# Patient Record
Sex: FEMALE | Race: WHITE | Employment: OTHER | URBAN - METROPOLITAN AREA
[De-identification: names, ages, dates, MRNs, and addresses within clinical notes are randomized per-mention and may not be internally consistent; named-entity substitution may affect disease eponyms.]

---

## 2017-02-14 ENCOUNTER — HOSPITAL ENCOUNTER (OUTPATIENT)
Age: 32
Discharge: HOME | End: 2017-02-14
Payer: COMMERCIAL

## 2017-02-14 ENCOUNTER — HOSPITAL ENCOUNTER (OUTPATIENT)
Dept: GENERAL RADIOLOGY | Age: 32
Discharge: HOME | End: 2017-02-14
Payer: COMMERCIAL

## 2017-02-14 ENCOUNTER — HOSPITAL ENCOUNTER (OUTPATIENT)
Age: 32
Setting detail: SPECIMEN
Discharge: HOME | End: 2017-02-14
Payer: COMMERCIAL

## 2017-02-14 DIAGNOSIS — M54.2 CERVICAL PAIN: ICD-10-CM

## 2017-02-14 DIAGNOSIS — M54.5 LOW BACK PAIN, UNSPECIFIED BACK PAIN LATERALITY, UNSPECIFIED CHRONICITY, WITH SCIATICA PRESENCE UNSPECIFIED: ICD-10-CM

## 2017-02-14 DIAGNOSIS — M54.6 THORACIC BACK PAIN, UNSPECIFIED BACK PAIN LATERALITY, UNSPECIFIED CHRONICITY: ICD-10-CM

## 2017-02-14 LAB
ALBUMIN SERPL-MCNC: 4 G/DL (ref 3.5–5.2)
ALBUMIN/GLOBULIN RATIO: 1.2 (ref 1–2.5)
ALP BLD-CCNC: 91 U/L (ref 35–104)
ALT SERPL-CCNC: 247 U/L (ref 5–33)
ANION GAP SERPL CALCULATED.3IONS-SCNC: 12 MMOL/L (ref 9–17)
AST SERPL-CCNC: 151 U/L
BILIRUB SERPL-MCNC: 0.62 MG/DL (ref 0.3–1.2)
BILIRUBIN DIRECT: 0.18 MG/DL
BILIRUBIN, INDIRECT: 0.44 MG/DL (ref 0–1)
BUN BLDV-MCNC: 15 MG/DL (ref 6–20)
CHLORIDE BLD-SCNC: 103 MMOL/L (ref 98–107)
CHOLESTEROL/HDL RATIO: 6.2
CHOLESTEROL: 247 MG/DL
CO2: 26 MMOL/L (ref 20–31)
CREAT SERPL-MCNC: 0.63 MG/DL (ref 0.5–0.9)
GFR AFRICAN AMERICAN: >60 ML/MIN
GFR NON-AFRICAN AMERICAN: >60 ML/MIN
GFR SERPL CREATININE-BSD FRML MDRD: NORMAL ML/MIN/{1.73_M2}
GFR SERPL CREATININE-BSD FRML MDRD: NORMAL ML/MIN/{1.73_M2}
GLOBULIN: ABNORMAL G/DL (ref 1.5–3.8)
GLUCOSE BLD-MCNC: 86 MG/DL (ref 70–99)
HCT VFR BLD CALC: 43 % (ref 36–46)
HDLC SERPL-MCNC: 40 MG/DL
HEMOGLOBIN: 14.5 G/DL (ref 12–16)
LDL CHOLESTEROL: 128 MG/DL (ref 0–130)
MCH RBC QN AUTO: 31.4 PG (ref 26–34)
MCHC RBC AUTO-ENTMCNC: 33.8 G/DL (ref 31–37)
MCV RBC AUTO: 92.8 FL (ref 80–100)
PDW BLD-RTO: 14.1 % (ref 12.5–15.4)
PLATELET # BLD: 237 K/UL (ref 140–450)
PMV BLD AUTO: 8.7 FL (ref 6–12)
POTASSIUM SERPL-SCNC: 4.5 MMOL/L (ref 3.7–5.3)
RBC # BLD: 4.63 M/UL (ref 4–5.2)
SEDIMENTATION RATE, ERYTHROCYTE: 18 MM (ref 0–20)
SODIUM BLD-SCNC: 141 MMOL/L (ref 135–144)
TOTAL PROTEIN: 7.4 G/DL (ref 6.4–8.3)
TRIGL SERPL-MCNC: 397 MG/DL
TSH SERPL DL<=0.05 MIU/L-ACNC: 0.93 MIU/L (ref 0.3–5)
VITAMIN B-12: 341 PG/ML (ref 211–946)
VITAMIN D 25-HYDROXY: 13.4 NG/ML (ref 30–100)
VLDLC SERPL CALC-MCNC: ABNORMAL MG/DL (ref 1–30)
WBC # BLD: 7.9 K/UL (ref 3.5–11)

## 2017-02-14 PROCEDURE — 85027 COMPLETE CBC AUTOMATED: CPT

## 2017-02-14 PROCEDURE — 82565 ASSAY OF CREATININE: CPT

## 2017-02-14 PROCEDURE — 80051 ELECTROLYTE PANEL: CPT

## 2017-02-14 PROCEDURE — 80076 HEPATIC FUNCTION PANEL: CPT

## 2017-02-14 PROCEDURE — 82947 ASSAY GLUCOSE BLOOD QUANT: CPT

## 2017-02-14 PROCEDURE — 85651 RBC SED RATE NONAUTOMATED: CPT

## 2017-02-14 PROCEDURE — 72100 X-RAY EXAM L-S SPINE 2/3 VWS: CPT

## 2017-02-14 PROCEDURE — 72072 X-RAY EXAM THORAC SPINE 3VWS: CPT

## 2017-02-14 PROCEDURE — 82607 VITAMIN B-12: CPT

## 2017-02-14 PROCEDURE — 84443 ASSAY THYROID STIM HORMONE: CPT

## 2017-02-14 PROCEDURE — 84520 ASSAY OF UREA NITROGEN: CPT

## 2017-02-14 PROCEDURE — 36415 COLL VENOUS BLD VENIPUNCTURE: CPT

## 2017-02-14 PROCEDURE — 80061 LIPID PANEL: CPT

## 2017-02-14 PROCEDURE — 72040 X-RAY EXAM NECK SPINE 2-3 VW: CPT

## 2017-02-14 PROCEDURE — 82306 VITAMIN D 25 HYDROXY: CPT

## 2017-07-11 ENCOUNTER — APPOINTMENT (OUTPATIENT)
Dept: ULTRASOUND IMAGING | Age: 32
End: 2017-07-11
Payer: COMMERCIAL

## 2017-07-11 ENCOUNTER — HOSPITAL ENCOUNTER (EMERGENCY)
Age: 32
Discharge: HOME | End: 2017-07-11
Attending: EMERGENCY MEDICINE
Payer: COMMERCIAL

## 2017-07-11 VITALS
OXYGEN SATURATION: 98 % | HEIGHT: 64 IN | SYSTOLIC BLOOD PRESSURE: 151 MMHG | BODY MASS INDEX: 35.85 KG/M2 | RESPIRATION RATE: 16 BRPM | DIASTOLIC BLOOD PRESSURE: 92 MMHG | WEIGHT: 210 LBS | TEMPERATURE: 97.9 F | HEART RATE: 81 BPM

## 2017-07-11 DIAGNOSIS — N30.00 ACUTE CYSTITIS WITHOUT HEMATURIA: ICD-10-CM

## 2017-07-11 DIAGNOSIS — M54.41 ACUTE RIGHT-SIDED LOW BACK PAIN WITH RIGHT-SIDED SCIATICA: Primary | ICD-10-CM

## 2017-07-11 LAB
-: ABNORMAL
ABSOLUTE EOS #: 0.2 K/UL (ref 0–0.4)
ABSOLUTE LYMPH #: 2.8 K/UL (ref 1–4.8)
ABSOLUTE MONO #: 0.5 K/UL (ref 0.1–1.2)
ALBUMIN SERPL-MCNC: 4.2 G/DL (ref 3.5–5.2)
ALBUMIN/GLOBULIN RATIO: 1.2 (ref 1–2.5)
ALP BLD-CCNC: 82 U/L (ref 35–104)
ALT SERPL-CCNC: 227 U/L (ref 5–33)
AMORPHOUS: ABNORMAL
ANION GAP SERPL CALCULATED.3IONS-SCNC: 15 MMOL/L (ref 9–17)
AST SERPL-CCNC: 156 U/L
BACTERIA: ABNORMAL
BASOPHILS # BLD: 1 %
BASOPHILS ABSOLUTE: 0.1 K/UL (ref 0–0.2)
BILIRUB SERPL-MCNC: 0.47 MG/DL (ref 0.3–1.2)
BILIRUBIN URINE: NEGATIVE
BUN BLDV-MCNC: 9 MG/DL (ref 6–20)
BUN/CREAT BLD: ABNORMAL (ref 9–20)
CALCIUM SERPL-MCNC: 9.4 MG/DL (ref 8.6–10.4)
CASTS UA: ABNORMAL /LPF (ref 0–2)
CHLORIDE BLD-SCNC: 103 MMOL/L (ref 98–107)
CO2: 22 MMOL/L (ref 20–31)
COLOR: YELLOW
CREAT SERPL-MCNC: 0.59 MG/DL (ref 0.5–0.9)
CRYSTALS, UA: ABNORMAL /HPF
DIFFERENTIAL TYPE: NORMAL
DIRECT EXAM: ABNORMAL
EOSINOPHILS RELATIVE PERCENT: 2 %
EPITHELIAL CELLS UA: ABNORMAL /HPF (ref 0–5)
GFR AFRICAN AMERICAN: >60 ML/MIN
GFR NON-AFRICAN AMERICAN: >60 ML/MIN
GFR SERPL CREATININE-BSD FRML MDRD: ABNORMAL ML/MIN/{1.73_M2}
GFR SERPL CREATININE-BSD FRML MDRD: ABNORMAL ML/MIN/{1.73_M2}
GLUCOSE BLD-MCNC: 83 MG/DL (ref 70–99)
GLUCOSE URINE: NEGATIVE
HCG QUALITATIVE: NEGATIVE
HCT VFR BLD CALC: 45.5 % (ref 36–46)
HEMOGLOBIN: 15.5 G/DL (ref 12–16)
KETONES, URINE: NEGATIVE
LEUKOCYTE ESTERASE, URINE: ABNORMAL
LIPASE: 31 U/L (ref 13–60)
LYMPHOCYTES # BLD: 32 %
Lab: ABNORMAL
MCH RBC QN AUTO: 31.9 PG (ref 26–34)
MCHC RBC AUTO-ENTMCNC: 34 G/DL (ref 31–37)
MCV RBC AUTO: 93.9 FL (ref 80–100)
MONOCYTES # BLD: 6 %
MUCUS: ABNORMAL
NITRITE, URINE: NEGATIVE
OTHER OBSERVATIONS UA: ABNORMAL
PDW BLD-RTO: 14.1 % (ref 12.5–15.4)
PH UA: 5.5 (ref 5–8)
PLATELET # BLD: 239 K/UL (ref 140–450)
PLATELET ESTIMATE: NORMAL
PMV BLD AUTO: 9.5 FL (ref 6–12)
POTASSIUM SERPL-SCNC: 4.1 MMOL/L (ref 3.7–5.3)
PROTEIN UA: NEGATIVE
RBC # BLD: 4.85 M/UL (ref 4–5.2)
RBC # BLD: NORMAL 10*6/UL
RBC UA: ABNORMAL /HPF (ref 0–2)
RENAL EPITHELIAL, UA: ABNORMAL /HPF
SEG NEUTROPHILS: 59 %
SEGMENTED NEUTROPHILS ABSOLUTE COUNT: 5.2 K/UL (ref 1.8–7.7)
SODIUM BLD-SCNC: 140 MMOL/L (ref 135–144)
SPECIFIC GRAVITY UA: 1.03 (ref 1–1.03)
SPECIMEN DESCRIPTION: ABNORMAL
STATUS: ABNORMAL
TOTAL PROTEIN: 7.7 G/DL (ref 6.4–8.3)
TRICHOMONAS: ABNORMAL
TURBIDITY: ABNORMAL
URINE HGB: NEGATIVE
UROBILINOGEN, URINE: NORMAL
WBC # BLD: 8.9 K/UL (ref 3.5–11)
WBC # BLD: NORMAL 10*3/UL
WBC UA: ABNORMAL /HPF (ref 0–5)
YEAST: ABNORMAL

## 2017-07-11 PROCEDURE — 83690 ASSAY OF LIPASE: CPT

## 2017-07-11 PROCEDURE — 85025 COMPLETE CBC W/AUTO DIFF WBC: CPT

## 2017-07-11 PROCEDURE — 99284 EMERGENCY DEPT VISIT MOD MDM: CPT

## 2017-07-11 PROCEDURE — 87480 CANDIDA DNA DIR PROBE: CPT

## 2017-07-11 PROCEDURE — 84703 CHORIONIC GONADOTROPIN ASSAY: CPT

## 2017-07-11 PROCEDURE — 2500000003 HC RX 250 WO HCPCS

## 2017-07-11 PROCEDURE — 87510 GARDNER VAG DNA DIR PROBE: CPT

## 2017-07-11 PROCEDURE — 96375 TX/PRO/DX INJ NEW DRUG ADDON: CPT

## 2017-07-11 PROCEDURE — 80053 COMPREHEN METABOLIC PANEL: CPT

## 2017-07-11 PROCEDURE — 87491 CHLMYD TRACH DNA AMP PROBE: CPT

## 2017-07-11 PROCEDURE — 76830 TRANSVAGINAL US NON-OB: CPT

## 2017-07-11 PROCEDURE — 96374 THER/PROPH/DIAG INJ IV PUSH: CPT

## 2017-07-11 PROCEDURE — 2580000003 HC RX 258: Performed by: EMERGENCY MEDICINE

## 2017-07-11 PROCEDURE — 6370000000 HC RX 637 (ALT 250 FOR IP): Performed by: EMERGENCY MEDICINE

## 2017-07-11 PROCEDURE — 76856 US EXAM PELVIC COMPLETE: CPT

## 2017-07-11 PROCEDURE — 6360000002 HC RX W HCPCS: Performed by: EMERGENCY MEDICINE

## 2017-07-11 PROCEDURE — 87660 TRICHOMONAS VAGIN DIR PROBE: CPT

## 2017-07-11 PROCEDURE — 93976 VASCULAR STUDY: CPT

## 2017-07-11 PROCEDURE — 96372 THER/PROPH/DIAG INJ SC/IM: CPT

## 2017-07-11 PROCEDURE — 87591 N.GONORRHOEAE DNA AMP PROB: CPT

## 2017-07-11 PROCEDURE — 81001 URINALYSIS AUTO W/SCOPE: CPT

## 2017-07-11 RX ORDER — METRONIDAZOLE 500 MG/1
500 TABLET ORAL 2 TIMES DAILY
Qty: 14 TABLET | Refills: 0 | Status: SHIPPED | OUTPATIENT
Start: 2017-07-11 | End: 2017-07-18

## 2017-07-11 RX ORDER — CEPHALEXIN 500 MG/1
500 CAPSULE ORAL ONCE
Status: COMPLETED | OUTPATIENT
Start: 2017-07-11 | End: 2017-07-11

## 2017-07-11 RX ORDER — CEPHALEXIN 500 MG/1
500 CAPSULE ORAL 4 TIMES DAILY
Qty: 28 CAPSULE | Refills: 0 | Status: SHIPPED | OUTPATIENT
Start: 2017-07-11 | End: 2017-07-18

## 2017-07-11 RX ORDER — DIAZEPAM 2 MG/1
2 TABLET ORAL EVERY 8 HOURS PRN
Qty: 9 TABLET | Refills: 0 | Status: SHIPPED | OUTPATIENT
Start: 2017-07-11 | End: 2017-07-21

## 2017-07-11 RX ORDER — CEPHALEXIN 500 MG/1
500 CAPSULE ORAL 4 TIMES DAILY
Qty: 28 CAPSULE | Refills: 0 | Status: SHIPPED | OUTPATIENT
Start: 2017-07-11 | End: 2017-07-11

## 2017-07-11 RX ORDER — ONDANSETRON 2 MG/ML
4 INJECTION INTRAMUSCULAR; INTRAVENOUS ONCE
Status: COMPLETED | OUTPATIENT
Start: 2017-07-11 | End: 2017-07-11

## 2017-07-11 RX ORDER — AZITHROMYCIN 250 MG/1
1000 TABLET, FILM COATED ORAL ONCE
Status: COMPLETED | OUTPATIENT
Start: 2017-07-11 | End: 2017-07-11

## 2017-07-11 RX ORDER — ONDANSETRON 4 MG/1
4 TABLET, ORALLY DISINTEGRATING ORAL EVERY 8 HOURS PRN
Qty: 8 TABLET | Refills: 0 | Status: SHIPPED | OUTPATIENT
Start: 2017-07-11 | End: 2017-07-11

## 2017-07-11 RX ORDER — ONDANSETRON 4 MG/1
4 TABLET, ORALLY DISINTEGRATING ORAL EVERY 8 HOURS PRN
Qty: 8 TABLET | Refills: 0 | Status: SHIPPED | OUTPATIENT
Start: 2017-07-11 | End: 2017-08-01

## 2017-07-11 RX ORDER — LIDOCAINE HYDROCHLORIDE 20 MG/ML
INJECTION, SOLUTION INFILTRATION; PERINEURAL
Status: COMPLETED
Start: 2017-07-11 | End: 2017-07-11

## 2017-07-11 RX ORDER — FENTANYL CITRATE 50 UG/ML
50 INJECTION, SOLUTION INTRAMUSCULAR; INTRAVENOUS ONCE
Status: COMPLETED | OUTPATIENT
Start: 2017-07-11 | End: 2017-07-11

## 2017-07-11 RX ORDER — DIAZEPAM 2 MG/1
2 TABLET ORAL EVERY 8 HOURS PRN
Qty: 9 TABLET | Refills: 0 | Status: SHIPPED | OUTPATIENT
Start: 2017-07-11 | End: 2017-07-11

## 2017-07-11 RX ORDER — CEFTRIAXONE SODIUM 250 MG/1
250 INJECTION, POWDER, FOR SOLUTION INTRAMUSCULAR; INTRAVENOUS ONCE
Status: COMPLETED | OUTPATIENT
Start: 2017-07-11 | End: 2017-07-11

## 2017-07-11 RX ORDER — 0.9 % SODIUM CHLORIDE 0.9 %
1000 INTRAVENOUS SOLUTION INTRAVENOUS ONCE
Status: COMPLETED | OUTPATIENT
Start: 2017-07-11 | End: 2017-07-11

## 2017-07-11 RX ADMIN — CEPHALEXIN 500 MG: 500 CAPSULE ORAL at 11:38

## 2017-07-11 RX ADMIN — LIDOCAINE HYDROCHLORIDE 400 MG: 20 INJECTION, SOLUTION INFILTRATION; PERINEURAL at 13:57

## 2017-07-11 RX ADMIN — FENTANYL CITRATE 50 MCG: 50 INJECTION, SOLUTION INTRAMUSCULAR; INTRAVENOUS at 10:51

## 2017-07-11 RX ADMIN — AZITHROMYCIN 1000 MG: 250 TABLET, FILM COATED ORAL at 13:57

## 2017-07-11 RX ADMIN — CEFTRIAXONE SODIUM 250 MG: 250 INJECTION, POWDER, FOR SOLUTION INTRAMUSCULAR; INTRAVENOUS at 13:57

## 2017-07-11 RX ADMIN — ONDANSETRON 4 MG: 2 INJECTION, SOLUTION INTRAMUSCULAR; INTRAVENOUS at 10:51

## 2017-07-11 RX ADMIN — SODIUM CHLORIDE 1000 ML: 9 INJECTION, SOLUTION INTRAVENOUS at 10:51

## 2017-07-11 ASSESSMENT — PAIN DESCRIPTION - PAIN TYPE: TYPE: ACUTE PAIN

## 2017-07-11 ASSESSMENT — PAIN SCALES - GENERAL
PAINLEVEL_OUTOF10: 8

## 2017-07-11 ASSESSMENT — ENCOUNTER SYMPTOMS
ABDOMINAL PAIN: 1
DIARRHEA: 0
NAUSEA: 1
SORE THROAT: 0
SHORTNESS OF BREATH: 0
VOMITING: 0
CONSTIPATION: 0
CHEST TIGHTNESS: 0
BACK PAIN: 1

## 2017-07-11 ASSESSMENT — PAIN DESCRIPTION - ORIENTATION: ORIENTATION: LOWER

## 2017-07-11 ASSESSMENT — PAIN DESCRIPTION - PROGRESSION: CLINICAL_PROGRESSION: NOT CHANGED

## 2017-07-11 ASSESSMENT — PAIN DESCRIPTION - LOCATION: LOCATION: ABDOMEN;BACK

## 2017-07-11 ASSESSMENT — PAIN DESCRIPTION - FREQUENCY: FREQUENCY: CONTINUOUS

## 2017-07-11 ASSESSMENT — PAIN DESCRIPTION - DESCRIPTORS: DESCRIPTORS: CONSTANT

## 2017-07-12 LAB
C TRACH DNA GENITAL QL NAA+PROBE: NEGATIVE
N. GONORRHOEAE DNA: NEGATIVE

## 2017-08-01 ENCOUNTER — HOSPITAL ENCOUNTER (EMERGENCY)
Age: 32
Discharge: HOME | End: 2017-08-01
Attending: EMERGENCY MEDICINE
Payer: COMMERCIAL

## 2017-08-01 VITALS
HEART RATE: 82 BPM | DIASTOLIC BLOOD PRESSURE: 96 MMHG | BODY MASS INDEX: 37.73 KG/M2 | WEIGHT: 221 LBS | SYSTOLIC BLOOD PRESSURE: 139 MMHG | RESPIRATION RATE: 16 BRPM | TEMPERATURE: 97 F | HEIGHT: 64 IN | OXYGEN SATURATION: 97 %

## 2017-08-01 DIAGNOSIS — K52.9 GASTROENTERITIS: Primary | ICD-10-CM

## 2017-08-01 DIAGNOSIS — R79.89 ELEVATED LFTS: ICD-10-CM

## 2017-08-01 LAB
ABSOLUTE EOS #: 0.1 K/UL (ref 0–0.4)
ABSOLUTE LYMPH #: 1.7 K/UL (ref 1–4.8)
ABSOLUTE MONO #: 0.2 K/UL (ref 0.1–1.2)
ALBUMIN SERPL-MCNC: 4.2 G/DL (ref 3.5–5.2)
ALBUMIN/GLOBULIN RATIO: 1.3 (ref 1–2.5)
ALP BLD-CCNC: 85 U/L (ref 35–104)
ALT SERPL-CCNC: 206 U/L (ref 5–33)
ANION GAP SERPL CALCULATED.3IONS-SCNC: 14 MMOL/L (ref 9–17)
AST SERPL-CCNC: 137 U/L
BASOPHILS # BLD: 0 %
BASOPHILS ABSOLUTE: 0 K/UL (ref 0–0.2)
BILIRUB SERPL-MCNC: 0.77 MG/DL (ref 0.3–1.2)
BUN BLDV-MCNC: 10 MG/DL (ref 6–20)
BUN/CREAT BLD: ABNORMAL (ref 9–20)
CALCIUM SERPL-MCNC: 9.2 MG/DL (ref 8.6–10.4)
CHLORIDE BLD-SCNC: 104 MMOL/L (ref 98–107)
CO2: 22 MMOL/L (ref 20–31)
CREAT SERPL-MCNC: 0.62 MG/DL (ref 0.5–0.9)
DIFFERENTIAL TYPE: NORMAL
EOSINOPHILS RELATIVE PERCENT: 1 %
GFR AFRICAN AMERICAN: >60 ML/MIN
GFR NON-AFRICAN AMERICAN: >60 ML/MIN
GFR SERPL CREATININE-BSD FRML MDRD: ABNORMAL ML/MIN/{1.73_M2}
GFR SERPL CREATININE-BSD FRML MDRD: ABNORMAL ML/MIN/{1.73_M2}
GLUCOSE BLD-MCNC: 91 MG/DL (ref 70–99)
HCG QUALITATIVE: NEGATIVE
HCT VFR BLD CALC: 43.7 % (ref 36–46)
HEMOGLOBIN: 15.1 G/DL (ref 12–16)
LIPASE: 22 U/L (ref 13–60)
LYMPHOCYTES # BLD: 18 %
MCH RBC QN AUTO: 32.7 PG (ref 26–34)
MCHC RBC AUTO-ENTMCNC: 34.7 G/DL (ref 31–37)
MCV RBC AUTO: 94.2 FL (ref 80–100)
MONOCYTES # BLD: 2 %
PDW BLD-RTO: 13.5 % (ref 12.5–15.4)
PLATELET # BLD: 267 K/UL (ref 140–450)
PLATELET ESTIMATE: NORMAL
PMV BLD AUTO: 8.7 FL (ref 6–12)
POTASSIUM SERPL-SCNC: 3.9 MMOL/L (ref 3.7–5.3)
RBC # BLD: 4.64 M/UL (ref 4–5.2)
RBC # BLD: NORMAL 10*6/UL
SEG NEUTROPHILS: 79 %
SEGMENTED NEUTROPHILS ABSOLUTE COUNT: 7.3 K/UL (ref 1.8–7.7)
SODIUM BLD-SCNC: 140 MMOL/L (ref 135–144)
TOTAL PROTEIN: 7.4 G/DL (ref 6.4–8.3)
WBC # BLD: 9.3 K/UL (ref 3.5–11)
WBC # BLD: NORMAL 10*3/UL

## 2017-08-01 PROCEDURE — 80053 COMPREHEN METABOLIC PANEL: CPT

## 2017-08-01 PROCEDURE — 83690 ASSAY OF LIPASE: CPT

## 2017-08-01 PROCEDURE — 6360000002 HC RX W HCPCS: Performed by: EMERGENCY MEDICINE

## 2017-08-01 PROCEDURE — 6370000000 HC RX 637 (ALT 250 FOR IP): Performed by: EMERGENCY MEDICINE

## 2017-08-01 PROCEDURE — 84703 CHORIONIC GONADOTROPIN ASSAY: CPT

## 2017-08-01 PROCEDURE — 99283 EMERGENCY DEPT VISIT LOW MDM: CPT

## 2017-08-01 PROCEDURE — S0028 INJECTION, FAMOTIDINE, 20 MG: HCPCS | Performed by: EMERGENCY MEDICINE

## 2017-08-01 PROCEDURE — 85025 COMPLETE CBC W/AUTO DIFF WBC: CPT

## 2017-08-01 PROCEDURE — 96375 TX/PRO/DX INJ NEW DRUG ADDON: CPT

## 2017-08-01 PROCEDURE — 96374 THER/PROPH/DIAG INJ IV PUSH: CPT

## 2017-08-01 PROCEDURE — 2500000003 HC RX 250 WO HCPCS: Performed by: EMERGENCY MEDICINE

## 2017-08-01 PROCEDURE — 2580000003 HC RX 258: Performed by: EMERGENCY MEDICINE

## 2017-08-01 RX ORDER — ONDANSETRON 2 MG/ML
4 INJECTION INTRAMUSCULAR; INTRAVENOUS ONCE
Status: COMPLETED | OUTPATIENT
Start: 2017-08-01 | End: 2017-08-01

## 2017-08-01 RX ORDER — ALUMINA, MAGNESIA, AND SIMETHICONE 2400; 2400; 240 MG/30ML; MG/30ML; MG/30ML
30 SUSPENSION ORAL EVERY 6 HOURS PRN
Qty: 769 ML | Refills: 0 | Status: SHIPPED | OUTPATIENT
Start: 2017-08-01 | End: 2020-02-24

## 2017-08-01 RX ORDER — ONDANSETRON 4 MG/1
4 TABLET, ORALLY DISINTEGRATING ORAL EVERY 6 HOURS PRN
Qty: 8 TABLET | Refills: 0 | Status: SHIPPED | OUTPATIENT
Start: 2017-08-01 | End: 2020-02-24

## 2017-08-01 RX ORDER — 0.9 % SODIUM CHLORIDE 0.9 %
1000 INTRAVENOUS SOLUTION INTRAVENOUS ONCE
Status: COMPLETED | OUTPATIENT
Start: 2017-08-01 | End: 2017-08-01

## 2017-08-01 RX ORDER — FAMOTIDINE 20 MG/1
20 TABLET, FILM COATED ORAL 2 TIMES DAILY
Qty: 30 TABLET | Refills: 0 | Status: SHIPPED | OUTPATIENT
Start: 2017-08-01 | End: 2020-02-24

## 2017-08-01 RX ORDER — MAGNESIUM HYDROXIDE/ALUMINUM HYDROXICE/SIMETHICONE 120; 1200; 1200 MG/30ML; MG/30ML; MG/30ML
30 SUSPENSION ORAL ONCE
Status: COMPLETED | OUTPATIENT
Start: 2017-08-01 | End: 2017-08-01

## 2017-08-01 RX ADMIN — SODIUM CHLORIDE 1000 ML: 9 INJECTION, SOLUTION INTRAVENOUS at 10:08

## 2017-08-01 RX ADMIN — ALUMINUM HYDROXIDE, MAGNESIUM HYDROXIDE, AND SIMETHICONE 30 ML: 200; 200; 20 SUSPENSION ORAL at 11:09

## 2017-08-01 RX ADMIN — ONDANSETRON 4 MG: 2 INJECTION INTRAMUSCULAR; INTRAVENOUS at 10:09

## 2017-08-01 RX ADMIN — FAMOTIDINE 20 MG: 10 INJECTION, SOLUTION INTRAVENOUS at 10:09

## 2017-08-01 ASSESSMENT — ENCOUNTER SYMPTOMS
VOMITING: 1
NAUSEA: 1
DIARRHEA: 1
WHEEZING: 0
ABDOMINAL PAIN: 1
SHORTNESS OF BREATH: 0

## 2017-08-01 ASSESSMENT — PAIN DESCRIPTION - PAIN TYPE: TYPE: CHRONIC PAIN

## 2017-08-01 ASSESSMENT — PAIN SCALES - GENERAL: PAINLEVEL_OUTOF10: 7

## 2017-08-01 ASSESSMENT — PAIN DESCRIPTION - LOCATION: LOCATION: ABDOMEN

## 2017-08-01 ASSESSMENT — PAIN DESCRIPTION - DESCRIPTORS: DESCRIPTORS: SHOOTING;SHARP

## 2018-03-22 ENCOUNTER — HOSPITAL ENCOUNTER (EMERGENCY)
Age: 33
Discharge: HOME | End: 2018-03-22
Attending: EMERGENCY MEDICINE
Payer: COMMERCIAL

## 2018-03-22 VITALS
SYSTOLIC BLOOD PRESSURE: 155 MMHG | RESPIRATION RATE: 18 BRPM | TEMPERATURE: 96.8 F | OXYGEN SATURATION: 97 % | HEART RATE: 82 BPM | DIASTOLIC BLOOD PRESSURE: 90 MMHG

## 2018-03-22 DIAGNOSIS — N30.00 ACUTE CYSTITIS WITHOUT HEMATURIA: Primary | ICD-10-CM

## 2018-03-22 DIAGNOSIS — R10.13 EPIGASTRIC PAIN: ICD-10-CM

## 2018-03-22 LAB
-: ABNORMAL
ABSOLUTE EOS #: 0.18 K/UL (ref 0–0.44)
ABSOLUTE IMMATURE GRANULOCYTE: 0.03 K/UL (ref 0–0.3)
ABSOLUTE LYMPH #: 2.95 K/UL (ref 1.1–3.7)
ABSOLUTE MONO #: 0.77 K/UL (ref 0.1–1.2)
ALBUMIN SERPL-MCNC: 3.8 G/DL (ref 3.5–5.2)
ALBUMIN/GLOBULIN RATIO: 1.2 (ref 1–2.5)
ALP BLD-CCNC: 61 U/L (ref 35–104)
ALT SERPL-CCNC: 63 U/L (ref 5–33)
AMORPHOUS: ABNORMAL
ANION GAP SERPL CALCULATED.3IONS-SCNC: 11 MMOL/L (ref 9–17)
AST SERPL-CCNC: 43 U/L
BACTERIA: ABNORMAL
BASOPHILS # BLD: 1 % (ref 0–2)
BASOPHILS ABSOLUTE: 0.07 K/UL (ref 0–0.2)
BILIRUB SERPL-MCNC: 0.3 MG/DL (ref 0.3–1.2)
BILIRUBIN URINE: NEGATIVE
BUN BLDV-MCNC: 15 MG/DL (ref 6–20)
BUN/CREAT BLD: ABNORMAL (ref 9–20)
CALCIUM SERPL-MCNC: 8.9 MG/DL (ref 8.6–10.4)
CASTS UA: ABNORMAL /LPF (ref 0–8)
CHLORIDE BLD-SCNC: 102 MMOL/L (ref 98–107)
CO2: 26 MMOL/L (ref 20–31)
COLOR: YELLOW
COMMENT UA: ABNORMAL
CREAT SERPL-MCNC: 0.62 MG/DL (ref 0.5–0.9)
CRYSTALS, UA: ABNORMAL /HPF
DIFFERENTIAL TYPE: ABNORMAL
EOSINOPHILS RELATIVE PERCENT: 2 % (ref 1–4)
EPITHELIAL CELLS UA: ABNORMAL /HPF (ref 0–5)
GFR AFRICAN AMERICAN: >60 ML/MIN
GFR NON-AFRICAN AMERICAN: >60 ML/MIN
GFR SERPL CREATININE-BSD FRML MDRD: ABNORMAL ML/MIN/{1.73_M2}
GFR SERPL CREATININE-BSD FRML MDRD: ABNORMAL ML/MIN/{1.73_M2}
GLUCOSE BLD-MCNC: 90 MG/DL (ref 70–99)
GLUCOSE URINE: NEGATIVE
HCG QUALITATIVE: NEGATIVE
HCT VFR BLD CALC: 43.7 % (ref 36.3–47.1)
HEMOGLOBIN: 14.6 G/DL (ref 11.9–15.1)
IMMATURE GRANULOCYTES: 0 %
KETONES, URINE: NEGATIVE
LEUKOCYTE ESTERASE, URINE: NEGATIVE
LIPASE: 41 U/L (ref 13–60)
LYMPHOCYTES # BLD: 26 % (ref 24–43)
MCH RBC QN AUTO: 32.4 PG (ref 25.2–33.5)
MCHC RBC AUTO-ENTMCNC: 33.4 G/DL (ref 28.4–34.8)
MCV RBC AUTO: 96.9 FL (ref 82.6–102.9)
MONOCYTES # BLD: 7 % (ref 3–12)
MUCUS: ABNORMAL
NITRITE, URINE: NEGATIVE
NRBC AUTOMATED: 0 PER 100 WBC
OTHER OBSERVATIONS UA: ABNORMAL
PDW BLD-RTO: 12.9 % (ref 11.8–14.4)
PH UA: 5.5 (ref 5–8)
PLATELET # BLD: 251 K/UL (ref 138–453)
PLATELET ESTIMATE: ABNORMAL
PMV BLD AUTO: 10.7 FL (ref 8.1–13.5)
POTASSIUM SERPL-SCNC: 3.8 MMOL/L (ref 3.7–5.3)
PROTEIN UA: NEGATIVE
RBC # BLD: 4.51 M/UL (ref 3.95–5.11)
RBC # BLD: ABNORMAL 10*6/UL
RBC UA: ABNORMAL /HPF (ref 0–4)
RENAL EPITHELIAL, UA: ABNORMAL /HPF
SEG NEUTROPHILS: 64 % (ref 36–65)
SEGMENTED NEUTROPHILS ABSOLUTE COUNT: 7.48 K/UL (ref 1.5–8.1)
SODIUM BLD-SCNC: 139 MMOL/L (ref 135–144)
SPECIFIC GRAVITY UA: 1.03 (ref 1–1.03)
TOTAL PROTEIN: 6.9 G/DL (ref 6.4–8.3)
TRICHOMONAS: ABNORMAL
TURBIDITY: ABNORMAL
URINE HGB: NEGATIVE
UROBILINOGEN, URINE: NORMAL
WBC # BLD: 11.5 K/UL (ref 3.5–11.3)
WBC # BLD: ABNORMAL 10*3/UL
WBC UA: ABNORMAL /HPF (ref 0–5)
YEAST: ABNORMAL

## 2018-03-22 PROCEDURE — 96374 THER/PROPH/DIAG INJ IV PUSH: CPT

## 2018-03-22 PROCEDURE — 99283 EMERGENCY DEPT VISIT LOW MDM: CPT

## 2018-03-22 PROCEDURE — 81001 URINALYSIS AUTO W/SCOPE: CPT

## 2018-03-22 PROCEDURE — 2580000003 HC RX 258: Performed by: EMERGENCY MEDICINE

## 2018-03-22 PROCEDURE — 84703 CHORIONIC GONADOTROPIN ASSAY: CPT

## 2018-03-22 PROCEDURE — 85025 COMPLETE CBC W/AUTO DIFF WBC: CPT

## 2018-03-22 PROCEDURE — 2500000003 HC RX 250 WO HCPCS: Performed by: EMERGENCY MEDICINE

## 2018-03-22 PROCEDURE — 83690 ASSAY OF LIPASE: CPT

## 2018-03-22 PROCEDURE — S0028 INJECTION, FAMOTIDINE, 20 MG: HCPCS | Performed by: EMERGENCY MEDICINE

## 2018-03-22 PROCEDURE — 80053 COMPREHEN METABOLIC PANEL: CPT

## 2018-03-22 PROCEDURE — 6370000000 HC RX 637 (ALT 250 FOR IP): Performed by: EMERGENCY MEDICINE

## 2018-03-22 RX ORDER — FAMOTIDINE 20 MG/1
20 TABLET, FILM COATED ORAL 2 TIMES DAILY
Qty: 60 TABLET | Refills: 0 | Status: SHIPPED | OUTPATIENT
Start: 2018-03-22 | End: 2020-02-24

## 2018-03-22 RX ORDER — MAGNESIUM HYDROXIDE/ALUMINUM HYDROXICE/SIMETHICONE 120; 1200; 1200 MG/30ML; MG/30ML; MG/30ML
30 SUSPENSION ORAL ONCE
Status: COMPLETED | OUTPATIENT
Start: 2018-03-22 | End: 2018-03-22

## 2018-03-22 RX ORDER — 0.9 % SODIUM CHLORIDE 0.9 %
1000 INTRAVENOUS SOLUTION INTRAVENOUS ONCE
Status: COMPLETED | OUTPATIENT
Start: 2018-03-22 | End: 2018-03-22

## 2018-03-22 RX ORDER — CEPHALEXIN 500 MG/1
500 CAPSULE ORAL 3 TIMES DAILY
Qty: 30 CAPSULE | Refills: 0 | Status: SHIPPED | OUTPATIENT
Start: 2018-03-22 | End: 2018-04-01

## 2018-03-22 RX ADMIN — ALUMINUM HYDROXIDE, MAGNESIUM HYDROXIDE, AND SIMETHICONE 30 ML: 200; 200; 20 SUSPENSION ORAL at 02:25

## 2018-03-22 RX ADMIN — FAMOTIDINE 20 MG: 10 INJECTION INTRAVENOUS at 02:59

## 2018-03-22 RX ADMIN — LIDOCAINE HYDROCHLORIDE 15 ML: 20 SOLUTION ORAL; TOPICAL at 02:25

## 2018-03-22 RX ADMIN — SODIUM CHLORIDE 1000 ML: 9 INJECTION, SOLUTION INTRAVENOUS at 02:59

## 2018-03-22 ASSESSMENT — ENCOUNTER SYMPTOMS
ABDOMINAL PAIN: 1
COUGH: 0
SORE THROAT: 0
SHORTNESS OF BREATH: 0
WHEEZING: 0
DIARRHEA: 0
SINUS PRESSURE: 0
NAUSEA: 1
VOMITING: 0
STRIDOR: 0
SINUS PAIN: 0

## 2018-03-22 ASSESSMENT — PAIN DESCRIPTION - LOCATION: LOCATION: ABDOMEN

## 2018-03-22 ASSESSMENT — PAIN SCALES - GENERAL: PAINLEVEL_OUTOF10: 8

## 2018-03-22 ASSESSMENT — PAIN DESCRIPTION - ORIENTATION: ORIENTATION: RIGHT

## 2018-03-22 NOTE — ED PROVIDER NOTES
children: N/A    Years of education: N/A     Occupational History    Not on file. Social History Main Topics    Smoking status: Former Smoker     Quit date: 10/2/2010    Smokeless tobacco: Never Used    Alcohol use No    Drug use: No    Sexual activity: Not on file     Other Topics Concern    Not on file     Social History Narrative    No narrative on file       Family History   Problem Relation Age of Onset    Depression Mother     Hypertension Mother     Hypertension Maternal Grandmother     Cancer Maternal Grandfather     Hypertension Sister        Allergies:  Claritin [loratadine]; Guaifenesin er; Remicade [infliximab]; and Robitussin (alcohol free) [guaifenesin]    Home Medications:  Prior to Admission medications    Medication Sig Start Date End Date Taking? Authorizing Provider   famotidine (PEPCID) 20 MG tablet Take 1 tablet by mouth 2 times daily 3/22/18  Yes Val Alfred MD   cephALEXin [facility]) 500 MG capsule Take 1 capsule by mouth 3 times daily for 10 days 3/22/18 4/1/18 Yes Val Alfred MD   ondansetron (ZOFRAN ODT) 4 MG disintegrating tablet Take 1 tablet by mouth every 6 hours as needed for Nausea 8/1/17   Azeb Herbert DO   famotidine (PEPCID) 20 MG tablet Take 1 tablet by mouth 2 times daily 8/1/17   Azeb Hrebert DO   aluminum & magnesium hydroxide-simethicone (MAALOX ADVANCED MAX ST) 400-400-40 MG/5ML SUSP Take 30 mLs by mouth every 6 hours as needed (indigestion, stomach pain/discomfort) 8/1/17   Azeb Herbert DO   venlafaxine (EFFEXOR XR) 37.5 MG XR capsule 1 po qd x 1 week and then 2 po qd 6/3/16   Kristina Ornelas PA-C   omeprazole (PRILOSEC) 40 MG capsule Take 1 capsule by mouth daily 5/13/16   Kristina Ornelas PA-C       REVIEW OF SYSTEMS    (2-9 systems for level 4, 10 or more for level 5)      Review of Systems   Constitutional: Negative for activity change, appetite change, chills, diaphoresis, fatigue and fever.    HENT: Negative for congestion, sinus pain, sinus pressure, sneezing and sore throat. Respiratory: Negative for cough, shortness of breath, wheezing and stridor. Cardiovascular: Negative for chest pain. Gastrointestinal: Positive for abdominal pain and nausea. Negative for diarrhea and vomiting. Musculoskeletal: Negative for arthralgias and myalgias. Skin: Negative for rash and wound. Neurological: Negative for dizziness, weakness, light-headedness and numbness. Psychiatric/Behavioral: Negative for agitation, behavioral problems and confusion. PHYSICAL EXAM   (up to 7 for level 4, 8 or more for level 5)      INITIAL VITALS:   BP (!) 155/90   Pulse 82   Temp 96.8 °F (36 °C) (Oral)   Resp 18   LMP 03/02/2018   SpO2 97%     Physical Exam   Constitutional: She is oriented to person, place, and time. She appears well-developed and well-nourished. No distress. HENT:   Head: Normocephalic and atraumatic. Eyes: Conjunctivae and EOM are normal. Pupils are equal, round, and reactive to light. Neck: Normal range of motion. Neck supple. Cardiovascular: Normal rate, regular rhythm and normal heart sounds. No murmur heard. Pulmonary/Chest: Effort normal and breath sounds normal. No respiratory distress. She has no wheezes. Abdominal: Soft. She exhibits no distension. There is tenderness in the right upper quadrant and epigastric area. Large well-healed surgical scar present. Patient has right upper quadrant and epigastric abdominal tenderness palpation. There is no right lower quadrant and left lower quadrant or left upper quadrant abdominal tenderness palpation. No tenderness over McBurney's point. No guarding, rigidity or rebound tenderness. Patient's abdomen is otherwise soft. Neurological: She is alert and oriented to person, place, and time. Skin: She is not diaphoretic.        DIFFERENTIAL  DIAGNOSIS     PLAN (LABS / IMAGING / EKG):  Orders Placed This Encounter   Procedures    CBC WITH AUTO DIFFERENTIAL    COMPREHENSIVE mg/dL    Sodium 139 135 - 144 mmol/L    Potassium 3.8 3.7 - 5.3 mmol/L    Chloride 102 98 - 107 mmol/L    CO2 26 20 - 31 mmol/L    Anion Gap 11 9 - 17 mmol/L    Alkaline Phosphatase 61 35 - 104 U/L    ALT 63 (H) 5 - 33 U/L    AST 43 (H) <32 U/L    Total Bilirubin 0.30 0.3 - 1.2 mg/dL    Total Protein 6.9 6.4 - 8.3 g/dL    Alb 3.8 3.5 - 5.2 g/dL    Albumin/Globulin Ratio 1.2 1.0 - 2.5    GFR Non-African American >60 >60 mL/min    GFR African American >60 >60 mL/min    GFR Comment          GFR Staging NOT REPORTED    LIPASE   Result Value Ref Range    Lipase 41 13 - 60 U/L   URINALYSIS   Result Value Ref Range    Color, UA YELLOW YEL    Turbidity UA SLIGHTLY CLOUDY (A) CLEAR    Glucose, Ur NEGATIVE NEG    Bilirubin Urine NEGATIVE NEG    Ketones, Urine NEGATIVE NEG    Specific Gravity, UA 1.032 (H) 1.005 - 1.030    Urine Hgb NEGATIVE NEG    pH, UA 5.5 5.0 - 8.0    Protein, UA NEGATIVE NEG    Urobilinogen, Urine Normal NORM    Nitrite, Urine NEGATIVE NEG    Leukocyte Esterase, Urine NEGATIVE NEG    Urinalysis Comments NOT REPORTED    HCG Qualitative, Serum   Result Value Ref Range    hCG Qual NEGATIVE NEG   Microscopic Urinalysis   Result Value Ref Range    -          WBC, UA 10 TO 20 0 - 5 /HPF    RBC, UA 2 TO 5 0 - 4 /HPF    Casts UA 5 TO 10 HYALINE 0 - 8 /LPF    Crystals UA NOT REPORTED NONE /HPF    Epithelial Cells UA 5 TO 10 0 - 5 /HPF    Renal Epithelial, Urine NOT REPORTED 0 /HPF    Bacteria, UA FEW (A) NONE    Mucus, UA NOT REPORTED NONE    Trichomonas, UA NOT REPORTED NONE    Amorphous, UA NOT REPORTED NONE    Other Observations UA NOT REPORTED NREQ    Yeast, UA NOT REPORTED NONE       RADIOLOGY:  None    EKG  None    All EKG's are interpreted by the Emergency Department Physician who either signs or Co-signs this chart in the absence of a cardiologist.    EMERGENCY DEPARTMENT COURSE:    Patient assessed at bedside she is resting comfortably without any obvious distress.   Patient will be assessed with CBC, CMP,

## 2018-03-22 NOTE — ED NOTES
Pt to room 20 with c/o right sided abd pain that radiates to right back. Pt reports that the pain woke her up out of her sleep tonight. Pt reports history of chrons disease, states that the pain does not feel similar to this. Pt denies urinary complaints. Pt alert and oriented x4, talking in complete sentences, RR even and unlabored. Pt acting age appropriate. Will continue to monitor.        Sherrine Shone, RN  03/22/18 013

## 2018-03-22 NOTE — ED NOTES
Labeled blood specimen collected and sent to lab via tube system.        Shaina Soliz RN  03/22/18 [id number]

## 2018-03-22 NOTE — ED PROVIDER NOTES
[facility] ED     Emergency Department     Faculty Attestation        I performed a history and physical examination of the patient and discussed management with the resident. I reviewed the residents note and agree with the documented findings and plan of care. Any areas of disagreement are noted on the chart. I was personally present for the key portions of any procedures. I have documented in the chart those procedures where I was not present during the key portions. I have reviewed the emergency nurses triage note. I agree with the chief complaint, past medical history, past surgical history, allergies, medications, social and family history as documented unless otherwise noted below. For mid-level providers such as nurse practitioners as well as physicians assistants:    I have personally seen and evaluated the patient. I find the patient's history and physical exam are consistent with NP/PA documentation. I agree with the care provided, treatment rendered, disposition, & follow-up plan. Additional findings are as noted. Vital Signs: BP (!) 155/90   Pulse 82   Temp 96.8 °F (36 °C) (Oral)   Resp 18   LMP 03/02/2018   SpO2 97%   PCP:  JADIEL WATSON, CNP    Pertinent Comments:     Patient complains of intermittent right upper quadrant epigastric abdominal pain. Present for the past 3 days. She still has her gallbladder pages history of Crohn's and had a history of a hemicolectomy. She is in the process of getting Remicade infusions. Exam she is afebrile nontoxic. She sitting up in no acute distress. Abdomen soft, mildly tender in the epigastrium and right upper quadrant. There is no rebound or guarding. Critical Care  None         Note, if the patient's blood pressure was elevated, and they have no history of hypertension, they were informed of the following:   The patient may have Pre-hypertension/Hypertension: The patient has been informed that they may have pre-hypertension or Hypertension based on a blood pressure reading in the emergency department. I recommend that the patient call the primary care provider listed on their discharge instructions or a physician of their choice this week to arrange follow up for further evaluation of possible pre-hypertension or Hypertension. (Please note that portions of this note were completed with a voice recognition program.  Efforts were made to edit the dictations but occasionally words are mis-transcribed. )    Hermann Ryan MD  Attending Emergency Medicine Physician              Chino Hobbs MD  03/22/18 4242

## 2019-09-13 ENCOUNTER — APPOINTMENT (OUTPATIENT)
Dept: ULTRASOUND IMAGING | Age: 34
End: 2019-09-13
Payer: MEDICARE

## 2019-09-13 ENCOUNTER — HOSPITAL ENCOUNTER (EMERGENCY)
Age: 34
Discharge: HOME | End: 2019-09-13
Attending: EMERGENCY MEDICINE
Payer: MEDICARE

## 2019-09-13 VITALS
RESPIRATION RATE: 17 BRPM | BODY MASS INDEX: 33.8 KG/M2 | WEIGHT: 198 LBS | SYSTOLIC BLOOD PRESSURE: 169 MMHG | HEIGHT: 64 IN | DIASTOLIC BLOOD PRESSURE: 109 MMHG | TEMPERATURE: 98.1 F | OXYGEN SATURATION: 99 % | HEART RATE: 75 BPM

## 2019-09-13 DIAGNOSIS — R10.9 ABDOMINAL PAIN, UNSPECIFIED ABDOMINAL LOCATION: Primary | ICD-10-CM

## 2019-09-13 LAB
-: NORMAL
ABSOLUTE EOS #: 0.17 K/UL (ref 0–0.44)
ABSOLUTE IMMATURE GRANULOCYTE: <0.03 K/UL (ref 0–0.3)
ABSOLUTE LYMPH #: 2.4 K/UL (ref 1.1–3.7)
ABSOLUTE MONO #: 0.61 K/UL (ref 0.1–1.2)
ALBUMIN SERPL-MCNC: 4 G/DL (ref 3.5–5.2)
ALBUMIN/GLOBULIN RATIO: 1.1 (ref 1–2.5)
ALP BLD-CCNC: 62 U/L (ref 35–104)
ALT SERPL-CCNC: 82 U/L (ref 5–33)
AMORPHOUS: NORMAL
ANION GAP SERPL CALCULATED.3IONS-SCNC: 13 MMOL/L (ref 9–17)
AST SERPL-CCNC: 62 U/L
BACTERIA: NORMAL
BASOPHILS # BLD: 1 % (ref 0–2)
BASOPHILS ABSOLUTE: 0.06 K/UL (ref 0–0.2)
BILIRUB SERPL-MCNC: 0.55 MG/DL (ref 0.3–1.2)
BILIRUBIN URINE: NEGATIVE
BUN BLDV-MCNC: 9 MG/DL (ref 6–20)
BUN/CREAT BLD: ABNORMAL (ref 9–20)
CALCIUM SERPL-MCNC: 9.2 MG/DL (ref 8.6–10.4)
CASTS UA: NORMAL /LPF (ref 0–8)
CHLORIDE BLD-SCNC: 105 MMOL/L (ref 98–107)
CO2: 23 MMOL/L (ref 20–31)
COLOR: ABNORMAL
COMMENT UA: ABNORMAL
CREAT SERPL-MCNC: 0.55 MG/DL (ref 0.5–0.9)
CRYSTALS, UA: NORMAL /HPF
DIFFERENTIAL TYPE: ABNORMAL
EOSINOPHILS RELATIVE PERCENT: 2 % (ref 1–4)
EPITHELIAL CELLS UA: NORMAL /HPF (ref 0–5)
GFR AFRICAN AMERICAN: >60 ML/MIN
GFR NON-AFRICAN AMERICAN: >60 ML/MIN
GFR SERPL CREATININE-BSD FRML MDRD: ABNORMAL ML/MIN/{1.73_M2}
GFR SERPL CREATININE-BSD FRML MDRD: ABNORMAL ML/MIN/{1.73_M2}
GLUCOSE BLD-MCNC: 88 MG/DL (ref 70–99)
GLUCOSE URINE: NEGATIVE
HCG QUANTITATIVE: <1 IU/L
HCT VFR BLD CALC: 43.5 % (ref 36.3–47.1)
HEMOGLOBIN: 14.5 G/DL (ref 11.9–15.1)
IMMATURE GRANULOCYTES: 0 %
KETONES, URINE: NEGATIVE
LEUKOCYTE ESTERASE, URINE: ABNORMAL
LIPASE: 22 U/L (ref 13–60)
LYMPHOCYTES # BLD: 25 % (ref 24–43)
MCH RBC QN AUTO: 32.6 PG (ref 25.2–33.5)
MCHC RBC AUTO-ENTMCNC: 33.3 G/DL (ref 28.4–34.8)
MCV RBC AUTO: 97.8 FL (ref 82.6–102.9)
MONOCYTES # BLD: 6 % (ref 3–12)
MUCUS: NORMAL
NITRITE, URINE: NEGATIVE
NRBC AUTOMATED: 0 PER 100 WBC
OTHER OBSERVATIONS UA: NORMAL
PDW BLD-RTO: 13.2 % (ref 11.8–14.4)
PH UA: 5.5 (ref 5–8)
PLATELET # BLD: 267 K/UL (ref 138–453)
PLATELET ESTIMATE: ABNORMAL
PMV BLD AUTO: 10.9 FL (ref 8.1–13.5)
POTASSIUM SERPL-SCNC: 4.5 MMOL/L (ref 3.7–5.3)
PROTEIN UA: ABNORMAL
RBC # BLD: 4.45 M/UL (ref 3.95–5.11)
RBC # BLD: ABNORMAL 10*6/UL
RBC UA: NORMAL /HPF (ref 0–4)
RENAL EPITHELIAL, UA: NORMAL /HPF
SEG NEUTROPHILS: 66 % (ref 36–65)
SEGMENTED NEUTROPHILS ABSOLUTE COUNT: 6.43 K/UL (ref 1.5–8.1)
SODIUM BLD-SCNC: 141 MMOL/L (ref 135–144)
SPECIFIC GRAVITY UA: 1.03 (ref 1–1.03)
TOTAL PROTEIN: 7.5 G/DL (ref 6.4–8.3)
TRICHOMONAS: NORMAL
TURBIDITY: ABNORMAL
URINE HGB: ABNORMAL
UROBILINOGEN, URINE: NORMAL
WBC # BLD: 9.7 K/UL (ref 3.5–11.3)
WBC # BLD: ABNORMAL 10*3/UL
WBC UA: NORMAL /HPF (ref 0–5)
YEAST: NORMAL

## 2019-09-13 PROCEDURE — 99284 EMERGENCY DEPT VISIT MOD MDM: CPT

## 2019-09-13 PROCEDURE — 81001 URINALYSIS AUTO W/SCOPE: CPT

## 2019-09-13 PROCEDURE — 84702 CHORIONIC GONADOTROPIN TEST: CPT

## 2019-09-13 PROCEDURE — 87086 URINE CULTURE/COLONY COUNT: CPT

## 2019-09-13 PROCEDURE — 80053 COMPREHEN METABOLIC PANEL: CPT

## 2019-09-13 PROCEDURE — 6360000002 HC RX W HCPCS: Performed by: STUDENT IN AN ORGANIZED HEALTH CARE EDUCATION/TRAINING PROGRAM

## 2019-09-13 PROCEDURE — 96375 TX/PRO/DX INJ NEW DRUG ADDON: CPT

## 2019-09-13 PROCEDURE — 76705 ECHO EXAM OF ABDOMEN: CPT

## 2019-09-13 PROCEDURE — 96374 THER/PROPH/DIAG INJ IV PUSH: CPT

## 2019-09-13 PROCEDURE — 83690 ASSAY OF LIPASE: CPT

## 2019-09-13 PROCEDURE — 2580000003 HC RX 258: Performed by: STUDENT IN AN ORGANIZED HEALTH CARE EDUCATION/TRAINING PROGRAM

## 2019-09-13 PROCEDURE — 85025 COMPLETE CBC W/AUTO DIFF WBC: CPT

## 2019-09-13 PROCEDURE — 96376 TX/PRO/DX INJ SAME DRUG ADON: CPT

## 2019-09-13 RX ORDER — PROCHLORPERAZINE MALEATE 10 MG
10 TABLET ORAL EVERY 6 HOURS PRN
Qty: 30 TABLET | Refills: 0 | Status: SHIPPED | OUTPATIENT
Start: 2019-09-13 | End: 2020-02-24

## 2019-09-13 RX ORDER — ONDANSETRON 2 MG/ML
4 INJECTION INTRAMUSCULAR; INTRAVENOUS ONCE
Status: COMPLETED | OUTPATIENT
Start: 2019-09-13 | End: 2019-09-13

## 2019-09-13 RX ORDER — MORPHINE SULFATE 4 MG/ML
4 INJECTION, SOLUTION INTRAMUSCULAR; INTRAVENOUS ONCE
Status: COMPLETED | OUTPATIENT
Start: 2019-09-13 | End: 2019-09-13

## 2019-09-13 RX ORDER — DICYCLOMINE HYDROCHLORIDE 10 MG/1
10 CAPSULE ORAL
Qty: 60 CAPSULE | Refills: 0 | Status: SHIPPED | OUTPATIENT
Start: 2019-09-13 | End: 2020-02-24

## 2019-09-13 RX ORDER — PROCHLORPERAZINE EDISYLATE 5 MG/ML
10 INJECTION INTRAMUSCULAR; INTRAVENOUS ONCE
Status: COMPLETED | OUTPATIENT
Start: 2019-09-13 | End: 2019-09-13

## 2019-09-13 RX ORDER — SODIUM CHLORIDE, SODIUM LACTATE, POTASSIUM CHLORIDE, CALCIUM CHLORIDE 600; 310; 30; 20 MG/100ML; MG/100ML; MG/100ML; MG/100ML
1000 INJECTION, SOLUTION INTRAVENOUS ONCE
Status: COMPLETED | OUTPATIENT
Start: 2019-09-13 | End: 2019-09-13

## 2019-09-13 RX ADMIN — MORPHINE SULFATE 4 MG: 4 INJECTION INTRAVENOUS at 12:29

## 2019-09-13 RX ADMIN — ONDANSETRON 4 MG: 2 INJECTION INTRAMUSCULAR; INTRAVENOUS at 10:36

## 2019-09-13 RX ADMIN — ONDANSETRON 4 MG: 2 INJECTION INTRAMUSCULAR; INTRAVENOUS at 12:29

## 2019-09-13 RX ADMIN — PROCHLORPERAZINE EDISYLATE 10 MG: 5 INJECTION INTRAMUSCULAR; INTRAVENOUS at 14:21

## 2019-09-13 RX ADMIN — MORPHINE SULFATE 4 MG: 4 INJECTION INTRAVENOUS at 10:36

## 2019-09-13 RX ADMIN — SODIUM CHLORIDE, POTASSIUM CHLORIDE, SODIUM LACTATE AND CALCIUM CHLORIDE 1000 ML: 600; 310; 30; 20 INJECTION, SOLUTION INTRAVENOUS at 10:36

## 2019-09-13 ASSESSMENT — PAIN DESCRIPTION - DESCRIPTORS: DESCRIPTORS: CRAMPING

## 2019-09-13 ASSESSMENT — PAIN DESCRIPTION - LOCATION: LOCATION: ABDOMEN

## 2019-09-13 ASSESSMENT — ENCOUNTER SYMPTOMS
VOMITING: 1
NAUSEA: 1
TROUBLE SWALLOWING: 0
ABDOMINAL PAIN: 1
SORE THROAT: 0
SHORTNESS OF BREATH: 0

## 2019-09-13 ASSESSMENT — PAIN SCALES - GENERAL
PAINLEVEL_OUTOF10: 6
PAINLEVEL_OUTOF10: 6

## 2019-09-13 ASSESSMENT — PAIN DESCRIPTION - ORIENTATION: ORIENTATION: RIGHT;UPPER

## 2019-09-13 ASSESSMENT — PAIN DESCRIPTION - FREQUENCY: FREQUENCY: CONTINUOUS

## 2019-09-13 ASSESSMENT — PAIN DESCRIPTION - PAIN TYPE: TYPE: ACUTE PAIN

## 2019-09-13 NOTE — ED PROVIDER NOTES
Appendix    Result Date: 9/13/2019  EXAMINATION: RIGHT LOWER QUADRANT ULTRASOUND 9/13/2019 10:45 am COMPARISON: None. HISTORY: ORDERING SYSTEM PROVIDED HISTORY: ABDOMINAL PAIN, RLQ FINDINGS: Imaging in the right lower quadrant, right upper quadrant and near the umbilicus reveals no identifiable appendix. No ascites or loculated fluid collection demonstrated. The right ovary is identified measuring 4.0 x 2.3 x 3.1 cm with appropriate Doppler signal.     The appendix is not visualized. No ascites or fluid collection in the visualized right abdomen. Possibility of acute appendicitis is not excluded and further evaluation with CT may be warranted. The visualized right ovary reveals no evidence for torsion. Us Gallbladder Ruq    Result Date: 9/13/2019  EXAMINATION: RIGHT UPPER QUADRANT ULTRASOUND 9/13/2019 10:44 am COMPARISON: None HISTORY: ORDERING SYSTEM PROVIDED HISTORY: RUQ pain, pos Buck's FINDINGS: LIVER:  Liver demonstrates increased echogenicity consistent with fatty infiltration without focal mass or ductal dilatation. Normal hepatopetal flow is noted in the portal vein. BILIARY SYSTEM:  The gallbladder contains multiple echogenic foci, mobile, consistent with cholelithiasis. No gallbladder wall thickening, pericholecystic fluid or positive sonographic Jeremias Franky sign is noted. Common bile duct is within normal limits measuring 5.6 mm. RIGHT KIDNEY: The right kidney is grossly unremarkable without evidence of hydronephrosis. PANCREAS:  Visualized portions of the pancreas are unremarkable. OTHER: No evidence of right upper quadrant ascites. Cholelithiasis without ductal dilatation. Coarse echotexture in the liver consistent with fatty infiltration.        EKG  None    All EKG's are interpreted by the Emergency Department Physician who either signs or Co-signs this chart in the absence of a cardiologist.    EMERGENCY DEPARTMENT COURSE:  Patient found resting comfortably in bed, no acute distress, not ill or toxic appearing. Engaged and cooperative in exam.  Physical exam notable for right upper quadrant and right lower quadrant tenderness with involuntary guarding. Pain of both upper and lower right side made worse with palpation of the left side. This is not the patient's typical Crohn's pain concern for appendicitis versus cholecystitis versus other. Initial work-up obtained as well as ultrasound of the right upper quadrant and appendix. Notable for no visualization of the appendix and cholelithiasis without cholecystitis. Given the history of hemicolectomy with Crohn's and severity of abdominal exam surgery consulted. Surgery requested MRE magnetic resonance imaging for further evaluation of patient's Crohn's disease. Due to the technical nature of this exam MRI technologist's were unsure of their ability to complete the scan from the emergency department. After several hours of waiting the patient had symptomatic improvement and was requesting to be discharged with outpatient follow-up instead. Through shared decision making with the patient and discussions with surgery was felt that this was reasonable considering the technical nature of the study needed and improvement in initial presenting symptoms. While in the emergency department abdominal pain was managed with morphine and Zofran with moderate improvement. Patient felt significantly better after the addition of Compazine to her medication regimen. Patient subsequently discharged with Compazine and Bentyl for outpatient symptomatic management while awaiting GI follow-up and scheduling of imaging study as an outpatient. Patient felt comfortable being discharged with the plan set in place and understood all return precautions. Discharge plan discussed with patient who is in agreement. Educated on likely pathology, medications, return precautions, and follow-up.   Patient understood all educated materials with all questions answered to

## 2019-09-13 NOTE — CONSULTS
CONSULT NOTE    PATIENT NAME: Sujit Spencer  AGE: 36 y.o. MEDICAL RECORD NO. [id number]  DATE: 9/13/2019  SURGEON:  Yamileth Wilson  PRIMARY CARE PHYSICIAN: Abhinav Swanson, APRN - CNP    Patient evaluated at the request of  Dr. Delaney Kan  Reason for evaluation: RUQ pain, concern for cholecystitis    Patient information was obtained from patient. History/Exam limitations: none. Patient presented to the Emergency Department by private vehicle. 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 female who presents with intermittent sharp/burning pain to RUQ that lasts for \"a couple minutes\" and n/v. She was sleeping and was woken with the pain. The pain has been worsening over the past few days. Tylenol has not worked. She continues to have bowel movements that are loose but normal for her. No blood in stool or emesis. Hx hemicolectomy with 1ft small intestine removed per patient hx, performed 9-10 years ago. She had an EGD in April but is unsure of findings. Symptom onset has been constant for a time period of 1 week(s). Severity is described as moderate. Course of her symptoms over time is constant. 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 Past Surgical History   has a past surgical history that includes hemicolectomy; Tympanostomy tube placement; Tonsillectomy and adenoidectomy; LEEP (08/29/2006); and Tooth Extraction (12/2015).     Medications  Prior to Admission medications    Medication Sig Start Date End Date Taking? Authorizing Provider   famotidine (PEPCID) 20 MG tablet Take 1 tablet by mouth 2 times daily 3/22/18   Shemar Mccarthy MD   ondansetron (ZOFRAN ODT) 4 MG disintegrating tablet Take 1 tablet by mouth every 6 hours as needed for Nausea 8/1/17   Bonnee Romberg,    famotidine (PEPCID) 20 MG tablet Take 1 tablet by mouth 2 times daily 8/1/17   Bonnee Romberg, DO   aluminum & magnesium hydroxide-simethicone (MAALOX ADVANCED MAX ST) 400-400-40 MG/5ML SUSP Take 30 mLs by mouth every 6 hours as needed (indigestion, stomach pain/discomfort) 8/1/17   Bonnee Romberg, DO   venlafaxine (EFFEXOR XR) 37.5 MG XR capsule 1 po qd x 1 week and then 2 po qd 6/3/16   Kristina Ornelas PA-C   omeprazole (PRILOSEC) 40 MG capsule Take 1 capsule by mouth daily 5/13/16   Tomas Ornelas PA-C    Scheduled Meds:  Continuous Infusions:  PRN Meds:. Allergies  is allergic to claritin [loratadine]; guaifenesin er; remicade [infliximab]; and robitussin (alcohol free) [guaifenesin]. Family History  family history includes Cancer in her maternal grandfather; Depression in her mother; Hypertension in her maternal grandmother, mother, and sister. Social History   reports that she quit smoking about 8 years ago. She has never used smokeless tobacco.   reports that she does not drink alcohol. reports that she does not use drugs.       REVIEW OF SYSTEMS:   Constitutional: negative for chills and fevers  Eyes: negative for pain with eye movement, visual disturbance, diplopia  Ears, nose, mouth, throat, and face: negative for epistaxis, facial trauma, hearing loss, voice change  Respiratory: negative for hemoptysis, pleurisy/chest pain, shortness of breath, wheezing  Cardiovascular: negative for chest pain, irregular heart beat, near-syncope, palpitations and syncope  Gastrointestinal: negative for abdominal pain, nausea, vomiting, fecal incontinence  Genitourinary:negative for dysuria and urinary incontinence  Hematologic/lymphatic:

## 2019-09-13 NOTE — ED NOTES
Pt is nausea's, states the pain is more controlled.  Resident notified, awaiting orders      Mariluz Smith RN  09/13/19 4121

## 2019-09-13 NOTE — ED NOTES
Pt called out, states she needs to go, the person watching her child has some place to be and she needs to leave. Pt asking to be discharged. Resident notified.       Gena Scruggs, MAURILIO  09/13/19 0528

## 2019-09-14 LAB
CULTURE: NORMAL
Lab: NORMAL
SPECIMEN DESCRIPTION: NORMAL

## 2019-09-25 ENCOUNTER — OFFICE VISIT (OUTPATIENT)
Dept: SURGERY | Age: 34
End: 2019-09-25
Payer: MEDICARE

## 2019-09-25 VITALS
HEART RATE: 72 BPM | SYSTOLIC BLOOD PRESSURE: 133 MMHG | HEIGHT: 64 IN | TEMPERATURE: 97.4 F | WEIGHT: 203.2 LBS | DIASTOLIC BLOOD PRESSURE: 84 MMHG | BODY MASS INDEX: 34.69 KG/M2

## 2019-09-25 DIAGNOSIS — K80.20 SYMPTOMATIC CHOLELITHIASIS: Primary | ICD-10-CM

## 2019-09-25 PROCEDURE — 99203 OFFICE O/P NEW LOW 30 MIN: CPT | Performed by: SURGERY

## 2019-09-30 ENCOUNTER — ANESTHESIA (OUTPATIENT)
Dept: OPERATING ROOM | Age: 34
End: 2019-09-30
Payer: MEDICARE

## 2019-09-30 ENCOUNTER — ANESTHESIA EVENT (OUTPATIENT)
Dept: OPERATING ROOM | Age: 34
End: 2019-09-30
Payer: MEDICARE

## 2019-09-30 ENCOUNTER — HOSPITAL ENCOUNTER (OUTPATIENT)
Age: 34
Setting detail: OUTPATIENT SURGERY
Discharge: HOME | End: 2019-09-30
Attending: SURGERY | Admitting: SURGERY
Payer: MEDICARE

## 2019-09-30 VITALS
HEART RATE: 96 BPM | HEIGHT: 63 IN | SYSTOLIC BLOOD PRESSURE: 112 MMHG | RESPIRATION RATE: 14 BRPM | OXYGEN SATURATION: 100 % | DIASTOLIC BLOOD PRESSURE: 68 MMHG | BODY MASS INDEX: 35.08 KG/M2 | WEIGHT: 198 LBS | TEMPERATURE: 97.9 F

## 2019-09-30 VITALS — DIASTOLIC BLOOD PRESSURE: 55 MMHG | OXYGEN SATURATION: 100 % | TEMPERATURE: 96.9 F | SYSTOLIC BLOOD PRESSURE: 118 MMHG

## 2019-09-30 DIAGNOSIS — Z90.49 S/P LAPAROSCOPIC CHOLECYSTECTOMY: Primary | ICD-10-CM

## 2019-09-30 LAB — HCG, PREGNANCY URINE (POC): NEGATIVE

## 2019-09-30 PROCEDURE — 3600000009 HC SURGERY ROBOT BASE: Performed by: SURGERY

## 2019-09-30 PROCEDURE — 6360000002 HC RX W HCPCS: Performed by: ANESTHESIOLOGY

## 2019-09-30 PROCEDURE — 81025 URINE PREGNANCY TEST: CPT

## 2019-09-30 PROCEDURE — 3600000019 HC SURGERY ROBOT ADDTL 15MIN: Performed by: SURGERY

## 2019-09-30 PROCEDURE — 2500000003 HC RX 250 WO HCPCS: Performed by: NURSE ANESTHETIST, CERTIFIED REGISTERED

## 2019-09-30 PROCEDURE — 6370000000 HC RX 637 (ALT 250 FOR IP): Performed by: ANESTHESIOLOGY

## 2019-09-30 PROCEDURE — 7100000011 HC PHASE II RECOVERY - ADDTL 15 MIN: Performed by: SURGERY

## 2019-09-30 PROCEDURE — 6360000002 HC RX W HCPCS

## 2019-09-30 PROCEDURE — 7100000001 HC PACU RECOVERY - ADDTL 15 MIN: Performed by: SURGERY

## 2019-09-30 PROCEDURE — 6360000002 HC RX W HCPCS: Performed by: NURSE ANESTHETIST, CERTIFIED REGISTERED

## 2019-09-30 PROCEDURE — 2500000003 HC RX 250 WO HCPCS: Performed by: ANESTHESIOLOGY

## 2019-09-30 PROCEDURE — 2500000003 HC RX 250 WO HCPCS: Performed by: SURGERY

## 2019-09-30 PROCEDURE — 3700000001 HC ADD 15 MINUTES (ANESTHESIA): Performed by: SURGERY

## 2019-09-30 PROCEDURE — S2900 ROBOTIC SURGICAL SYSTEM: HCPCS | Performed by: SURGERY

## 2019-09-30 PROCEDURE — 2709999900 HC NON-CHARGEABLE SUPPLY: Performed by: SURGERY

## 2019-09-30 PROCEDURE — 7100000000 HC PACU RECOVERY - FIRST 15 MIN: Performed by: SURGERY

## 2019-09-30 PROCEDURE — C1729 CATH, DRAINAGE: HCPCS | Performed by: SURGERY

## 2019-09-30 PROCEDURE — 7100000010 HC PHASE II RECOVERY - FIRST 15 MIN: Performed by: SURGERY

## 2019-09-30 PROCEDURE — 88304 TISSUE EXAM BY PATHOLOGIST: CPT

## 2019-09-30 PROCEDURE — 64488 TAP BLOCK BI INJECTION: CPT | Performed by: ANESTHESIOLOGY

## 2019-09-30 PROCEDURE — 3700000000 HC ANESTHESIA ATTENDED CARE: Performed by: SURGERY

## 2019-09-30 PROCEDURE — 2580000003 HC RX 258: Performed by: ANESTHESIOLOGY

## 2019-09-30 RX ORDER — PROPOFOL 10 MG/ML
INJECTION, EMULSION INTRAVENOUS PRN
Status: DISCONTINUED | OUTPATIENT
Start: 2019-09-30 | End: 2019-09-30 | Stop reason: SDUPTHER

## 2019-09-30 RX ORDER — DEXAMETHASONE SODIUM PHOSPHATE 10 MG/ML
INJECTION INTRAMUSCULAR; INTRAVENOUS PRN
Status: DISCONTINUED | OUTPATIENT
Start: 2019-09-30 | End: 2019-09-30 | Stop reason: SDUPTHER

## 2019-09-30 RX ORDER — LIDOCAINE HYDROCHLORIDE 10 MG/ML
1 INJECTION, SOLUTION EPIDURAL; INFILTRATION; INTRACAUDAL; PERINEURAL
Status: DISCONTINUED | OUTPATIENT
Start: 2019-09-30 | End: 2019-09-30 | Stop reason: HOSPADM

## 2019-09-30 RX ORDER — INDOCYANINE GREEN AND WATER 25 MG
5 KIT INJECTION ONCE
Status: COMPLETED | OUTPATIENT
Start: 2019-09-30 | End: 2019-09-30

## 2019-09-30 RX ORDER — NEOSTIGMINE METHYLSULFATE 5 MG/5 ML
SYRINGE (ML) INTRAVENOUS PRN
Status: DISCONTINUED | OUTPATIENT
Start: 2019-09-30 | End: 2019-09-30 | Stop reason: SDUPTHER

## 2019-09-30 RX ORDER — ONDANSETRON 2 MG/ML
4 INJECTION INTRAMUSCULAR; INTRAVENOUS
Status: COMPLETED | OUTPATIENT
Start: 2019-09-30 | End: 2019-09-30

## 2019-09-30 RX ORDER — FENTANYL CITRATE 50 UG/ML
50 INJECTION, SOLUTION INTRAMUSCULAR; INTRAVENOUS EVERY 5 MIN PRN
Status: COMPLETED | OUTPATIENT
Start: 2019-09-30 | End: 2019-09-30

## 2019-09-30 RX ORDER — ROCURONIUM BROMIDE 10 MG/ML
INJECTION, SOLUTION INTRAVENOUS PRN
Status: DISCONTINUED | OUTPATIENT
Start: 2019-09-30 | End: 2019-09-30 | Stop reason: SDUPTHER

## 2019-09-30 RX ORDER — SCOLOPAMINE TRANSDERMAL SYSTEM 1 MG/1
1 PATCH, EXTENDED RELEASE TRANSDERMAL ONCE
Status: DISCONTINUED | OUTPATIENT
Start: 2019-09-30 | End: 2019-09-30 | Stop reason: HOSPADM

## 2019-09-30 RX ORDER — FENTANYL CITRATE 50 UG/ML
INJECTION, SOLUTION INTRAMUSCULAR; INTRAVENOUS
Status: COMPLETED
Start: 2019-09-30 | End: 2019-09-30

## 2019-09-30 RX ORDER — BUPIVACAINE HYDROCHLORIDE 5 MG/ML
30 INJECTION, SOLUTION EPIDURAL; INTRACAUDAL ONCE
Status: COMPLETED | OUTPATIENT
Start: 2019-09-30 | End: 2019-09-30

## 2019-09-30 RX ORDER — OXYCODONE HYDROCHLORIDE AND ACETAMINOPHEN 5; 325 MG/1; MG/1
1 TABLET ORAL EVERY 6 HOURS PRN
Qty: 28 TABLET | Refills: 0 | Status: SHIPPED | OUTPATIENT
Start: 2019-09-30 | End: 2019-10-07

## 2019-09-30 RX ORDER — DOCUSATE SODIUM 100 MG/1
100 CAPSULE, LIQUID FILLED ORAL 2 TIMES DAILY
Qty: 60 CAPSULE | Refills: 0 | Status: SHIPPED | OUTPATIENT
Start: 2019-09-30 | End: 2019-10-30

## 2019-09-30 RX ORDER — LABETALOL HYDROCHLORIDE 5 MG/ML
5 INJECTION, SOLUTION INTRAVENOUS EVERY 10 MIN PRN
Status: DISCONTINUED | OUTPATIENT
Start: 2019-09-30 | End: 2019-09-30 | Stop reason: HOSPADM

## 2019-09-30 RX ORDER — FENTANYL CITRATE 50 UG/ML
50 INJECTION, SOLUTION INTRAMUSCULAR; INTRAVENOUS ONCE
Status: DISCONTINUED | OUTPATIENT
Start: 2019-09-30 | End: 2019-09-30 | Stop reason: HOSPADM

## 2019-09-30 RX ORDER — LIDOCAINE HYDROCHLORIDE 10 MG/ML
INJECTION, SOLUTION EPIDURAL; INFILTRATION; INTRACAUDAL; PERINEURAL PRN
Status: DISCONTINUED | OUTPATIENT
Start: 2019-09-30 | End: 2019-09-30 | Stop reason: SDUPTHER

## 2019-09-30 RX ORDER — BUPIVACAINE HYDROCHLORIDE 5 MG/ML
INJECTION, SOLUTION EPIDURAL; INTRACAUDAL
Status: COMPLETED | OUTPATIENT
Start: 2019-09-30 | End: 2019-09-30

## 2019-09-30 RX ORDER — MIDAZOLAM HYDROCHLORIDE 1 MG/ML
2 INJECTION INTRAMUSCULAR; INTRAVENOUS
Status: DISCONTINUED | OUTPATIENT
Start: 2019-09-30 | End: 2019-09-30 | Stop reason: HOSPADM

## 2019-09-30 RX ORDER — FENTANYL CITRATE 50 UG/ML
INJECTION, SOLUTION INTRAMUSCULAR; INTRAVENOUS PRN
Status: DISCONTINUED | OUTPATIENT
Start: 2019-09-30 | End: 2019-09-30 | Stop reason: SDUPTHER

## 2019-09-30 RX ORDER — GLYCOPYRROLATE 1 MG/5 ML
SYRINGE (ML) INTRAVENOUS PRN
Status: DISCONTINUED | OUTPATIENT
Start: 2019-09-30 | End: 2019-09-30 | Stop reason: SDUPTHER

## 2019-09-30 RX ORDER — ONDANSETRON 4 MG/1
4 TABLET, FILM COATED ORAL 3 TIMES DAILY PRN
Qty: 30 TABLET | Refills: 0 | Status: SHIPPED | OUTPATIENT
Start: 2019-09-30 | End: 2020-02-24

## 2019-09-30 RX ORDER — MIDAZOLAM HYDROCHLORIDE 1 MG/ML
INJECTION INTRAMUSCULAR; INTRAVENOUS
Status: COMPLETED
Start: 2019-09-30 | End: 2019-09-30

## 2019-09-30 RX ORDER — CEFAZOLIN SODIUM 1 G/3ML
INJECTION, POWDER, FOR SOLUTION INTRAMUSCULAR; INTRAVENOUS PRN
Status: DISCONTINUED | OUTPATIENT
Start: 2019-09-30 | End: 2019-09-30 | Stop reason: SDUPTHER

## 2019-09-30 RX ORDER — BUPIVACAINE HYDROCHLORIDE 2.5 MG/ML
30 INJECTION, SOLUTION EPIDURAL; INFILTRATION; INTRACAUDAL ONCE
Status: DISCONTINUED | OUTPATIENT
Start: 2019-09-30 | End: 2019-09-30 | Stop reason: HOSPADM

## 2019-09-30 RX ORDER — ONDANSETRON 2 MG/ML
INJECTION INTRAMUSCULAR; INTRAVENOUS PRN
Status: DISCONTINUED | OUTPATIENT
Start: 2019-09-30 | End: 2019-09-30 | Stop reason: SDUPTHER

## 2019-09-30 RX ORDER — DIPHENHYDRAMINE HYDROCHLORIDE 50 MG/ML
INJECTION INTRAMUSCULAR; INTRAVENOUS PRN
Status: DISCONTINUED | OUTPATIENT
Start: 2019-09-30 | End: 2019-09-30 | Stop reason: SDUPTHER

## 2019-09-30 RX ORDER — SODIUM CHLORIDE, SODIUM LACTATE, POTASSIUM CHLORIDE, CALCIUM CHLORIDE 600; 310; 30; 20 MG/100ML; MG/100ML; MG/100ML; MG/100ML
INJECTION, SOLUTION INTRAVENOUS CONTINUOUS
Status: DISCONTINUED | OUTPATIENT
Start: 2019-09-30 | End: 2019-09-30 | Stop reason: HOSPADM

## 2019-09-30 RX ORDER — OXYCODONE HYDROCHLORIDE AND ACETAMINOPHEN 5; 325 MG/1; MG/1
1 TABLET ORAL
Status: COMPLETED | OUTPATIENT
Start: 2019-09-30 | End: 2019-09-30

## 2019-09-30 RX ADMIN — FENTANYL CITRATE 50 MCG: 50 INJECTION INTRAMUSCULAR; INTRAVENOUS at 15:41

## 2019-09-30 RX ADMIN — SODIUM CHLORIDE, POTASSIUM CHLORIDE, SODIUM LACTATE AND CALCIUM CHLORIDE: 600; 310; 30; 20 INJECTION, SOLUTION INTRAVENOUS at 15:30

## 2019-09-30 RX ADMIN — DEXAMETHASONE SODIUM PHOSPHATE 10 MG: 10 INJECTION INTRAMUSCULAR; INTRAVENOUS at 15:25

## 2019-09-30 RX ADMIN — BUPIVACAINE HYDROCHLORIDE 20 ML: 5 INJECTION, SOLUTION EPIDURAL; INTRACAUDAL; PERINEURAL at 14:37

## 2019-09-30 RX ADMIN — FENTANYL CITRATE 100 MCG: 50 INJECTION INTRAMUSCULAR; INTRAVENOUS at 12:45

## 2019-09-30 RX ADMIN — FENTANYL CITRATE 50 MCG: 50 INJECTION INTRAMUSCULAR; INTRAVENOUS at 15:28

## 2019-09-30 RX ADMIN — FENTANYL CITRATE 50 MCG: 50 INJECTION INTRAMUSCULAR; INTRAVENOUS at 16:47

## 2019-09-30 RX ADMIN — FENTANYL CITRATE 50 MCG: 50 INJECTION INTRAMUSCULAR; INTRAVENOUS at 15:16

## 2019-09-30 RX ADMIN — ONDANSETRON 4 MG: 2 INJECTION, SOLUTION INTRAMUSCULAR; INTRAVENOUS at 15:50

## 2019-09-30 RX ADMIN — INDOCYANINE GREEN AND WATER 5 MG: KIT at 11:50

## 2019-09-30 RX ADMIN — SODIUM CHLORIDE, POTASSIUM CHLORIDE, SODIUM LACTATE AND CALCIUM CHLORIDE: 600; 310; 30; 20 INJECTION, SOLUTION INTRAVENOUS at 13:59

## 2019-09-30 RX ADMIN — PROPOFOL 200 MG: 10 INJECTION, EMULSION INTRAVENOUS at 15:08

## 2019-09-30 RX ADMIN — Medication 12.5 MG: at 15:25

## 2019-09-30 RX ADMIN — ROCURONIUM BROMIDE 40 MG: 10 INJECTION INTRAVENOUS at 15:08

## 2019-09-30 RX ADMIN — Medication 3.5 MG: at 15:58

## 2019-09-30 RX ADMIN — FENTANYL CITRATE 50 MCG: 50 INJECTION INTRAMUSCULAR; INTRAVENOUS at 15:08

## 2019-09-30 RX ADMIN — Medication 40 ML: at 12:54

## 2019-09-30 RX ADMIN — ONDANSETRON 4 MG: 2 INJECTION INTRAMUSCULAR; INTRAVENOUS at 17:25

## 2019-09-30 RX ADMIN — OXYCODONE HYDROCHLORIDE AND ACETAMINOPHEN 1 TABLET: 5; 325 TABLET ORAL at 18:07

## 2019-09-30 RX ADMIN — Medication 0.7 MG: at 15:58

## 2019-09-30 RX ADMIN — Medication 20 ML: at 12:54

## 2019-09-30 RX ADMIN — MIDAZOLAM HYDROCHLORIDE 2 MG: 1 INJECTION, SOLUTION INTRAMUSCULAR; INTRAVENOUS at 12:54

## 2019-09-30 RX ADMIN — LIDOCAINE HYDROCHLORIDE 5 MG: 10 INJECTION, SOLUTION EPIDURAL; INFILTRATION; INTRACAUDAL; PERINEURAL at 15:08

## 2019-09-30 RX ADMIN — CEFAZOLIN 2000 MG: 1 INJECTION, POWDER, FOR SOLUTION INTRAMUSCULAR; INTRAVENOUS at 15:17

## 2019-09-30 ASSESSMENT — PULMONARY FUNCTION TESTS
PIF_VALUE: 24
PIF_VALUE: 16
PIF_VALUE: 14
PIF_VALUE: 1
PIF_VALUE: 4
PIF_VALUE: 23
PIF_VALUE: 16
PIF_VALUE: 17
PIF_VALUE: 23
PIF_VALUE: 21
PIF_VALUE: 15
PIF_VALUE: 23
PIF_VALUE: 23
PIF_VALUE: 1
PIF_VALUE: 23
PIF_VALUE: 23
PIF_VALUE: 15
PIF_VALUE: 23
PIF_VALUE: 23
PIF_VALUE: 13
PIF_VALUE: 14
PIF_VALUE: 23
PIF_VALUE: 14
PIF_VALUE: 23
PIF_VALUE: 16
PIF_VALUE: 23
PIF_VALUE: 24
PIF_VALUE: 18
PIF_VALUE: 18
PIF_VALUE: 13
PIF_VALUE: 24
PIF_VALUE: 23
PIF_VALUE: 4
PIF_VALUE: 24
PIF_VALUE: 13
PIF_VALUE: 23
PIF_VALUE: 16
PIF_VALUE: 21
PIF_VALUE: 23
PIF_VALUE: 16
PIF_VALUE: 1
PIF_VALUE: 27
PIF_VALUE: 16
PIF_VALUE: 16
PIF_VALUE: 23
PIF_VALUE: 12
PIF_VALUE: 1
PIF_VALUE: 16
PIF_VALUE: 16
PIF_VALUE: 23
PIF_VALUE: 1
PIF_VALUE: 14
PIF_VALUE: 14
PIF_VALUE: 23
PIF_VALUE: 24
PIF_VALUE: 16
PIF_VALUE: 24
PIF_VALUE: 16
PIF_VALUE: 23

## 2019-09-30 ASSESSMENT — PAIN SCALES - GENERAL
PAINLEVEL_OUTOF10: 5
PAINLEVEL_OUTOF10: 0
PAINLEVEL_OUTOF10: 10
PAINLEVEL_OUTOF10: 7
PAINLEVEL_OUTOF10: 7
PAINLEVEL_OUTOF10: 0
PAINLEVEL_OUTOF10: 0
PAINLEVEL_OUTOF10: 10

## 2019-09-30 ASSESSMENT — PAIN - FUNCTIONAL ASSESSMENT: PAIN_FUNCTIONAL_ASSESSMENT: 0-10

## 2019-09-30 ASSESSMENT — PAIN DESCRIPTION - PAIN TYPE: TYPE: SURGICAL PAIN

## 2019-09-30 ASSESSMENT — PAIN DESCRIPTION - ORIENTATION: ORIENTATION: MID

## 2019-09-30 ASSESSMENT — PAIN DESCRIPTION - LOCATION: LOCATION: ABDOMEN

## 2019-09-30 NOTE — ANESTHESIA POSTPROCEDURE EVALUATION
Department of Anesthesiology  Postprocedure Note    Patient: Amber Valdivia  MRN: [id number]  YOB: 1985  Date of evaluation: 2019  Time:  7:35 PM     Procedure Summary     Date:  19 Room / Location:  [place] [address] / [facility] OR    Anesthesia Start:  1504 Anesthesia Stop:  7421    Procedure:  XI LAPAROSCOPIC ROBOTIC CHOLECYSTECTOMY, FIREFLY (N/A Abdomen) Diagnosis:  (SYMPTOMATIC CHOLELITHIASIS)    Surgeon:  Yamilex Bunn IV, DO Responsible Provider:  Frandy Ty MD    Anesthesia Type:  general, regional ASA Status:  3          Anesthesia Type: general, regional    Rui Phase I: Rui Score: 10    Rui Phase II: Rui Score: 10    Last vitals: Reviewed and per EMR flowsheets.        Anesthesia Post Evaluation   Vital Signs (Current)   Vitals:    19 181   BP: 112/68   Pulse: 96   Resp: 14   Temp: 97.9 °F (36.6 °C)   SpO2:      Vital Signs Statistics (for past 48 hrs)     Temp  Av.9 °F (36.1 °C)  Min: 96.4 °F (35.8 °C)   Min taken time: 19 1526  Max: 97.9 °F (36.6 °C)   Max taken time: 19 181  Pulse  Av.1  Min: 71   Min taken time: 19 1128  Max: 96   Max taken time: 19 1815  Resp  Avg: 10.6  Min: 0   Min taken time: 19 1613  Max: 29   Max taken time: 19 1511  BP  Min: 96/66   Min taken time: 19 1558  Max: 144/88   Max taken time: 19 1509  SpO2  Av.8 %  Min: 95 %   Min taken time: 19 1730  Max: 100 %   Max taken time: 19 1612    BP Readings from Last 3 Encounters:   19 112/68   19 (!) 118/55   19 133/84             Level of Consciousness:  Awake    Respiratory:  Stable    Airway :   Patent    Oxygen Saturation:  Stable    Cardiovascular:  Stable    Hydration:  Adequate    PONV:  Stable    Post-op Pain:  Adequate analgesia    Post-op Assessment:  No apparent anesthetic complications    Additional Follow-Up / Treatment / Comment:  None

## 2019-10-02 LAB — SURGICAL PATHOLOGY REPORT: NORMAL

## 2019-10-23 ENCOUNTER — OFFICE VISIT (OUTPATIENT)
Dept: SURGERY | Age: 34
End: 2019-10-23
Payer: MEDICARE

## 2019-10-23 VITALS
BODY MASS INDEX: 35.51 KG/M2 | OXYGEN SATURATION: 100 % | DIASTOLIC BLOOD PRESSURE: 86 MMHG | SYSTOLIC BLOOD PRESSURE: 120 MMHG | WEIGHT: 200.4 LBS | HEART RATE: 86 BPM | HEIGHT: 63 IN | TEMPERATURE: 97 F

## 2019-10-23 DIAGNOSIS — Z09 POSTOP CHECK: Primary | ICD-10-CM

## 2019-10-23 PROCEDURE — 99211 OFF/OP EST MAY X REQ PHY/QHP: CPT | Performed by: SURGERY

## 2019-10-23 PROCEDURE — G8417 CALC BMI ABV UP PARAM F/U: HCPCS | Performed by: SURGERY

## 2019-10-23 PROCEDURE — G8427 DOCREV CUR MEDS BY ELIG CLIN: HCPCS | Performed by: SURGERY

## 2019-10-24 ENCOUNTER — OFFICE VISIT (OUTPATIENT)
Dept: OBGYN CLINIC | Age: 34
End: 2019-10-24
Payer: MEDICARE

## 2019-10-24 ENCOUNTER — HOSPITAL ENCOUNTER (OUTPATIENT)
Age: 34
Setting detail: SPECIMEN
Discharge: HOME | End: 2019-10-24
Payer: MEDICARE

## 2019-10-24 VITALS
HEART RATE: 74 BPM | WEIGHT: 199 LBS | DIASTOLIC BLOOD PRESSURE: 80 MMHG | BODY MASS INDEX: 33.97 KG/M2 | SYSTOLIC BLOOD PRESSURE: 102 MMHG | HEIGHT: 64 IN

## 2019-10-24 DIAGNOSIS — Z01.419 WOMEN'S ANNUAL ROUTINE GYNECOLOGICAL EXAMINATION: Primary | ICD-10-CM

## 2019-10-24 DIAGNOSIS — N92.6 IRREGULAR PERIODS: ICD-10-CM

## 2019-10-24 DIAGNOSIS — Z20.2 STD EXPOSURE: ICD-10-CM

## 2019-10-24 DIAGNOSIS — N83.202 CYST OF LEFT OVARY: ICD-10-CM

## 2019-10-24 PROCEDURE — G8484 FLU IMMUNIZE NO ADMIN: HCPCS | Performed by: ADVANCED PRACTICE MIDWIFE

## 2019-10-24 PROCEDURE — G0101 CA SCREEN;PELVIC/BREAST EXAM: HCPCS | Performed by: ADVANCED PRACTICE MIDWIFE

## 2019-10-25 DIAGNOSIS — N76.0 BV (BACTERIAL VAGINOSIS): Primary | ICD-10-CM

## 2019-10-25 DIAGNOSIS — B96.89 BV (BACTERIAL VAGINOSIS): Primary | ICD-10-CM

## 2019-10-25 DIAGNOSIS — Z20.2 STD EXPOSURE: ICD-10-CM

## 2019-10-25 LAB
DIRECT EXAM: ABNORMAL
Lab: ABNORMAL
SPECIMEN DESCRIPTION: ABNORMAL

## 2019-10-25 RX ORDER — METRONIDAZOLE 500 MG/1
500 TABLET ORAL 2 TIMES DAILY
Qty: 14 TABLET | Refills: 0 | Status: SHIPPED | OUTPATIENT
Start: 2019-10-25 | End: 2019-11-01

## 2019-10-26 ASSESSMENT — ENCOUNTER SYMPTOMS
EYES NEGATIVE: 1
RESPIRATORY NEGATIVE: 1
GASTROINTESTINAL NEGATIVE: 1
ALLERGIC/IMMUNOLOGIC NEGATIVE: 1

## 2019-10-28 DIAGNOSIS — N83.202 CYST OF LEFT OVARY: Primary | ICD-10-CM

## 2019-10-29 LAB
CHLAMYDIA BY THIN PREP: ABNORMAL
N. GONORRHOEAE DNA, THIN PREP: NEGATIVE
SPECIMEN DESCRIPTION: ABNORMAL

## 2019-10-30 ENCOUNTER — TELEPHONE (OUTPATIENT)
Dept: OBGYN CLINIC | Age: 34
End: 2019-10-30

## 2019-10-30 LAB
CYTOLOGY REPORT: NORMAL
HPV SAMPLE: NORMAL
HPV, GENOTYPE 16: NOT DETECTED
HPV, GENOTYPE 18: NOT DETECTED
HPV, HIGH RISK OTHER: NOT DETECTED
HPV, INTERPRETATION: NORMAL
SPECIMEN DESCRIPTION: NORMAL

## 2019-10-31 ENCOUNTER — HOSPITAL ENCOUNTER (OUTPATIENT)
Age: 34
Discharge: HOME | End: 2019-10-31
Payer: MEDICARE

## 2019-10-31 ENCOUNTER — TELEPHONE (OUTPATIENT)
Dept: OBGYN CLINIC | Age: 34
End: 2019-10-31

## 2019-10-31 DIAGNOSIS — A74.9 CHLAMYDIA: Primary | ICD-10-CM

## 2019-10-31 DIAGNOSIS — N92.6 IRREGULAR PERIODS: ICD-10-CM

## 2019-10-31 DIAGNOSIS — Z20.2 STD EXPOSURE: ICD-10-CM

## 2019-10-31 DIAGNOSIS — N83.202 CYST OF LEFT OVARY: ICD-10-CM

## 2019-10-31 LAB
DHEAS (DHEA SULFATE): 70.4 UG/DL (ref 45–270)
ESTIMATED AVERAGE GLUCOSE: 91 MG/DL
HBA1C MFR BLD: 4.8 % (ref 4–6)
HCG QUANTITATIVE: <1 IU/L
HIV AG/AB: NONREACTIVE
SEX HORMONE BINDING GLOBULIN: 59 NMOL/L (ref 30–135)
T. PALLIDUM, IGG: NONREACTIVE
TESTOSTERONE FREE-NONMALE: 6.3 PG/ML (ref 1.3–9.2)
TESTOSTERONE TOTAL: 51 NG/DL (ref 20–70)

## 2019-10-31 PROCEDURE — 82627 DEHYDROEPIANDROSTERONE: CPT

## 2019-10-31 PROCEDURE — 84270 ASSAY OF SEX HORMONE GLOBUL: CPT

## 2019-10-31 PROCEDURE — 83036 HEMOGLOBIN GLYCOSYLATED A1C: CPT

## 2019-10-31 PROCEDURE — 84702 CHORIONIC GONADOTROPIN TEST: CPT

## 2019-10-31 PROCEDURE — 87389 HIV-1 AG W/HIV-1&-2 AB AG IA: CPT

## 2019-10-31 PROCEDURE — 84403 ASSAY OF TOTAL TESTOSTERONE: CPT

## 2019-10-31 PROCEDURE — 36415 COLL VENOUS BLD VENIPUNCTURE: CPT

## 2019-10-31 PROCEDURE — 86780 TREPONEMA PALLIDUM: CPT

## 2019-10-31 RX ORDER — AZITHROMYCIN 500 MG/1
1000 TABLET, FILM COATED ORAL ONCE
Qty: 2 TABLET | Refills: 1 | Status: SHIPPED | OUTPATIENT
Start: 2019-10-31 | End: 2019-10-31

## 2019-11-08 ENCOUNTER — HOSPITAL ENCOUNTER (OUTPATIENT)
Dept: ULTRASOUND IMAGING | Age: 34
Discharge: HOME | End: 2019-11-10
Payer: MEDICARE

## 2019-11-08 DIAGNOSIS — N83.202 CYST OF LEFT OVARY: ICD-10-CM

## 2019-11-08 PROCEDURE — 76856 US EXAM PELVIC COMPLETE: CPT

## 2019-11-11 PROBLEM — N83.202 CYST OF LEFT OVARY: Status: ACTIVE | Noted: 2019-11-11

## 2019-11-14 ENCOUNTER — HOSPITAL ENCOUNTER (OUTPATIENT)
Age: 34
Setting detail: SPECIMEN
Discharge: HOME | End: 2019-11-14
Payer: MEDICARE

## 2019-11-14 ENCOUNTER — OFFICE VISIT (OUTPATIENT)
Dept: OBGYN CLINIC | Age: 34
End: 2019-11-14
Payer: MEDICARE

## 2019-11-14 VITALS
SYSTOLIC BLOOD PRESSURE: 114 MMHG | DIASTOLIC BLOOD PRESSURE: 64 MMHG | HEIGHT: 63 IN | WEIGHT: 199.9 LBS | BODY MASS INDEX: 35.42 KG/M2

## 2019-11-14 DIAGNOSIS — Z20.2 STD EXPOSURE: ICD-10-CM

## 2019-11-14 DIAGNOSIS — Z86.19 HISTORY OF CHLAMYDIA: Primary | ICD-10-CM

## 2019-11-14 DIAGNOSIS — R74.8 ELEVATED LIVER ENZYMES: ICD-10-CM

## 2019-11-14 PROCEDURE — G8427 DOCREV CUR MEDS BY ELIG CLIN: HCPCS | Performed by: ADVANCED PRACTICE MIDWIFE

## 2019-11-14 PROCEDURE — 99213 OFFICE O/P EST LOW 20 MIN: CPT | Performed by: ADVANCED PRACTICE MIDWIFE

## 2019-11-14 PROCEDURE — G8417 CALC BMI ABV UP PARAM F/U: HCPCS | Performed by: ADVANCED PRACTICE MIDWIFE

## 2019-11-14 PROCEDURE — 1036F TOBACCO NON-USER: CPT | Performed by: ADVANCED PRACTICE MIDWIFE

## 2019-11-14 PROCEDURE — G8484 FLU IMMUNIZE NO ADMIN: HCPCS | Performed by: ADVANCED PRACTICE MIDWIFE

## 2019-11-18 LAB
C TRACH DNA GENITAL QL NAA+PROBE: NEGATIVE
N. GONORRHOEAE DNA: NEGATIVE
SPECIMEN DESCRIPTION: NORMAL

## 2019-11-21 ENCOUNTER — HOSPITAL ENCOUNTER (OUTPATIENT)
Age: 34
Discharge: HOME | End: 2019-11-21
Payer: MEDICARE

## 2019-11-21 DIAGNOSIS — R74.8 ELEVATED LIVER ENZYMES: ICD-10-CM

## 2019-11-21 DIAGNOSIS — Z20.2 STD EXPOSURE: ICD-10-CM

## 2019-11-21 LAB
ALBUMIN SERPL-MCNC: 4.3 G/DL (ref 3.5–5.2)
ALBUMIN/GLOBULIN RATIO: 1.2 (ref 1–2.5)
ALP BLD-CCNC: 67 U/L (ref 35–104)
ALT SERPL-CCNC: 71 U/L (ref 5–33)
ANION GAP SERPL CALCULATED.3IONS-SCNC: 11 MMOL/L (ref 9–17)
AST SERPL-CCNC: 45 U/L
BILIRUB SERPL-MCNC: 0.5 MG/DL (ref 0.3–1.2)
BILIRUBIN DIRECT: 0.09 MG/DL
BILIRUBIN, INDIRECT: 0.41 MG/DL (ref 0–1)
BUN BLDV-MCNC: 12 MG/DL (ref 6–20)
CALCIUM SERPL-MCNC: 9.6 MG/DL (ref 8.6–10.4)
CHLORIDE BLD-SCNC: 103 MMOL/L (ref 98–107)
CHOLESTEROL/HDL RATIO: 4.7
CHOLESTEROL: 214 MG/DL
CO2: 26 MMOL/L (ref 20–31)
CREAT SERPL-MCNC: 0.57 MG/DL (ref 0.5–0.9)
ESTIMATED AVERAGE GLUCOSE: 91 MG/DL
GFR AFRICAN AMERICAN: >60 ML/MIN
GFR NON-AFRICAN AMERICAN: >60 ML/MIN
GFR SERPL CREATININE-BSD FRML MDRD: NORMAL ML/MIN/{1.73_M2}
GFR SERPL CREATININE-BSD FRML MDRD: NORMAL ML/MIN/{1.73_M2}
GLOBULIN: ABNORMAL G/DL (ref 1.5–3.8)
GLUCOSE BLD-MCNC: 82 MG/DL (ref 70–99)
HBA1C MFR BLD: 4.8 % (ref 4–6)
HCT VFR BLD CALC: 43.9 % (ref 36.3–47.1)
HDLC SERPL-MCNC: 46 MG/DL
HEMOGLOBIN: 14.6 G/DL (ref 11.9–15.1)
HEPATITIS C ANTIBODY: NONREACTIVE
IRON: 116 UG/DL (ref 37–145)
LDL CHOLESTEROL: 118 MG/DL (ref 0–130)
MCH RBC QN AUTO: 32.4 PG (ref 25.2–33.5)
MCHC RBC AUTO-ENTMCNC: 33.3 G/DL (ref 28.4–34.8)
MCV RBC AUTO: 97.3 FL (ref 82.6–102.9)
NRBC AUTOMATED: 0 PER 100 WBC
PDW BLD-RTO: 12.6 % (ref 11.8–14.4)
PLATELET # BLD: 279 K/UL (ref 138–453)
PMV BLD AUTO: 10.6 FL (ref 8.1–13.5)
POTASSIUM SERPL-SCNC: 3.9 MMOL/L (ref 3.7–5.3)
RBC # BLD: 4.51 M/UL (ref 3.95–5.11)
SODIUM BLD-SCNC: 140 MMOL/L (ref 135–144)
TOTAL PROTEIN: 7.9 G/DL (ref 6.4–8.3)
TRIGL SERPL-MCNC: 249 MG/DL
TSH SERPL DL<=0.05 MIU/L-ACNC: 1.17 MIU/L (ref 0.3–5)
VITAMIN B-12: 410 PG/ML (ref 232–1245)
VITAMIN D 25-HYDROXY: 19.1 NG/ML (ref 30–100)
VLDLC SERPL CALC-MCNC: ABNORMAL MG/DL (ref 1–30)
WBC # BLD: 9.2 K/UL (ref 3.5–11.3)

## 2019-11-21 PROCEDURE — 83036 HEMOGLOBIN GLYCOSYLATED A1C: CPT

## 2019-11-21 PROCEDURE — 85027 COMPLETE CBC AUTOMATED: CPT

## 2019-11-21 PROCEDURE — 83540 ASSAY OF IRON: CPT

## 2019-11-21 PROCEDURE — 86803 HEPATITIS C AB TEST: CPT

## 2019-11-21 PROCEDURE — 80076 HEPATIC FUNCTION PANEL: CPT

## 2019-11-21 PROCEDURE — 84443 ASSAY THYROID STIM HORMONE: CPT

## 2019-11-21 PROCEDURE — 36415 COLL VENOUS BLD VENIPUNCTURE: CPT

## 2019-11-21 PROCEDURE — 84520 ASSAY OF UREA NITROGEN: CPT

## 2019-11-21 PROCEDURE — 82310 ASSAY OF CALCIUM: CPT

## 2019-11-21 PROCEDURE — 82306 VITAMIN D 25 HYDROXY: CPT

## 2019-11-21 PROCEDURE — 82947 ASSAY GLUCOSE BLOOD QUANT: CPT

## 2019-11-21 PROCEDURE — 82607 VITAMIN B-12: CPT

## 2019-11-21 PROCEDURE — 80061 LIPID PANEL: CPT

## 2019-11-21 PROCEDURE — 82565 ASSAY OF CREATININE: CPT

## 2019-11-21 PROCEDURE — 80051 ELECTROLYTE PANEL: CPT

## 2019-12-10 ENCOUNTER — TELEPHONE (OUTPATIENT)
Dept: GASTROENTEROLOGY | Age: 34
End: 2019-12-10

## 2020-01-24 ENCOUNTER — TELEPHONE (OUTPATIENT)
Dept: GASTROENTEROLOGY | Age: 35
End: 2020-01-24

## 2020-01-24 NOTE — TELEPHONE ENCOUNTER
LVM for pt that appt on 01/27 was rescheduled to 02/24 @ 300 due to provider ill.  Also to have pt call if the new date/time did not work and needed to be rescheduled

## 2020-02-24 ENCOUNTER — OFFICE VISIT (OUTPATIENT)
Dept: GASTROENTEROLOGY | Age: 35
End: 2020-02-24
Payer: MEDICARE

## 2020-02-24 VITALS
WEIGHT: 202.5 LBS | DIASTOLIC BLOOD PRESSURE: 93 MMHG | SYSTOLIC BLOOD PRESSURE: 152 MMHG | HEART RATE: 70 BPM | BODY MASS INDEX: 35.87 KG/M2

## 2020-02-24 PROCEDURE — 99204 OFFICE O/P NEW MOD 45 MIN: CPT | Performed by: INTERNAL MEDICINE

## 2020-02-24 PROCEDURE — G8427 DOCREV CUR MEDS BY ELIG CLIN: HCPCS | Performed by: INTERNAL MEDICINE

## 2020-02-24 PROCEDURE — G8484 FLU IMMUNIZE NO ADMIN: HCPCS | Performed by: INTERNAL MEDICINE

## 2020-02-24 PROCEDURE — G8417 CALC BMI ABV UP PARAM F/U: HCPCS | Performed by: INTERNAL MEDICINE

## 2020-02-24 RX ORDER — POLYETHYLENE GLYCOL 3350 17 G/17G
POWDER, FOR SOLUTION ORAL
Qty: 238 G | Refills: 0 | Status: SHIPPED | OUTPATIENT
Start: 2020-02-24 | End: 2021-04-06

## 2020-02-24 ASSESSMENT — ENCOUNTER SYMPTOMS
ANAL BLEEDING: 0
NAUSEA: 0
SINUS PRESSURE: 0
WHEEZING: 0
TROUBLE SWALLOWING: 0
ABDOMINAL DISTENTION: 0
DIARRHEA: 1
SORE THROAT: 0
CHOKING: 0
CONSTIPATION: 0
BACK PAIN: 0
ABDOMINAL PAIN: 0
COUGH: 0
RECTAL PAIN: 0
BLOOD IN STOOL: 0
VOMITING: 0
VOICE CHANGE: 0

## 2020-02-24 NOTE — PROGRESS NOTES
Subjective:      Patient ID: Porfirio Lewis is a 36 y.o. female. HPI    Dr. Rosemarie Nicholson, APRN - CNP has requested that I see Porfirio Lewis for a consult for   1. Crohn's disease of small intestine with complication ([address]    2. Diarrhea, unspecified type    3. Abdominal pain, generalized    4. Irritable bowel syndrome with diarrhea    5. Anxiety     . Surgery in 2010 at [facility] [address] [place] she had rt hemicolectomy   She was then diagnosed with crohn's   She was then followed by dr Yann Castro and then at [facility]   She was on steroids and the in 2013 stopped giving her Hemera as she was pregnant   She then learned that his GI would not take her insurance  Then at [facility] and had colonoscopy   Then she had colon in 2016 and 2019  She in on Entyvio infusion   She has not been taking her [place]   It appears that she has compliance issues    Her current symptoms now are abdominal pains  Has diarrhea 7-8 times  She has no bleeding    She is a not a smoker  No ETOH   Has no sig GERD  She had GB surgery in the past    Patient has been complaining of some abdominal pains, off and on cramping  Also complains of abdominal bloating and gas  Has off and on nausea without any sig vomiting  Has some alternating constipation and diarrhea  Has no weight loss  Has some anxiety issues    Past Medical, Family, and Social History reviewed and does contribute to the patient presenting condition. patient\"s PMH/PSH,SH,PSYCH hx, MEDs, ALLERGIES, and ROS was all reviewed and updated ion the appropriate sections    Review of Systems   Constitutional: Positive for unexpected weight change. Negative for appetite change and fatigue. HENT: Negative for dental problem, postnasal drip, sinus pressure, sore throat, trouble swallowing and voice change. Eyes: Negative for visual disturbance. Respiratory: Negative for cough, choking and wheezing. Cardiovascular: Negative for chest pain, palpitations and leg swelling.    Gastrointestinal: Positive for to the patient in detail  The prep and NPO were explained  All the Risks, Benefits, and Alternatives were explained  Risk of Bleeding, Perforation and Cardio Respiratory risks were explained  her questions were answered  The procedure has been scheduled with the  in the office  Patient was asked to give us a call for any questions  The patient has verbalized understanding and agreement to this plan. The patient was instructed to start taking some OTC Probiotics products   These are available over the counter at the Pharmacy stores and Grocery stores  He was explained about the beneficial effects they have in the GI track  They will help to establish the good bacterial zaida and will help with the digestion and bowel movements  The patient has verbalized understanding and agreement to this plan    Pt was given advices and instructions about weight loss. She was advised about the significance of exercise at least three times a week . Dietary advices were also given about the avoidance of fast food, fried food and lots of spices and grease. nstructions were given about using ample amount of fiber including dietary and supplemental fiber either metamucil, bennafiber or citrucell etc.  Pt was advised about drinking ample amount of water without any colors or chemicals. Stress was given about regular exercise. Pt was told to stay way from soda pops.     Pt has verbalized understanding and agrrement    The patient was instructed to start taking some OTC Probiotics products   These are available over the counter at the Pharmacy stores and Grocery stores  He was explained about the beneficial effects they have in the GI track  They will help to establish the good bacterial zaida and will help with the digestion and bowel movements  The patient has verbalized understanding and agreement to this plan      More than half of patient's clinic visit time was spent in counseling about lifestyle and dietary

## 2020-03-17 ENCOUNTER — TELEPHONE (OUTPATIENT)
Dept: GASTROENTEROLOGY | Age: 35
End: 2020-03-17

## 2020-03-17 NOTE — TELEPHONE ENCOUNTER
Yevgeniy Marroquin called to reschedule colonoscopy on 03/23/20 with Dr Vincent Roa due to Covid-19. She is now rescheduled [facility] Monday 06/01/20 @ 11:30 am colonoscopy [facility] Dr Vincent Roa. She is changing dates on her bowel prep instructions, new instructions scanned.

## 2020-05-20 NOTE — TELEPHONE ENCOUNTER
Pt called into the office to cancel her 6/1/20 colon procedure d/t she does not want another bill. She was informed by the hospital her portion to pay out of pocket would be $140. Pt states she is unable to afford another bill at this time and would like to cancel. Writer offered the [employer] and explained to pt about the program.  Pt would like an application mailed to her home address. She states she will fill out and see what type of assistance she qualifies for and will call us back to resched after she knows information. [employer] Application mailed to pt' s home address.

## 2020-07-27 ENCOUNTER — APPOINTMENT (OUTPATIENT)
Dept: CT IMAGING | Age: 35
End: 2020-07-27
Payer: COMMERCIAL

## 2020-07-27 ENCOUNTER — HOSPITAL ENCOUNTER (EMERGENCY)
Age: 35
Discharge: HOME | End: 2020-07-27
Attending: EMERGENCY MEDICINE
Payer: COMMERCIAL

## 2020-07-27 ENCOUNTER — APPOINTMENT (OUTPATIENT)
Dept: ULTRASOUND IMAGING | Age: 35
End: 2020-07-27
Payer: COMMERCIAL

## 2020-07-27 VITALS
OXYGEN SATURATION: 97 % | WEIGHT: 200 LBS | HEART RATE: 62 BPM | RESPIRATION RATE: 16 BRPM | SYSTOLIC BLOOD PRESSURE: 126 MMHG | BODY MASS INDEX: 35.44 KG/M2 | DIASTOLIC BLOOD PRESSURE: 74 MMHG | HEIGHT: 63 IN

## 2020-07-27 LAB
-: NORMAL
ABSOLUTE EOS #: 0.2 K/UL (ref 0–0.44)
ABSOLUTE IMMATURE GRANULOCYTE: <0.03 K/UL (ref 0–0.3)
ABSOLUTE LYMPH #: 2.38 K/UL (ref 1.1–3.7)
ABSOLUTE MONO #: 0.66 K/UL (ref 0.1–1.2)
ALBUMIN SERPL-MCNC: 3.9 G/DL (ref 3.5–5.2)
ALBUMIN/GLOBULIN RATIO: 1.3 (ref 1–2.5)
ALP BLD-CCNC: 79 U/L (ref 35–104)
ALT SERPL-CCNC: 170 U/L (ref 5–33)
AMORPHOUS: NORMAL
ANION GAP SERPL CALCULATED.3IONS-SCNC: 11 MMOL/L (ref 9–17)
AST SERPL-CCNC: 111 U/L
BACTERIA: NORMAL
BASOPHILS # BLD: 1 % (ref 0–2)
BASOPHILS ABSOLUTE: 0.07 K/UL (ref 0–0.2)
BILIRUB SERPL-MCNC: 0.68 MG/DL (ref 0.3–1.2)
BILIRUBIN URINE: NEGATIVE
BUN BLDV-MCNC: 9 MG/DL (ref 6–20)
BUN/CREAT BLD: ABNORMAL (ref 9–20)
CALCIUM SERPL-MCNC: 8.8 MG/DL (ref 8.6–10.4)
CASTS UA: NORMAL /LPF (ref 0–8)
CHLORIDE BLD-SCNC: 105 MMOL/L (ref 98–107)
CO2: 23 MMOL/L (ref 20–31)
COLOR: ABNORMAL
COMMENT UA: ABNORMAL
CREAT SERPL-MCNC: 0.62 MG/DL (ref 0.5–0.9)
CRYSTALS, UA: NORMAL /HPF
DIFFERENTIAL TYPE: ABNORMAL
DIRECT EXAM: NORMAL
EOSINOPHILS RELATIVE PERCENT: 2 % (ref 1–4)
EPITHELIAL CELLS UA: NORMAL /HPF (ref 0–5)
GFR AFRICAN AMERICAN: >60 ML/MIN
GFR NON-AFRICAN AMERICAN: >60 ML/MIN
GFR SERPL CREATININE-BSD FRML MDRD: ABNORMAL ML/MIN/{1.73_M2}
GFR SERPL CREATININE-BSD FRML MDRD: ABNORMAL ML/MIN/{1.73_M2}
GLUCOSE BLD-MCNC: 106 MG/DL (ref 70–99)
GLUCOSE URINE: NEGATIVE
HCG QUALITATIVE: NEGATIVE
HCT VFR BLD CALC: 40.4 % (ref 36.3–47.1)
HEMOGLOBIN: 13.6 G/DL (ref 11.9–15.1)
IMMATURE GRANULOCYTES: 0 %
KETONES, URINE: NEGATIVE
LEUKOCYTE ESTERASE, URINE: ABNORMAL
LIPASE: 22 U/L (ref 13–60)
LYMPHOCYTES # BLD: 24 % (ref 24–43)
Lab: NORMAL
MCH RBC QN AUTO: 32.2 PG (ref 25.2–33.5)
MCHC RBC AUTO-ENTMCNC: 33.7 G/DL (ref 28.4–34.8)
MCV RBC AUTO: 95.7 FL (ref 82.6–102.9)
MONOCYTES # BLD: 7 % (ref 3–12)
MUCUS: NORMAL
NITRITE, URINE: NEGATIVE
NRBC AUTOMATED: 0 PER 100 WBC
OTHER OBSERVATIONS UA: NORMAL
PDW BLD-RTO: 13.2 % (ref 11.8–14.4)
PH UA: 5.5 (ref 5–8)
PLATELET # BLD: 282 K/UL (ref 138–453)
PLATELET ESTIMATE: ABNORMAL
PMV BLD AUTO: 10.8 FL (ref 8.1–13.5)
POTASSIUM SERPL-SCNC: 4 MMOL/L (ref 3.7–5.3)
PROTEIN UA: ABNORMAL
RBC # BLD: 4.22 M/UL (ref 3.95–5.11)
RBC # BLD: ABNORMAL 10*6/UL
RBC UA: NORMAL /HPF (ref 0–4)
RENAL EPITHELIAL, UA: NORMAL /HPF
SEG NEUTROPHILS: 66 % (ref 36–65)
SEGMENTED NEUTROPHILS ABSOLUTE COUNT: 6.55 K/UL (ref 1.5–8.1)
SODIUM BLD-SCNC: 139 MMOL/L (ref 135–144)
SPECIFIC GRAVITY UA: 1.06 (ref 1–1.03)
SPECIMEN DESCRIPTION: NORMAL
TOTAL PROTEIN: 6.9 G/DL (ref 6.4–8.3)
TRICHOMONAS: NORMAL
TURBIDITY: ABNORMAL
URINE HGB: ABNORMAL
UROBILINOGEN, URINE: NORMAL
WBC # BLD: 9.9 K/UL (ref 3.5–11.3)
WBC # BLD: ABNORMAL 10*3/UL
WBC UA: NORMAL /HPF (ref 0–5)
YEAST: NORMAL

## 2020-07-27 PROCEDURE — 74177 CT ABD & PELVIS W/CONTRAST: CPT

## 2020-07-27 PROCEDURE — 2580000003 HC RX 258: Performed by: STUDENT IN AN ORGANIZED HEALTH CARE EDUCATION/TRAINING PROGRAM

## 2020-07-27 PROCEDURE — 87591 N.GONORRHOEAE DNA AMP PROB: CPT

## 2020-07-27 PROCEDURE — 96376 TX/PRO/DX INJ SAME DRUG ADON: CPT

## 2020-07-27 PROCEDURE — 87510 GARDNER VAG DNA DIR PROBE: CPT

## 2020-07-27 PROCEDURE — 87660 TRICHOMONAS VAGIN DIR PROBE: CPT

## 2020-07-27 PROCEDURE — 83690 ASSAY OF LIPASE: CPT

## 2020-07-27 PROCEDURE — 81001 URINALYSIS AUTO W/SCOPE: CPT

## 2020-07-27 PROCEDURE — 99284 EMERGENCY DEPT VISIT MOD MDM: CPT

## 2020-07-27 PROCEDURE — 6360000002 HC RX W HCPCS: Performed by: STUDENT IN AN ORGANIZED HEALTH CARE EDUCATION/TRAINING PROGRAM

## 2020-07-27 PROCEDURE — 87480 CANDIDA DNA DIR PROBE: CPT

## 2020-07-27 PROCEDURE — 76830 TRANSVAGINAL US NON-OB: CPT

## 2020-07-27 PROCEDURE — 87491 CHLMYD TRACH DNA AMP PROBE: CPT

## 2020-07-27 PROCEDURE — 85025 COMPLETE CBC W/AUTO DIFF WBC: CPT

## 2020-07-27 PROCEDURE — 84703 CHORIONIC GONADOTROPIN ASSAY: CPT

## 2020-07-27 PROCEDURE — 93976 VASCULAR STUDY: CPT

## 2020-07-27 PROCEDURE — 96374 THER/PROPH/DIAG INJ IV PUSH: CPT

## 2020-07-27 PROCEDURE — 6360000004 HC RX CONTRAST MEDICATION: Performed by: STUDENT IN AN ORGANIZED HEALTH CARE EDUCATION/TRAINING PROGRAM

## 2020-07-27 PROCEDURE — 96375 TX/PRO/DX INJ NEW DRUG ADDON: CPT

## 2020-07-27 PROCEDURE — 80053 COMPREHEN METABOLIC PANEL: CPT

## 2020-07-27 RX ORDER — OXYCODONE HYDROCHLORIDE AND ACETAMINOPHEN 5; 325 MG/1; MG/1
1 TABLET ORAL EVERY 6 HOURS PRN
Qty: 10 TABLET | Refills: 0 | Status: SHIPPED | OUTPATIENT
Start: 2020-07-27 | End: 2020-07-30

## 2020-07-27 RX ORDER — FENTANYL CITRATE 50 UG/ML
50 INJECTION, SOLUTION INTRAMUSCULAR; INTRAVENOUS ONCE
Status: COMPLETED | OUTPATIENT
Start: 2020-07-27 | End: 2020-07-27

## 2020-07-27 RX ORDER — KETOROLAC TROMETHAMINE 30 MG/ML
30 INJECTION, SOLUTION INTRAMUSCULAR; INTRAVENOUS ONCE
Status: COMPLETED | OUTPATIENT
Start: 2020-07-27 | End: 2020-07-27

## 2020-07-27 RX ORDER — SODIUM CHLORIDE, SODIUM LACTATE, POTASSIUM CHLORIDE, CALCIUM CHLORIDE 600; 310; 30; 20 MG/100ML; MG/100ML; MG/100ML; MG/100ML
1000 INJECTION, SOLUTION INTRAVENOUS ONCE
Status: COMPLETED | OUTPATIENT
Start: 2020-07-27 | End: 2020-07-27

## 2020-07-27 RX ORDER — TAMSULOSIN HYDROCHLORIDE 0.4 MG/1
0.4 CAPSULE ORAL DAILY
Qty: 5 CAPSULE | Refills: 0 | Status: SHIPPED | OUTPATIENT
Start: 2020-07-27 | End: 2020-10-27

## 2020-07-27 RX ORDER — ONDANSETRON 2 MG/ML
4 INJECTION INTRAMUSCULAR; INTRAVENOUS ONCE
Status: COMPLETED | OUTPATIENT
Start: 2020-07-27 | End: 2020-07-27

## 2020-07-27 RX ORDER — IBUPROFEN 800 MG/1
800 TABLET ORAL 2 TIMES DAILY PRN
Qty: 60 TABLET | Refills: 0 | Status: SHIPPED | OUTPATIENT
Start: 2020-07-27 | End: 2020-10-27

## 2020-07-27 RX ADMIN — KETOROLAC TROMETHAMINE 30 MG: 30 INJECTION, SOLUTION INTRAMUSCULAR at 18:39

## 2020-07-27 RX ADMIN — IOHEXOL 75 ML: 350 INJECTION, SOLUTION INTRAVENOUS at 17:29

## 2020-07-27 RX ADMIN — FENTANYL CITRATE 50 MCG: 50 INJECTION, SOLUTION INTRAMUSCULAR; INTRAVENOUS at 15:53

## 2020-07-27 RX ADMIN — SODIUM CHLORIDE, POTASSIUM CHLORIDE, SODIUM LACTATE AND CALCIUM CHLORIDE 1000 ML: 600; 310; 30; 20 INJECTION, SOLUTION INTRAVENOUS at 13:29

## 2020-07-27 RX ADMIN — FENTANYL CITRATE 50 MCG: 50 INJECTION, SOLUTION INTRAMUSCULAR; INTRAVENOUS at 13:30

## 2020-07-27 RX ADMIN — ONDANSETRON 4 MG: 2 INJECTION INTRAMUSCULAR; INTRAVENOUS at 13:29

## 2020-07-27 ASSESSMENT — PAIN SCALES - GENERAL
PAINLEVEL_OUTOF10: 5
PAINLEVEL_OUTOF10: 10
PAINLEVEL_OUTOF10: 10
PAINLEVEL_OUTOF10: 6

## 2020-07-27 ASSESSMENT — PAIN DESCRIPTION - PAIN TYPE: TYPE: ACUTE PAIN

## 2020-07-27 ASSESSMENT — ENCOUNTER SYMPTOMS
BACK PAIN: 0
WHEEZING: 0
SHORTNESS OF BREATH: 0
ABDOMINAL PAIN: 1
TROUBLE SWALLOWING: 0

## 2020-07-27 ASSESSMENT — PAIN DESCRIPTION - ONSET: ONSET: GRADUAL

## 2020-07-27 ASSESSMENT — PAIN DESCRIPTION - DESCRIPTORS: DESCRIPTORS: ACHING

## 2020-07-27 ASSESSMENT — PAIN DESCRIPTION - FREQUENCY: FREQUENCY: CONTINUOUS

## 2020-07-27 ASSESSMENT — PAIN DESCRIPTION - LOCATION: LOCATION: ABDOMEN

## 2020-07-27 ASSESSMENT — PAIN DESCRIPTION - PROGRESSION: CLINICAL_PROGRESSION: GRADUALLY WORSENING

## 2020-07-27 ASSESSMENT — PAIN DESCRIPTION - ORIENTATION: ORIENTATION: LEFT;LOWER

## 2020-07-27 NOTE — ED PROVIDER NOTES
[facility] ED  Emergency Department Encounter  EmergencyMedicine Resident     Pt Name:More Hardy  MRN: [id number]  [place] 1985  Date of evaluation: 20  PCP:  Melinda Grayson PA-C    CHIEF COMPLAINT       Chief Complaint   Patient presents with    Abdominal Pain       HISTORY OF PRESENT ILLNESS  (Location/Symptom, Timing/Onset, Context/Setting, Quality, Duration, Modifying Factors, Severity.)      Lord Willard is a 36 y.o. female who presents with sudden onset left lower quadrant abdominal pain with associated nausea and vomiting. Patient states pain began 2 hours prior to arrival while she was driving. Patient reports history of Crohn's disease with prior hemicolectomy as well as a left ovarian cyst.  Denies any bloody stools, bloody emesis, chest pain, shortness of breath, fever or chills. Denies vaginal bleeding or discharge. 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 has a past surgical history that includes hemicolectomy; Tympanostomy tube placement; Tonsillectomy and adenoidectomy; LEEP (2006); Tooth Extraction (2015); Cholecystectomy, laparoscopic (2019); and Cholecystectomy, laparoscopic (N/A, 2019).       Social History     Socioeconomic History    Marital status: Single     Spouse name: Not on file    Number of children: Not on file    Years of education: Not on file    Highest education level: Not on file   Occupational History    Not on file   Social Needs    Financial resource strain: Not on file    Food insecurity     Worry: Not on file     Inability: Not on file    Transportation needs     Medical: Not on file     Non-medical: Not on file   Tobacco Use    Smoking status: Former Smoker     Last attempt to quit: 10/2/2010     Years since quittin.8    Smokeless tobacco: Never Used   Substance and Sexual Activity    Alcohol use: No    Drug use: No    Sexual activity: Not Currently     Partners: Male   Lifestyle    Physical activity     Days per week: Not on file     Minutes per session: Not on file    Stress: Not on file   Relationships    Social connections     Talks on phone: Not on file     Gets together: Not on file     Attends [employer] service: Not on file     Active member of club or organization: Not on file     Attends meetings of clubs or organizations: Not on file     Relationship status: Not on file    Intimate partner violence     Fear of current or ex partner: Not on file     Emotionally abused: Not on file     Physically abused: Not on file     Forced sexual activity: Not on file   Other Topics Concern    Not on file   Social History Narrative    Not on file       Family History   Problem Relation Age of Onset    Depression Mother     Hypertension Mother     Diabetes Mother     Hypertension Maternal Grandmother     Diabetes Maternal Grandmother     Cancer Maternal Grandfather         unknown    Hypertension Sister     Breast Cancer Paternal Grandmother         [de-identified]    Colon Cancer Neg Hx        Allergies:  Claritin [loratadine]; Guaifenesin er; Loratadine; Remicade [infliximab]; and Robitussin (alcohol free) [guaifenesin]    Home Medications:  Prior to Admission medications    Medication Sig Start Date End Date Taking? Authorizing Provider   tamsulosin (FLOMAX) 0.4 MG capsule Take 1 capsule by mouth daily for 5 doses 7/27/20 8/1/20 Yes Heber Bustos DO   ibuprofen (ADVIL;MOTRIN) 800 MG tablet Take 1 tablet by mouth 2 times daily as needed for Pain 7/27/20  Yes Heber Bustos DO   oxyCODONE-acetaminophen (PERCOCET) 5-325 MG per tablet Take 1 tablet by mouth every 6 hours as needed for Pain for up to 3 days. Intended supply: 3 days.  Take lowest dose possible to manage pain 7/27/20 7/30/20 Yes Heber Bustos DO   polyethylene glycol [facility]) powder Use as directed by following your patient instructions given by office. 2/24/20   Jailene Bauer MD   bisacodyl (DULCOLAX) 5 MG EC tablet TAKE 4 TABS AT 10 AM THE DAY PRIOR TO COLONOSCOPY 2/24/20   Jailene Bauer MD       REVIEW OF SYSTEMS    (2-9 systems for level 4, 10 or more for level 5)      Review of Systems   Constitutional: Positive for fatigue. Negative for appetite change and fever. HENT: Negative for postnasal drip and trouble swallowing. Respiratory: Negative for shortness of breath and wheezing. Gastrointestinal: Positive for abdominal pain. Endocrine: Negative for polyuria. Genitourinary: Positive for pelvic pain. Negative for dysuria, hematuria, vaginal bleeding and vaginal discharge. Musculoskeletal: Negative for back pain, neck pain and neck stiffness. Skin: Negative for rash and wound. Neurological: Negative for weakness and numbness. Psychiatric/Behavioral: Negative for confusion. PHYSICAL EXAM   (up to 7 for level 4, 8 or more for level 5)      INITIAL VITALS:   /74   Pulse 62   Resp 16   Ht 5' 3\" (1.6 m)   Wt 200 lb (90.7 kg)   LMP  (Within Weeks)   SpO2 97%   BMI 35.43 kg/m²     Physical Exam  Constitutional:       Appearance: She is normal weight. She is not toxic-appearing. HENT:      Head: Normocephalic and atraumatic. Eyes:      General:         Right eye: No discharge. Left eye: No discharge. Cardiovascular:      Rate and Rhythm: Normal rate. Pulmonary:      Effort: Pulmonary effort is normal.   Abdominal:      General: There is no distension. Palpations: There is no mass. Tenderness: There is abdominal tenderness (LLQ). There is no right CVA tenderness, left CVA tenderness, guarding or rebound. Hernia: No hernia is present. Genitourinary:     Vagina: No foreign body. Vaginal discharge present. No erythema, tenderness, bleeding, lesions or prolapsed vaginal walls.       Cervix: No discharge, friability, lesion, erythema, cervical bleeding or eversion. Adnexa:         Right: No mass, tenderness or fullness. Left: Tenderness present. No mass or fullness. Musculoskeletal:      Right lower leg: No edema. Left lower leg: No edema. Neurological:      Mental Status: She is alert. Motor: No weakness. Gait: Gait normal.         DIFFERENTIAL  DIAGNOSIS     PLAN (LABS / IMAGING / EKG):  Orders Placed This Encounter   Procedures    VAGINITIS DNA PROBE    C.trachomatis N.gonorrhoeae DNA    US NON OB TRANSVAGINAL    US DUP ABD PEL RETRO SCROT LIMITED    CT ABDOMEN PELVIS W IV CONTRAST Additional Contrast? None    CBC Auto Differential    Comprehensive Metabolic Panel w/ Reflex to MG    Lipase    HCG Qualitative, Serum    Urinalysis Reflex to Culture    Microscopic Urinalysis    Vaginal exam    Catheterization for spec collection       MEDICATIONS ORDERED:  Orders Placed This Encounter   Medications    lactated ringers infusion 1,000 mL    fentaNYL (SUBLIMAZE) injection 50 mcg    ondansetron (ZOFRAN) injection 4 mg    fentaNYL (SUBLIMAZE) injection 50 mcg    iohexol (OMNIPAQUE 350) solution 75 mL    ketorolac (TORADOL) injection 30 mg    tamsulosin (FLOMAX) 0.4 MG capsule     Sig: Take 1 capsule by mouth daily for 5 doses     Dispense:  5 capsule     Refill:  0    ibuprofen (ADVIL;MOTRIN) 800 MG tablet     Sig: Take 1 tablet by mouth 2 times daily as needed for Pain     Dispense:  60 tablet     Refill:  0    oxyCODONE-acetaminophen (PERCOCET) 5-325 MG per tablet     Sig: Take 1 tablet by mouth every 6 hours as needed for Pain for up to 3 days. Intended supply: 3 days.  Take lowest dose possible to manage pain     Dispense:  10 tablet     Refill:  0       DDX: Diverticulitis, ovarian torsion, ovarian cyst, colitis,    DIAGNOSTIC RESULTS / EMERGENCY DEPARTMENT COURSE / MDM   LAB RESULTS:  Results for orders placed or performed during the hospital encounter of 07/27/20   VAGINITIS DNA PROBE    Specimen: Vaginal   Result Value Ref Range    Specimen Description . VAGINA     Special Requests NOT REPORTED     Direct Exam NEGATIVE for Candida sp. Direct Exam NEGATIVE for Trichomonas vaginalis     Direct Exam NEGATIVE for Gardnerella vaginalis     Direct Exam       Method of testing is a DNA probe intended for detection and identification of Candida species, Gardnerella vaginalis, and Trichomonas vaginalis nucleic acid in vaginal fluid specimens from patients with symptoms of vaginitis/vaginosis.    CBC Auto Differential   Result Value Ref Range    WBC 9.9 3.5 - 11.3 k/uL    RBC 4.22 3.95 - 5.11 m/uL    Hemoglobin 13.6 11.9 - 15.1 g/dL    Hematocrit 40.4 36.3 - 47.1 %    MCV 95.7 82.6 - 102.9 fL    MCH 32.2 25.2 - 33.5 pg    MCHC 33.7 28.4 - 34.8 g/dL    RDW 13.2 11.8 - 14.4 %    Platelets 638 997 - 762 k/uL    MPV 10.8 8.1 - 13.5 fL    NRBC Automated 0.0 0.0 per 100 WBC    Differential Type NOT REPORTED     Seg Neutrophils 66 (H) 36 - 65 %    Lymphocytes 24 24 - 43 %    Monocytes 7 3 - 12 %    Eosinophils % 2 1 - 4 %    Basophils 1 0 - 2 %    Immature Granulocytes 0 0 %    Segs Absolute 6.55 1.50 - 8.10 k/uL    Absolute Lymph # 2.38 1.10 - 3.70 k/uL    Absolute Mono # 0.66 0.10 - 1.20 k/uL    Absolute Eos # 0.20 0.00 - 0.44 k/uL    Basophils Absolute 0.07 0.00 - 0.20 k/uL    Absolute Immature Granulocyte <0.03 0.00 - 0.30 k/uL    WBC Morphology NOT REPORTED     RBC Morphology NOT REPORTED     Platelet Estimate NOT REPORTED    Comprehensive Metabolic Panel w/ Reflex to MG   Result Value Ref Range    Glucose 106 (H) 70 - 99 mg/dL    BUN 9 6 - 20 mg/dL    CREATININE 0.62 0.50 - 0.90 mg/dL    Bun/Cre Ratio NOT REPORTED 9 - 20    Calcium 8.8 8.6 - 10.4 mg/dL    Sodium 139 135 - 144 mmol/L    Potassium 4.0 3.7 - 5.3 mmol/L    Chloride 105 98 - 107 mmol/L    CO2 23 20 - 31 mmol/L    Anion Gap 11 9 - 17 mmol/L    Alkaline Phosphatase 79 35 - 104 U/L     (H) 5 - 33 U/L    AST 111 (H) <32 U/L    Total Bilirubin 0.68 0.3 - 1.2 mg/dL    Total Protein 6.9 6.4 - 8.3 g/dL    Alb 3.9 3.5 - 5.2 g/dL    Albumin/Globulin Ratio 1.3 1.0 - 2.5    GFR Non-African American >60 >60 mL/min    GFR African American >60 >60 mL/min    GFR Comment          GFR Staging NOT REPORTED    Lipase   Result Value Ref Range    Lipase 22 13 - 60 U/L   HCG Qualitative, Serum   Result Value Ref Range    hCG Qual NEGATIVE NEGATIVE   Urinalysis Reflex to Culture    Specimen: Urine, clean catch   Result Value Ref Range    Color, UA ORANGE (A) YELLOW    Turbidity UA CLOUDY (A) CLEAR    Glucose, Ur NEGATIVE NEGATIVE    Bilirubin Urine NEGATIVE NEGATIVE    Ketones, Urine NEGATIVE NEGATIVE    Specific Gravity, UA 1.061 (H) 1.005 - 1.030    Urine Hgb LARGE (A) NEGATIVE    pH, UA 5.5 5.0 - 8.0    Protein, UA TRACE (A) NEGATIVE    Urobilinogen, Urine Normal Normal    Nitrite, Urine NEGATIVE NEGATIVE    Leukocyte Esterase, Urine TRACE (A) NEGATIVE    Urinalysis Comments NOT REPORTED    Microscopic Urinalysis   Result Value Ref Range    -          WBC, UA 5 TO 10 0 - 5 /HPF    RBC, UA TOO NUMEROUS TO COUNT 0 - 4 /HPF    Casts UA NOT REPORTED 0 - 8 /LPF    Crystals, UA NOT REPORTED None /HPF    Epithelial Cells UA 0 TO 2 0 - 5 /HPF    Renal Epithelial, UA NOT REPORTED 0 /HPF    Bacteria, UA NOT REPORTED None    Mucus, UA NOT REPORTED None    Trichomonas, UA NOT REPORTED None    Amorphous, UA NOT REPORTED None    Other Observations UA NOT REPORTED NOT REQ. Yeast, UA NOT REPORTED None       IMPRESSION: Likely ovarian cyst, cannot rule out ovarian torsion at this time    RADIOLOGY:  Us Non Ob Transvaginal    Result Date: 7/27/2020  EXAMINATION: PELVIC ULTRASOUND; DOPPLER EVALUATION 7/27/2020 TECHNIQUE: Transvaginal pelvic ultrasound was performed.; DOPPLER ULTRASOUND OF THE PELVIS  Color Doppler evaluation was performed.  COMPARISON: None HISTORY: ORDERING SYSTEM PROVIDED HISTORY: Left adnexal tenderness, r/o torsion FINDINGS: Measurements: Uterus:  9.3 x 5.0 x 6.4 cm Endometrial stripe:  7 mm Right Ovary:  2.7 x 2.8 x 2.3 cm Left Ovary:  6.5 x 6.4 x 6.2 cm Ultrasound Findings: Uterus: Uterus demonstrates normal myometrial echotexture. Endometrial stripe: Endometrial stripe is within normal limits. Right Ovary: Right ovary is within normal limits. There is normal arterial and venous doppler flow. Left Ovary:  Left ovary 5.5 cm simple appearing cyst.  There is normal arterial and venous doppler flow. Free Fluid: No evidence of free fluid. Enlarged left ovarian simple appearing cyst, please see follow-up guidelines below. Otherwise negative pelvic ultrasound. Normal Doppler flow within the ovaries. RECOMMENDATIONS: 5.5 cm simple ovarian cyst. Recommend follow-up pelvic US annually. Reference: Radiology 2010 Sep;256(3):038-29     Ct Abdomen Pelvis W Iv Contrast Additional Contrast? None    Result Date: 7/27/2020  EXAMINATION: CT OF THE ABDOMEN AND PELVIS WITH CONTRAST 7/27/2020 5:13 pm TECHNIQUE: CT of the abdomen and pelvis was performed with the administration of intravenous contrast. Multiplanar reformatted images are provided for review. Dose modulation, iterative reconstruction, and/or weight based adjustment of the mA/kV was utilized to reduce the radiation dose to as low as reasonably achievable. COMPARISON: Ultrasound July 27, 2020 HISTORY: ORDERING SYSTEM PROVIDED HISTORY: Sudden onset LLQ abdominal pain with nausea and vomiting TECHNOLOGIST PROVIDED HISTORY: Sudden onset LLQ abdominal pain with nausea and vomiting Reason for Exam: sudden onset LLQ abdominal pain with nausea and vomiting Acuity: Acute Type of Exam: Initial FINDINGS: Lower Chest: Lung bases are clear. Organs: Decreased attenuation of the liver is consistent with hepatic steatosis. No focal liver lesion. Cholecystectomy. No pancreatic lesion. No splenomegaly. No adrenal lesion. No right-sided hydronephrosis. No right renal calculus.   Delayed left nephrogram lower quadrant. Pelvic exam patient had left adnexal tenderness but no fullness or obvious masses. Concern for ovarian torsion. Transvaginal ultrasound ordered. ultrasound revealed left ovarian cyst but no evidence of torsion. On re-evaluation patient states that she feels some better after fluids, analgesics and antiemetics. CT abdomen showed left-sided obstructing ureteral stone. No obvious UTI on urinalysis. Patient discharged home with instructions to strain her urine. Given prescription for Flomax. Given prescription for Percocet for analgesia until stone passes. Given strict instructions to follow-up with urology if she is unable to pass a stone or if her symptoms worsen. Patient informed that she may return to the emergency department if she is unable to obtain follow-up or if she has any worsening of her symptoms. She verbalized understanding and agreement with ED return precautions and discharge plan    PROCEDURES:  None    CONSULTS:  None    CRITICAL CARE:  Please see attending note    FINAL IMPRESSION      1. Ureterolithiasis          DISPOSITION / PLAN     DISPOSITION Decision To Discharge 07/27/2020 06:59:16 PM      PATIENT REFERRED TO:  [address]   [address] [facility]  [phone]    Schedule an appointment as soon as possible for a visit in 3 days  For re-evaluation if symptoms have not improved    [address] [place] [address]  [phone]  Schedule an appointment as soon as possible for a visit   Follow-up with urology clinic if stone has not passed within 48 hours      DISCHARGE MEDICATIONS:  Discharge Medication List as of 7/27/2020  7:03 PM      START taking these medications    Details   tamsulosin (FLOMAX) 0.4 MG capsule Take 1 capsule by mouth daily for 5 doses, Disp-5 capsule,R-0Print      ibuprofen (ADVIL;MOTRIN) 800 MG tablet Take 1 tablet by mouth 2 times daily as needed for Pain, Disp-60 tablet,R-0Print      oxyCODONE-acetaminophen (PERCOCET) 5-325 MG per tablet Take 1 tablet by mouth every 6 hours as needed for Pain for up to 3 days. Intended supply: 3 days.  Take lowest dose possible to manage pain, Disp-10 tablet,R-0Print             Yamileth Matthews DO  Emergency Medicine Resident    (Please note that portions of thisnote were completed with a voice recognition program.  Efforts were made to edit the dictations but occasionally words are mis-transcribed.)        Yamileth Matthews DO  Resident  07/27/20 [id number]

## 2020-07-27 NOTE — ED NOTES
Patient presents to ED for evaluation of abdominal pain. Patient reports abdominal pain x2 hours beginning while she was driving. Patient reports associated nausea and vomiting. The pain is in her LLQ abdomen. Patient denies diarrhea, blood in stool or emesis, vaginal bleeding or discharge, urinary symptoms, fever, chills, cough, chest pain, shortness of breath. Patient has not taken anything for the pain.  Patient has hx crohn's disease and ovarian cyst in the past.     Sol Banks RN  07/27/20 [id number]

## 2020-07-27 NOTE — ED PROVIDER NOTES
[facility] [address] [facility]     Emergency Department     Faculty Attestation    I performed a history and physical examination of the patient and discussed management with the resident. I reviewed the residents note and agree with the documented findings and plan of care. Any areas of disagreement are noted on the chart. I was personally present for the key portions of any procedures. I have documented in the chart those procedures where I was not present during the key portions. I have reviewed the emergency nurses triage note. I agree with the chief complaint, past medical history, past surgical history, allergies, medications, social and family history as documented unless otherwise noted below. For Physician Assistant/ Nurse Practitioner cases/documentation I have personally evaluated this patient and have completed at least one if not all key elements of the E/M (history, physical exam, and MDM). Additional findings are as noted. This patient was evaluated in the Emergency Department for symptoms described in the history of present illness. He/she was evaluated in the context of the global COVID-19 pandemic, which necessitated consideration that the patient might be at risk for infection with the SARS-CoV-2 virus that causes COVID-19. Institutional protocols and algorithms that pertain to the evaluation of patients at risk for COVID-19 are in a state of rapid change based on information released by regulatory bodies including the CDC and federal and state organizations. These policies and algorithms were followed during the patient's care in the ED. Patient with sudden onset left lower left flank abdominal pain. Began this morning. History of Crohn's extensive surgical history. Also history left ovarian cyst no history of kidney stones. Vomiting as well no fevers no diarrhea no urinary complaints. LMP was \"2 to 3 weeks ago. \"  On exam patient peers uncomfortable holding her left lower quadrant had just actively vomited. Focal left lower tenderness CVA tenderness as well but no rebound no guarding.   Will proceed with pain control labs, urine, pregnancy test, pelvic exam, plan for imaging, to be determined by pelvic exam whether to begin with ultrasound or CT      Critical Care     none    Vi Guerra MD, Judith Wells  Attending Emergency  Physician             Vi Guerra MD  07/27/20 [id number]

## 2020-07-27 NOTE — ED PROVIDER NOTES
[address] HANDOFF       Handoff taken on the following patient from prior Attending Physician:  Pt Name: Noah Muro  PCP:  Katherine Rush PA-C    Attestation  I was available and discussed any additional care issues that arose and coordinated the management plans with the resident(s) caring for the patient during my duty period. Any areas of disagreement with resident's documentation of care or procedures are noted on the chart. I was personally present for the key portions of any/all procedures during my duty period. I have documented in the chart those procedures where I was not present during the key portions.              Naeem Shetty MD  07/27/20 [id number]

## 2020-07-27 NOTE — ED NOTES
Pt resting on stretcher, no respiratory distress noted, pt updated on plan of care, will continue to monitor, call light in reach.        Aide Beckwith, MAURILIO  07/27/20 [id number]

## 2020-07-27 NOTE — ED NOTES
Bed: 30  Expected date:   Expected time:   Means of arrival:   Comments:  LIA Rossi RN  07/27/20 [id number]

## 2020-07-27 NOTE — ED NOTES
Pt. Resting on stretcher in room 30. Pt. . updated on plan of care. Call light within reach and bed in lowest position. Will continue to monitior.       Pamella Sanchez RN  07/27/20 7323

## 2020-08-03 ENCOUNTER — HOSPITAL ENCOUNTER (OUTPATIENT)
Dept: GENERAL RADIOLOGY | Age: 35
Discharge: HOME | End: 2020-08-05
Payer: COMMERCIAL

## 2020-08-03 ENCOUNTER — HOSPITAL ENCOUNTER (OUTPATIENT)
Age: 35
Discharge: HOME | End: 2020-08-05
Payer: COMMERCIAL

## 2020-08-03 ENCOUNTER — HOSPITAL ENCOUNTER (OUTPATIENT)
Age: 35
Discharge: HOME | End: 2020-08-03
Payer: COMMERCIAL

## 2020-08-03 LAB
ALBUMIN SERPL-MCNC: 4.1 G/DL (ref 3.5–5.2)
ALBUMIN/GLOBULIN RATIO: 1.2 (ref 1–2.5)
ALP BLD-CCNC: 78 U/L (ref 35–104)
ALT SERPL-CCNC: 92 U/L (ref 5–33)
ANION GAP SERPL CALCULATED.3IONS-SCNC: 13 MMOL/L (ref 9–17)
AST SERPL-CCNC: 75 U/L
BILIRUB SERPL-MCNC: 0.36 MG/DL (ref 0.3–1.2)
BILIRUBIN DIRECT: 0.1 MG/DL
BILIRUBIN, INDIRECT: 0.26 MG/DL (ref 0–1)
BUN BLDV-MCNC: 14 MG/DL (ref 6–20)
CALCIUM SERPL-MCNC: 9.8 MG/DL (ref 8.6–10.4)
CHLORIDE BLD-SCNC: 103 MMOL/L (ref 98–107)
CHOLESTEROL/HDL RATIO: 4.5
CHOLESTEROL: 219 MG/DL
CO2: 25 MMOL/L (ref 20–31)
CREAT SERPL-MCNC: 0.79 MG/DL (ref 0.5–0.9)
ESTIMATED AVERAGE GLUCOSE: 94 MG/DL
GFR AFRICAN AMERICAN: >60 ML/MIN
GFR NON-AFRICAN AMERICAN: >60 ML/MIN
GFR SERPL CREATININE-BSD FRML MDRD: NORMAL ML/MIN/{1.73_M2}
GFR SERPL CREATININE-BSD FRML MDRD: NORMAL ML/MIN/{1.73_M2}
GLOBULIN: ABNORMAL G/DL (ref 1.5–3.8)
GLUCOSE BLD-MCNC: 89 MG/DL (ref 70–99)
HBA1C MFR BLD: 4.9 % (ref 4–6)
HCT VFR BLD CALC: 43.7 % (ref 36.3–47.1)
HDLC SERPL-MCNC: 49 MG/DL
HEMOGLOBIN: 14.6 G/DL (ref 11.9–15.1)
IRON: 99 UG/DL (ref 37–145)
LDL CHOLESTEROL: 117 MG/DL (ref 0–130)
MCH RBC QN AUTO: 32.4 PG (ref 25.2–33.5)
MCHC RBC AUTO-ENTMCNC: 33.4 G/DL (ref 28.4–34.8)
MCV RBC AUTO: 96.9 FL (ref 82.6–102.9)
NRBC AUTOMATED: 0 PER 100 WBC
PDW BLD-RTO: 13.1 % (ref 11.8–14.4)
PLATELET # BLD: 316 K/UL (ref 138–453)
PMV BLD AUTO: 10.2 FL (ref 8.1–13.5)
POTASSIUM SERPL-SCNC: 4.3 MMOL/L (ref 3.7–5.3)
RBC # BLD: 4.51 M/UL (ref 3.95–5.11)
SODIUM BLD-SCNC: 141 MMOL/L (ref 135–144)
TOTAL PROTEIN: 7.4 G/DL (ref 6.4–8.3)
TRIGL SERPL-MCNC: 264 MG/DL
TSH SERPL DL<=0.05 MIU/L-ACNC: 1.84 MIU/L (ref 0.3–5)
VITAMIN B-12: 334 PG/ML (ref 232–1245)
VITAMIN D 25-HYDROXY: 19.7 NG/ML (ref 30–100)
VLDLC SERPL CALC-MCNC: ABNORMAL MG/DL (ref 1–30)
WBC # BLD: 8.4 K/UL (ref 3.5–11.3)

## 2020-08-03 PROCEDURE — 80061 LIPID PANEL: CPT

## 2020-08-03 PROCEDURE — 74018 RADEX ABDOMEN 1 VIEW: CPT

## 2020-08-03 PROCEDURE — 80051 ELECTROLYTE PANEL: CPT

## 2020-08-03 PROCEDURE — 84520 ASSAY OF UREA NITROGEN: CPT

## 2020-08-03 PROCEDURE — 83540 ASSAY OF IRON: CPT

## 2020-08-03 PROCEDURE — 83036 HEMOGLOBIN GLYCOSYLATED A1C: CPT

## 2020-08-03 PROCEDURE — 82310 ASSAY OF CALCIUM: CPT

## 2020-08-03 PROCEDURE — 85027 COMPLETE CBC AUTOMATED: CPT

## 2020-08-03 PROCEDURE — 84443 ASSAY THYROID STIM HORMONE: CPT

## 2020-08-03 PROCEDURE — 82306 VITAMIN D 25 HYDROXY: CPT

## 2020-08-03 PROCEDURE — 80076 HEPATIC FUNCTION PANEL: CPT

## 2020-08-03 PROCEDURE — 82607 VITAMIN B-12: CPT

## 2020-08-03 PROCEDURE — 82947 ASSAY GLUCOSE BLOOD QUANT: CPT

## 2020-08-03 PROCEDURE — 82565 ASSAY OF CREATININE: CPT

## 2020-09-04 ENCOUNTER — HOSPITAL ENCOUNTER (OUTPATIENT)
Dept: CT IMAGING | Age: 35
Discharge: HOME | End: 2020-09-06
Payer: COMMERCIAL

## 2020-09-04 PROCEDURE — 74176 CT ABD & PELVIS W/O CONTRAST: CPT

## 2020-10-27 ENCOUNTER — HOSPITAL ENCOUNTER (OUTPATIENT)
Age: 35
Setting detail: SPECIMEN
Discharge: HOME | End: 2020-10-27
Payer: COMMERCIAL

## 2020-10-27 ENCOUNTER — OFFICE VISIT (OUTPATIENT)
Dept: OBGYN CLINIC | Age: 35
End: 2020-10-27
Payer: COMMERCIAL

## 2020-10-27 VITALS
HEART RATE: 72 BPM | TEMPERATURE: 97.4 F | DIASTOLIC BLOOD PRESSURE: 88 MMHG | SYSTOLIC BLOOD PRESSURE: 134 MMHG | BODY MASS INDEX: 35.1 KG/M2 | WEIGHT: 205.6 LBS | HEIGHT: 64 IN

## 2020-10-27 LAB
DIRECT EXAM: NORMAL
HIV AG/AB: NONREACTIVE
Lab: NORMAL
SPECIMEN DESCRIPTION: NORMAL
T. PALLIDUM, IGG: NONREACTIVE

## 2020-10-27 PROCEDURE — G8484 FLU IMMUNIZE NO ADMIN: HCPCS | Performed by: ADVANCED PRACTICE MIDWIFE

## 2020-10-27 PROCEDURE — 36415 COLL VENOUS BLD VENIPUNCTURE: CPT | Performed by: ADVANCED PRACTICE MIDWIFE

## 2020-10-27 PROCEDURE — G0101 CA SCREEN;PELVIC/BREAST EXAM: HCPCS | Performed by: ADVANCED PRACTICE MIDWIFE

## 2020-10-27 RX ORDER — ERGOCALCIFEROL 1.25 MG/1
CAPSULE ORAL
COMMUNITY
Start: 2020-09-28 | End: 2021-05-10

## 2020-10-27 RX ORDER — THIAMINE MONONITRATE, RIBOFLAVIN, PYRIDOXINE HYDROCHLORIDE, CYANOCOBALAMIN, ASCORBIC ACID, NIACIN, FOLIC ACID, FERROUS FUMARATE, CALCIUM CARBONATE, CHOLECALCIFEROL, VITAMIN E ACETATE, POTASSIUM IODIDE, ZINC OXIDE, CHOLINE BITARTRATE, AND DOCONEXENT
1 KIT DAILY
Qty: 30 EACH | Refills: 12 | Status: SHIPPED | OUTPATIENT
Start: 2020-10-27 | End: 2021-05-10
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
## 2020-11-02 ASSESSMENT — ENCOUNTER SYMPTOMS
RESPIRATORY NEGATIVE: 1
EYES NEGATIVE: 1
ALLERGIC/IMMUNOLOGIC NEGATIVE: 1
GASTROINTESTINAL NEGATIVE: 1

## 2020-11-13 ENCOUNTER — HOSPITAL ENCOUNTER (OUTPATIENT)
Dept: MRI IMAGING | Age: 35
Discharge: HOME | End: 2020-11-15
Payer: COMMERCIAL

## 2020-11-13 ENCOUNTER — HOSPITAL ENCOUNTER (OUTPATIENT)
Age: 35
Discharge: HOME | End: 2020-11-13
Payer: COMMERCIAL

## 2020-11-13 ENCOUNTER — HOSPITAL ENCOUNTER (OUTPATIENT)
Dept: GENERAL RADIOLOGY | Age: 35
Discharge: HOME | End: 2020-11-15
Payer: COMMERCIAL

## 2020-11-13 ENCOUNTER — HOSPITAL ENCOUNTER (OUTPATIENT)
Age: 35
Discharge: HOME | End: 2020-11-15
Payer: COMMERCIAL

## 2020-11-13 LAB
ALBUMIN SERPL-MCNC: 4.2 G/DL (ref 3.5–5.2)
ALBUMIN/GLOBULIN RATIO: 1.4 (ref 1–2.5)
ALP BLD-CCNC: 82 U/L (ref 35–104)
ALT SERPL-CCNC: 133 U/L (ref 5–33)
ANION GAP SERPL CALCULATED.3IONS-SCNC: 13 MMOL/L (ref 9–17)
AST SERPL-CCNC: 77 U/L
BILIRUB SERPL-MCNC: 0.53 MG/DL (ref 0.3–1.2)
BILIRUBIN DIRECT: 0.14 MG/DL
BILIRUBIN, INDIRECT: 0.39 MG/DL (ref 0–1)
BUN BLDV-MCNC: 10 MG/DL (ref 6–20)
CALCIUM SERPL-MCNC: 9.1 MG/DL (ref 8.6–10.4)
CHLORIDE BLD-SCNC: 106 MMOL/L (ref 98–107)
CHOLESTEROL, FASTING: 224 MG/DL
CHOLESTEROL/HDL RATIO: 4.8
CO2: 21 MMOL/L (ref 20–31)
CREAT SERPL-MCNC: 0.64 MG/DL (ref 0.5–0.9)
ESTIMATED AVERAGE GLUCOSE: 94 MG/DL
GFR AFRICAN AMERICAN: >60 ML/MIN
GFR NON-AFRICAN AMERICAN: >60 ML/MIN
GFR SERPL CREATININE-BSD FRML MDRD: NORMAL ML/MIN/{1.73_M2}
GFR SERPL CREATININE-BSD FRML MDRD: NORMAL ML/MIN/{1.73_M2}
GLOBULIN: ABNORMAL G/DL (ref 1.5–3.8)
GLUCOSE BLD-MCNC: 80 MG/DL (ref 70–99)
HBA1C MFR BLD: 4.9 % (ref 4–6)
HCT VFR BLD CALC: 43.2 % (ref 36.3–47.1)
HDLC SERPL-MCNC: 47 MG/DL
HEMOGLOBIN: 14.3 G/DL (ref 11.9–15.1)
IRON: 126 UG/DL (ref 37–145)
LDL CHOLESTEROL: 130 MG/DL (ref 0–130)
MCH RBC QN AUTO: 32.1 PG (ref 25.2–33.5)
MCHC RBC AUTO-ENTMCNC: 33.1 G/DL (ref 28.4–34.8)
MCV RBC AUTO: 97.1 FL (ref 82.6–102.9)
NRBC AUTOMATED: 0 PER 100 WBC
PDW BLD-RTO: 12.8 % (ref 11.8–14.4)
PLATELET # BLD: 265 K/UL (ref 138–453)
PMV BLD AUTO: 10.9 FL (ref 8.1–13.5)
POTASSIUM SERPL-SCNC: 3.7 MMOL/L (ref 3.7–5.3)
RBC # BLD: 4.45 M/UL (ref 3.95–5.11)
SODIUM BLD-SCNC: 140 MMOL/L (ref 135–144)
TOTAL PROTEIN: 7.1 G/DL (ref 6.4–8.3)
TRIGLYCERIDE, FASTING: 234 MG/DL
VITAMIN B-12: 404 PG/ML (ref 232–1245)
VITAMIN D 25-HYDROXY: 20.9 NG/ML (ref 30–100)
VLDLC SERPL CALC-MCNC: ABNORMAL MG/DL (ref 1–30)
WBC # BLD: 6.4 K/UL (ref 3.5–11.3)

## 2020-11-13 PROCEDURE — 80061 LIPID PANEL: CPT

## 2020-11-13 PROCEDURE — 82306 VITAMIN D 25 HYDROXY: CPT

## 2020-11-13 PROCEDURE — 6360000004 HC RX CONTRAST MEDICATION: Performed by: ADVANCED PRACTICE MIDWIFE

## 2020-11-13 PROCEDURE — 80076 HEPATIC FUNCTION PANEL: CPT

## 2020-11-13 PROCEDURE — 84520 ASSAY OF UREA NITROGEN: CPT

## 2020-11-13 PROCEDURE — 85027 COMPLETE CBC AUTOMATED: CPT

## 2020-11-13 PROCEDURE — 82947 ASSAY GLUCOSE BLOOD QUANT: CPT

## 2020-11-13 PROCEDURE — 72197 MRI PELVIS W/O & W/DYE: CPT

## 2020-11-13 PROCEDURE — A9576 INJ PROHANCE MULTIPACK: HCPCS | Performed by: ADVANCED PRACTICE MIDWIFE

## 2020-11-13 PROCEDURE — 80051 ELECTROLYTE PANEL: CPT

## 2020-11-13 PROCEDURE — 83036 HEMOGLOBIN GLYCOSYLATED A1C: CPT

## 2020-11-13 PROCEDURE — 82310 ASSAY OF CALCIUM: CPT

## 2020-11-13 PROCEDURE — 82565 ASSAY OF CREATININE: CPT

## 2020-11-13 PROCEDURE — 72220 X-RAY EXAM SACRUM TAILBONE: CPT

## 2020-11-13 PROCEDURE — 72100 X-RAY EXAM L-S SPINE 2/3 VWS: CPT

## 2020-11-13 PROCEDURE — 83540 ASSAY OF IRON: CPT

## 2020-11-13 PROCEDURE — 36415 COLL VENOUS BLD VENIPUNCTURE: CPT

## 2020-11-13 PROCEDURE — 82607 VITAMIN B-12: CPT

## 2020-11-13 RX ORDER — SODIUM CHLORIDE 0.9 % (FLUSH) 0.9 %
30 SYRINGE (ML) INJECTION ONCE
Status: DISCONTINUED | OUTPATIENT
Start: 2020-11-13 | End: 2020-11-16 | Stop reason: HOSPADM

## 2020-11-13 RX ADMIN — GADOTERIDOL 18 ML: 279.3 INJECTION, SOLUTION INTRAVENOUS at 08:11

## 2020-11-17 ENCOUNTER — TELEPHONE (OUTPATIENT)
Dept: OBGYN CLINIC | Age: 35
End: 2020-11-17

## 2020-11-17 NOTE — TELEPHONE ENCOUNTER
Called and reviewed MRI results with patient. Plan will be repeat MRI in 3 months.  Pt states understanding all questions answered

## 2021-03-11 ENCOUNTER — HOSPITAL ENCOUNTER (OUTPATIENT)
Dept: MRI IMAGING | Age: 36
Discharge: HOME | End: 2021-03-13
Payer: COMMERCIAL

## 2021-03-11 VITALS — BODY MASS INDEX: 36.5 KG/M2 | WEIGHT: 206 LBS | HEIGHT: 63 IN

## 2021-03-11 DIAGNOSIS — N83.202 CYST OF LEFT OVARY: ICD-10-CM

## 2021-03-11 PROCEDURE — 6360000004 HC RX CONTRAST MEDICATION: Performed by: ADVANCED PRACTICE MIDWIFE

## 2021-03-11 PROCEDURE — 72197 MRI PELVIS W/O & W/DYE: CPT

## 2021-03-11 PROCEDURE — 2580000003 HC RX 258: Performed by: ADVANCED PRACTICE MIDWIFE

## 2021-03-11 PROCEDURE — A9579 GAD-BASE MR CONTRAST NOS,1ML: HCPCS | Performed by: ADVANCED PRACTICE MIDWIFE

## 2021-03-11 RX ORDER — SODIUM CHLORIDE 0.9 % (FLUSH) 0.9 %
10 SYRINGE (ML) INJECTION PRN
Status: DISCONTINUED | OUTPATIENT
Start: 2021-03-11 | End: 2021-03-14 | Stop reason: HOSPADM

## 2021-03-11 RX ADMIN — Medication 10 ML: at 08:46

## 2021-03-11 RX ADMIN — GADOTERIDOL 20 ML: 279.3 INJECTION, SOLUTION INTRAVENOUS at 08:46

## 2021-03-12 ENCOUNTER — TELEPHONE (OUTPATIENT)
Dept: OBGYN CLINIC | Age: 36
End: 2021-03-12

## 2021-03-12 DIAGNOSIS — R19.09 OTHER INTRA-ABDOMINAL AND PELVIC SWELLING, MASS AND LUMP: ICD-10-CM

## 2021-03-12 DIAGNOSIS — N83.8 OVARIAN MASS: Primary | ICD-10-CM

## 2021-03-12 NOTE — TELEPHONE ENCOUNTER
Called and spoke with patient regarding MRI results. Recommendations for repeat MRI in 3 months and Surgical consult. Message to Dr. Beryle Caprice regarding if she would like to see patient on send off to gyn/onc.    Discussed with patient will inform her of POC after discussing with Dr. Beryle Caprice

## 2021-03-12 NOTE — TELEPHONE ENCOUNTER
Called patient discussed Dr. Donna Yepez recommends tumor markers and f/u with W/S for eval and further discussion. Pt States understanding will get labs and schedule appt. Information given.

## 2021-03-12 NOTE — TELEPHONE ENCOUNTER
----- Message from Herber Parra MD sent at 3/12/2021  2:40 PM EST -----  Regarding: RE: ovarian cyst  No worries :)    She can come our way. I would have her do tumor markers to be sure like OVA1. We can talk to her about what she would like to do after she does that lab. Then we can go from there. Thanks   ----- Message -----  From: NOMAN Parkinson CNM  Sent: 3/12/2021   9:04 AM EST  To: Herber Parra MD  Subject: ovarian cyst                                     Good morning its me again :-)    Pt had follow up MRI for cysts, results are as follows     Interval enlargement of a complex cyst involving the left ovary currently  measuring up to 7.9 cm, previously 6.5 cm. Nonenhancing thin internal  septations are again noted. However, there is interval development of a T1  hyperintense nodule along the medial aspect of the cyst measuring up to 2.5  cm without convincing contrast enhancement on post-contrast sequences which  could represent a focal collection of hemorrhagic or proteinaceous material.  Findings could be further followed up with MRI pelvis at 3 months; however,  given interval increase in size surgical consultation can also be considered. Is this something you would like to handle as surgical or anyone in particular you would rather me send to for consult.     Thanks    kiki

## 2021-03-17 ENCOUNTER — HOSPITAL ENCOUNTER (OUTPATIENT)
Age: 36
Discharge: HOME | End: 2021-03-17
Payer: COMMERCIAL

## 2021-03-17 ENCOUNTER — TELEPHONE (OUTPATIENT)
Dept: OBGYN CLINIC | Age: 36
End: 2021-03-17

## 2021-03-17 DIAGNOSIS — N83.8 OVARIAN MASS: ICD-10-CM

## 2021-03-17 DIAGNOSIS — R19.09 OTHER INTRA-ABDOMINAL AND PELVIC SWELLING, MASS AND LUMP: ICD-10-CM

## 2021-03-17 LAB
ALBUMIN SERPL-MCNC: 4.3 G/DL (ref 3.5–5.2)
ALBUMIN/GLOBULIN RATIO: 1.3 (ref 1–2.5)
ALP BLD-CCNC: 92 U/L (ref 35–104)
ALT SERPL-CCNC: 124 U/L (ref 5–33)
ANION GAP SERPL CALCULATED.3IONS-SCNC: 12 MMOL/L (ref 9–17)
AST SERPL-CCNC: 71 U/L
BILIRUB SERPL-MCNC: 0.49 MG/DL (ref 0.3–1.2)
BUN BLDV-MCNC: 10 MG/DL (ref 6–20)
BUN/CREAT BLD: ABNORMAL (ref 9–20)
CA 125: 62 U/ML
CALCIUM SERPL-MCNC: 9.5 MG/DL (ref 8.6–10.4)
CARCINOEMBRYONIC ANTIGEN: 4.9 NG/ML
CHLORIDE BLD-SCNC: 105 MMOL/L (ref 98–107)
CHOLESTEROL, FASTING: 233 MG/DL
CHOLESTEROL/HDL RATIO: 4.6
CO2: 24 MMOL/L (ref 20–31)
CREAT SERPL-MCNC: 0.58 MG/DL (ref 0.5–0.9)
ESTIMATED AVERAGE GLUCOSE: 91 MG/DL
GFR AFRICAN AMERICAN: >60 ML/MIN
GFR NON-AFRICAN AMERICAN: >60 ML/MIN
GFR SERPL CREATININE-BSD FRML MDRD: ABNORMAL ML/MIN/{1.73_M2}
GFR SERPL CREATININE-BSD FRML MDRD: ABNORMAL ML/MIN/{1.73_M2}
GLUCOSE BLD-MCNC: 83 MG/DL (ref 70–99)
HBA1C MFR BLD: 4.8 % (ref 4–6)
HCT VFR BLD CALC: 43.1 % (ref 36.3–47.1)
HDLC SERPL-MCNC: 51 MG/DL
HEMOGLOBIN: 14.3 G/DL (ref 11.9–15.1)
IRON: 92 UG/DL (ref 37–145)
LDL CHOLESTEROL: 135 MG/DL (ref 0–130)
MCH RBC QN AUTO: 31.8 PG (ref 25.2–33.5)
MCHC RBC AUTO-ENTMCNC: 33.2 G/DL (ref 28.4–34.8)
MCV RBC AUTO: 96 FL (ref 82.6–102.9)
NRBC AUTOMATED: 0 PER 100 WBC
PDW BLD-RTO: 12.8 % (ref 11.8–14.4)
PLATELET # BLD: 299 K/UL (ref 138–453)
PMV BLD AUTO: 10.3 FL (ref 8.1–13.5)
POTASSIUM SERPL-SCNC: 3.5 MMOL/L (ref 3.7–5.3)
RBC # BLD: 4.49 M/UL (ref 3.95–5.11)
SODIUM BLD-SCNC: 141 MMOL/L (ref 135–144)
TOTAL PROTEIN: 7.6 G/DL (ref 6.4–8.3)
TRIGLYCERIDE, FASTING: 234 MG/DL
VITAMIN B-12: 347 PG/ML (ref 232–1245)
VITAMIN D 25-HYDROXY: 13.6 NG/ML (ref 30–100)
VLDLC SERPL CALC-MCNC: ABNORMAL MG/DL (ref 1–30)
WBC # BLD: 7.3 K/UL (ref 3.5–11.3)

## 2021-03-17 PROCEDURE — 80053 COMPREHEN METABOLIC PANEL: CPT

## 2021-03-17 PROCEDURE — 83036 HEMOGLOBIN GLYCOSYLATED A1C: CPT

## 2021-03-17 PROCEDURE — 82306 VITAMIN D 25 HYDROXY: CPT

## 2021-03-17 PROCEDURE — 36415 COLL VENOUS BLD VENIPUNCTURE: CPT

## 2021-03-17 PROCEDURE — 85027 COMPLETE CBC AUTOMATED: CPT

## 2021-03-17 PROCEDURE — 82607 VITAMIN B-12: CPT

## 2021-03-17 PROCEDURE — 80061 LIPID PANEL: CPT

## 2021-03-17 PROCEDURE — 83540 ASSAY OF IRON: CPT

## 2021-03-17 PROCEDURE — 86304 IMMUNOASSAY TUMOR CA 125: CPT

## 2021-03-17 PROCEDURE — 81503 ONCO (OVAR) FIVE PROTEINS: CPT

## 2021-03-17 PROCEDURE — 82378 CARCINOEMBRYONIC ANTIGEN: CPT

## 2021-03-17 NOTE — TELEPHONE ENCOUNTER
Called to review CEA and  results discussed awaiting OVA1 results before further recommendations can be given. She states understanding and all questions and concerns addressed.     Message to Dr. Grace Lawson sent regarding results and recommendations

## 2021-03-18 ENCOUNTER — TELEPHONE (OUTPATIENT)
Dept: OBGYN CLINIC | Age: 36
End: 2021-03-18

## 2021-03-18 NOTE — TELEPHONE ENCOUNTER
Returning call to [facility] and to alert them they have incorrect office. Jorge Paulson at [place] [id number] notified that the wrong office was contacted with this information for this pt.

## 2021-03-22 ENCOUNTER — TELEPHONE (OUTPATIENT)
Dept: OBGYN CLINIC | Age: 36
End: 2021-03-22

## 2021-03-22 LAB
MALIGNANCY RISK, OVA1, CA-125 II: 68
MALIGNANCY RISK, OVA1, MENOPAUSE STATUS: NORMAL
MALIGNANCY RISK, OVA1 EER: NORMAL
MALIGNANCY RISK, PELVIC MASS, OVA1 SCORE: 5.5
SCORE: 3.1

## 2021-03-24 ENCOUNTER — TELEPHONE (OUTPATIENT)
Dept: OBGYN CLINIC | Age: 36
End: 2021-03-24

## 2021-03-24 DIAGNOSIS — R97.0 ELEVATED CEA: ICD-10-CM

## 2021-03-24 DIAGNOSIS — R97.8 ELEVATED TUMOR MARKERS: ICD-10-CM

## 2021-03-24 DIAGNOSIS — N83.8 OVARIAN MASS: Primary | ICD-10-CM

## 2021-03-24 NOTE — TELEPHONE ENCOUNTER
Called and spoke with patient regarding elevated tumor markers and OVA1 results at increase risk. Also discussed pt with Dr. Katlyn Arias. Referral to Dr. Lizbeth Villagomez placed as urgent. Pt states understanding and all questions addressed.

## 2021-03-24 NOTE — TELEPHONE ENCOUNTER
----- Message from Milan Major MD sent at 3/24/2021 12:40 PM EDT -----  With the elevated risk and the changes in the ovary I would refer to Dr Mtz Later. Thanks for letting me know.

## 2021-04-02 ENCOUNTER — TELEPHONE (OUTPATIENT)
Dept: GYNECOLOGIC ONCOLOGY | Age: 36
End: 2021-04-02

## 2021-04-06 ENCOUNTER — PATIENT MESSAGE (OUTPATIENT)
Dept: OBGYN CLINIC | Age: 36
End: 2021-04-06

## 2021-04-06 ENCOUNTER — PREP FOR PROCEDURE (OUTPATIENT)
Dept: GYNECOLOGIC ONCOLOGY | Age: 36
End: 2021-04-06

## 2021-04-06 ENCOUNTER — OFFICE VISIT (OUTPATIENT)
Dept: GYNECOLOGIC ONCOLOGY | Age: 36
End: 2021-04-06
Payer: COMMERCIAL

## 2021-04-06 VITALS
BODY MASS INDEX: 35.61 KG/M2 | TEMPERATURE: 97.5 F | OXYGEN SATURATION: 97 % | HEIGHT: 64 IN | WEIGHT: 208.6 LBS | HEART RATE: 74 BPM

## 2021-04-06 DIAGNOSIS — N83.202 CYST OF LEFT OVARY: ICD-10-CM

## 2021-04-06 DIAGNOSIS — Z30.013 ENCOUNTER FOR INITIAL PRESCRIPTION OF INJECTABLE CONTRACEPTIVE: Primary | ICD-10-CM

## 2021-04-06 DIAGNOSIS — N83.8 OVARIAN MASS: ICD-10-CM

## 2021-04-06 DIAGNOSIS — Z01.818 PRE-OP EXAMINATION: Primary | ICD-10-CM

## 2021-04-06 DIAGNOSIS — K50.018 CROHN'S DISEASE OF SMALL INTESTINE WITH OTHER COMPLICATION (HCC): Primary | ICD-10-CM

## 2021-04-06 DIAGNOSIS — R87.610 ATYPICAL SQUAMOUS CELLS OF UNDETERMINED SIGNIFICANCE (ASCUS) ON PAPANICOLAOU SMEAR OF CERVIX: ICD-10-CM

## 2021-04-06 PROCEDURE — 99205 OFFICE O/P NEW HI 60 MIN: CPT | Performed by: OBSTETRICS & GYNECOLOGY

## 2021-04-06 PROCEDURE — G8428 CUR MEDS NOT DOCUMENT: HCPCS | Performed by: OBSTETRICS & GYNECOLOGY

## 2021-04-06 PROCEDURE — G8417 CALC BMI ABV UP PARAM F/U: HCPCS | Performed by: OBSTETRICS & GYNECOLOGY

## 2021-04-06 PROCEDURE — 1036F TOBACCO NON-USER: CPT | Performed by: OBSTETRICS & GYNECOLOGY

## 2021-04-06 RX ORDER — USTEKINUMAB 90 MG/ML
90 INJECTION, SOLUTION SUBCUTANEOUS
COMMUNITY

## 2021-04-06 ASSESSMENT — ENCOUNTER SYMPTOMS
ABDOMINAL PAIN: 1
BACK PAIN: 1
DIARRHEA: 1
ABDOMINAL DISTENTION: 1

## 2021-04-06 NOTE — PROGRESS NOTES
[address] [facility], [facility] [address]  [place] [address]      I am seeing Karlie Lee for New Patient at the request of HARLEY Sheehan. Chief Complaint: Left pelvic pain. Left ovarian cyst.    HPI  3, para 3 (all vaginal deliveries) she is a 30 y.o. female who is being referred for a persistent slightly enlarging, septated, left ovarian cyst.  Patient states that she first had left pelvic discomfort a little over a year ago. She saw Lacie Sheehan 36 and M. A pelvic ultrasound revealed a left ovarian cyst measuring 5.5 cm. The decision was made to repeat the ultrasound in 1 year. She continued to have pain off and on. A repeat pelvic ultrasound was performed on 2020. The uterus appeared normal.  Endometrial stripe was 7 mm. The right ovary was normal.  However the left ovary measured 6.5 x 6.4 x 6.2 cm. Again there was a simple appearing 5.5 cm simple appearing cyst.  Normal arterial and venous Doppler flow was noted. CT of the abdomen and pelvis was performed on 2020 because of left lower abdominal discomfort. The 5.5 cm simple appearing left ovarian cyst was noted. She also had a 3 mm calculus in the left mid ureter with some obstructive uropathy noted. Patient was seen back on 2020 for an annual exam.  Patient did give a history of having an abnormal Pap smear in 2006. This was followed by a LEEP procedure on her cervix. Her Paps have been normal since that time. Her last HPV typing was in  and was negative. Because of continued left lower abdominal discomfort a pelvic MRI was ordered. The MRI was performed on 2020 and confirmed a left ovarian mass measuring 6.3 x 6.0 x 6.5 cm with 2 thin septations. There were no mural nodules noted. One area showed \"mild homogeneous hyperintensity suggesting some enhancement however no convincing evidence for solid components\".   This could potentially be artifact. A short interval imaging in 3 months was recommended. The MRI was repeated of the pelvis with and without contrast on 3/11/2020. At that time there was an \"interval increase in size of the large left ovarian cystic lesion\". It measured 6.8 x 6.4 x 7.9 cm. There was a new T2 and T1 hyperintense focus along the medial aspect of the cyst which measures 2.5 x 1.0 x 2.2 cm\". No lymphadenopathy was noted no free fluid was noted. Surgical consultation was suggested. The patient then had tumor marker testing performed which included an ova 1 and Ca1 25 2. The Ca1 25 to test was slightly elevated at 68 units (less than 63 is normal). The over 1 score was 3.1. His CEA was also elevated at 4.9 (less than 3.9 is normal.    Repeat Ca1 25 performed on 3/17/2021 was also slightly elevated at 62 units (less than 38 units is normal). The patient has been referred to [facility] gynecologic oncology for further work-up and discussion of management options. The patient states that she has a history of Crohn's disease. She had an urgent laparotomy back in 2010 and had \"part of my colon removed\". She does see a gastroenterologist who has given her her first infusion of Stelara on March 11, 2021. She is set for a second infusion in mid May and will see the gastroenterologist back on May 20, 2021. She has no other major medical issues. She is a non-smoker. She has now decided that she does not wish to have any more children. She is not on birth control at the present time and was encouraged to obtain birth control from her PCP or David Padilla, [address] [place] [address]. Review of Systems  I have personally reviewed and agree with the review of systems done by my ancillary staff in the [facility] documentation.     OBJECTIVE:  Vitals:    04/06/21 0847   Pulse: 74   Temp: 97.5 °F (36.4 °C)   TempSrc: Temporal   SpO2: 97%   Weight: 208 lb 9.6 oz (94.6 kg)   Height: 5' 4\" (1.626 m)       General: Alert and oriented x3, no acute distress    HEENT:  EOM intact, MILEY, no cervical or supraclavicular lymphadenopathy. No Thyromegaly. Heart: Auscultation of heart revels a regular rate with no murmur, gallops, or rubs. Lungs:  Clear bilaterally to auscultation to the bases. CVA: No tenderness. Abdomen: Moderately obese. Large vertical incision from her previous hemicolectomy for Crohn's disease. No other scars noted. No hernias noted. No rashes. Palpation reveals no ascites. No splenomegaly. No organomegaly. No masses. The patient is tender in the left lower quadrant. There is no rebound. No masses palpable. There is no inguinal adenopathy on either side. Lower Extremities:  No lymphedema, no calf tenderness, no musculoskeletal deformities. Pelvic Exam: Normal external genitalia. No lesions are seen. Normal-appearing urethra, lower vagina, perineum and anus. A medium size lighted speculum was utilized to view the vagina. There was no blood or significant discharge in the vagina. The cervix is multiparous and large in size but without lesions. No vaginal lesions were noted. Last Pap was normal and HPV was negative. Bimanual examination revealed uterus of normal size and symmetry slightly retrocessed. I am unable to palpate either adnexal structure. Again there is slight tenderness in the left lower quadrant but no definite mass-effect noted. Family History   Problem Relation Age of Onset    Depression Mother     Hypertension Mother     Diabetes Mother     Hypertension Maternal Grandmother     Diabetes Maternal Grandmother     Cancer Maternal Grandfather         unknown    Hypertension Sister     Breast Cancer Paternal Grandmother         [de-identified]    Colon Cancer Neg Hx     Ovarian Cancer Neg Hx        Assessment:  1. Ovarian mass    2. Cyst of left ovary    3. Atypical squamous cells of undetermined significance (ASCUS) on Papanicolaou smear of cervix    4.  Crohn's disease of small risks and complications including, but not limited to blood loss requiring transfusions, DVT requiring blood thinning agents, injury to surrounding structures including bladder, bowel ureters, etc., as well as the possibility of infection requiring prophylactic antibiotics. The patient was counseled at length about the risks of hector Covid-19 during their perioperative period and any recovery window from their procedure. The patient was made aware that hector Covid-19  may worsen their prognosis for recovering from their procedure  and lend to a higher morbidity and/or mortality risk. All material risks, benefits, and reasonable alternatives including postponing the procedure were discussed. The patient does wish to proceed with the procedure at this time. I spent 60 minutes providing care to the patient and >50% of the time was spent counseling and conoordinating care, discussing the patient's current situation, reviewing her options, counseling and education her and answering her questions. All of the patient's pertinent images, labs and previous records and procedures were reviewed.     Electronically signed by Karl Stuart MD on 4/6/21 at 9:46 AM EDT

## 2021-04-06 NOTE — PROGRESS NOTES
Review of Systems   Gastrointestinal: Positive for abdominal distention (bloated), abdominal pain (LLQ) and diarrhea (Has crohn's disease). Musculoskeletal: Positive for back pain (not new). Hematological: Bruises/bleeds easily. All other systems reviewed and are negative.

## 2021-04-06 NOTE — TELEPHONE ENCOUNTER
From: Ana Sees  To: Sweta Barr APRN - CNM  Sent: 4/6/2021 11:51 AM EDT  Subject: Prescription Question    I am scheduled to have surgery in mid May and dr Genoveva Jain recommended that I be I some type of birth control.  I was wondering if birth control pills can be an option

## 2021-04-06 NOTE — PATIENT INSTRUCTIONS
[facility], [facility], [address] [address]    Dulcolax Bowel Preparation for Surgery    A few days before surgery purchase Dulcolax Laxative Tablets (generic name is Bisacodyl Tablets)    Be sure you purchase the Ducolax (Bisacodyl) LAXATIVE tablets (not stimulant tablets)    2 Days before Surgery:  · You may take your regular mediations, unless otherwise directed. · In the morning, take 4 Dulcolax laxative tablets. · Then drink clear liquids. Drink as much clear liquids as you can; this will prevent dehydration. · Do not eat any solid foods. · Continue to drink at least 8 ounces of clear liquid every 1-2 hours until bedtime. Day of Surgery:  · You may take your regular medications, unless otherwise directed. · You may continue to drink clear liquids up to 4 hours prior to surgery. Clear Liquid Diet:  · Bouillon (Chicken or beef; no meat or noodles), coffee and tea (you may use sugar/sweetener), gelatin, popsicles, water, all fruit juices (no pulp), all sports drinks (Gatorade, Power Michelle, Lemonade, or Lime michelle (no pulp), sherbet, clear soft drinks. NOTICE TO DIABETIC PATIENTS:  Make sure our office is aware that you are diabetic. Contact your primary care physician or endocrinologist for instructions on how to use your insulin or diabetic medications. POST OPERATIVE INSTRUCTIONS for HYSTERECTOMY    Pain Control:  · You may feel some chest, shoulder, or abdominal discomfort for a few days. This discomfort is a result of the gas that was introduced into the abdomen during surgery. Your body will absorb this gas within 24 to 48 hours which will relieve these symptoms. In the meantime, it may be helpful to apply heat to your abdomen or to lie flat. It is important to take a stool softener such as Colace while taking narcotic pain medication such as Percocet or Vicodin. Please purchase a stool softener before your surgery.   You may take \"over the [employer] relievers that do not contain aspirin such as acetaminophen (Tylenol) or Ibuprofen (Motrin or Advil). Do not exceed the daily recommended dose. Note:  If the Pain is not relieved by pain medication, becomes worse, or you have difficulty breathing, call our office. Incision Care:  · Inspect your incision(s) daily. · A small amount of blood or clear drainage from the incisions is normal and not a cause for concern. · Bruising around your incision sites is common and not a cause for concern. · Your incisions may become itchy for a few days. This is part of the normal healing process. · As your incisions heal, they will change in color and may become numb for several weeks. · If you have small dressings or band-aids, they may be removed 24 hours. · If you have steri-strips (small adhesive strips) in place, they will peel and fall off. If they do not fall off within 10 days, carefully peel them off. · If you have stitches, they will dissolve on their own. · If you have staples, they will be removed at the post-operative visit. Note:  If you notice any redness, heave drainage, or bleeding from your incisions, please call our office at [phone]. Nutrition:  You may resume the diet you had prior to surgery. Drink 6-8 glasses of water daily. Bowel Function: For the first several days after surgery, the bowel is usually less active. You may not have a regular bowel movement right away, pending on pre-op and bowel pain or pain medication use. Narcotic pain medications (Percocet, Vicodin, Hydrocodone, or Oxycodone) will increase constipation. Regular bowel movement may be less frequent. If constipation should occur:  · Drink more fluids  · Continue to take a stool softener such as Colace until constipation resolves.   · Take a mild laxative such as Milk of Magnesia    Swelling:  Mild abdominal swelling can occur following surgery due to slowing of the bowels and gas used to distend the abdomen during surgery. Swelling of the hands and lower extremities is common due to fluids given during surgery. Swelling of the face can occur due to positioning during surgery. If you have selling of the calves that is persistent or associated with redness, call our office. Activity:  If is normal to feel tired for a few days after surgery. Listen to your body and do not overdo it. · Walking is encouraged immediately after surgery, as tolerated. You should NOT be bedridden after surgery as continued movement will prevent prolonged recovery times due to \"deconditioning\". ·  If you could climb stairs un-aided prior to surgery you may resume climbing stairs after discharge following your procedure. · No strenuous activities such as heavy lifting (greater than 10 pounds or a gallon of milk), pushing or pulling for six weeks. · You may slowly increase your cardiovascular exercise after 2 weeks. Abdominal exercise should be avoided for 6 weeks. · Do Not drive while taking prescription pain medication or if your level of discomfort could inhibit your ability to operate a motor vehicle safely. · You may shower the day after surgery. Pat incisions dry. Do not rub incisions with washcloth or towel. Keep your incisions as dry as possible. · You may take a bath after six weeks. You must also wait 6 weeks to go into a swimming pool, hot tub, or the ocean. Vaginal Bleeding:  Light vaginal bleeding, spotting, or brown discharge for up to 6 weeks is common. Note: If you have heavy, bright red vaginal bleeding, call our office. Bleeding that fills a pad in one hour is considered to be heavy bleeding. Sexual [place]:  Avoid placing anything in the vagina for 8 weeks (ie. .Tampons, Douching, and Sexual intercourse).   Vaginal closure disruption can occur if sexual intercourse is resumed before healing is complete and will require additional surgery, which can lead to shortening of the vagina and painful intercourse. Follow Up Appointment:  Your follow up appointment will be scheduled on the same day we schedule your surgery. If one is not, please call the office when you get home to schedule. Call the office if you Experience:  ·  A fever higher than 100.4 Fahrenheit  · Increasing pain not controlled by pain medications  · Inability to eat or drink without vomiting  · Shortness of breath  · Inability to empty your bladder  · Redness and tenderness at the incision site, or a large amount of drainage  · Heavy, bright red vaginal bleeding or foul odor from discharge. You can expect to have a small amount of reddish-brown colored discharge up to 6 weeks. DO NOT BE ALARMED BY THIS. If you fell you need to be seen emergently, please go to the Emergency Department, if possible, where your surgery was performed so that our physicians may care for you. Any questions regarding your surgery or post-operative recovery should be directed to the staff at the [facility]) Gynecology Oncology at [phone], rather than your primary care physician. See your gastroenterologist for special instructions and timing for upcoming surgery. \    C the general surgeon before your surgery to discuss possible bowel resection.

## 2021-04-08 ENCOUNTER — TELEPHONE (OUTPATIENT)
Dept: OBGYN CLINIC | Age: 36
End: 2021-04-08

## 2021-04-08 RX ORDER — MEDROXYPROGESTERONE ACETATE 150 MG/ML
150 INJECTION, SUSPENSION INTRAMUSCULAR
Qty: 1 ML | Refills: 3 | Status: SHIPPED | OUTPATIENT
Start: 2021-04-08

## 2021-04-08 NOTE — TELEPHONE ENCOUNTER
Pt made an appointment on 04/09/21 for first Depo. Pt needs script sent to [facility] on [address]. Please approve or deny!

## 2021-04-08 NOTE — TELEPHONE ENCOUNTER
Discussed with patient via mychart.   Recommended pt be on birth control. I recommend progestin only d/t h/o migraines pt would like depo. Med sent.  Pt to abstain from intercourse for 2 weeks make an appt and can receive depo with neg pregnancy test

## 2021-04-12 ENCOUNTER — NURSE ONLY (OUTPATIENT)
Dept: OBGYN CLINIC | Age: 36
End: 2021-04-12
Payer: COMMERCIAL

## 2021-04-12 VITALS
HEART RATE: 71 BPM | SYSTOLIC BLOOD PRESSURE: 147 MMHG | WEIGHT: 210.5 LBS | BODY MASS INDEX: 36.13 KG/M2 | DIASTOLIC BLOOD PRESSURE: 98 MMHG

## 2021-04-12 DIAGNOSIS — Z30.42 ENCOUNTER FOR MANAGEMENT AND INJECTION OF DEPO-PROVERA: Primary | ICD-10-CM

## 2021-04-12 LAB
CONTROL: PRESENT
PREGNANCY TEST URINE, POC: NEGATIVE

## 2021-04-12 PROCEDURE — 96372 THER/PROPH/DIAG INJ SC/IM: CPT | Performed by: ADVANCED PRACTICE MIDWIFE

## 2021-04-12 PROCEDURE — 81025 URINE PREGNANCY TEST: CPT | Performed by: ADVANCED PRACTICE MIDWIFE

## 2021-04-12 RX ORDER — MEDROXYPROGESTERONE ACETATE 150 MG/ML
150 INJECTION, SUSPENSION INTRAMUSCULAR ONCE
Status: COMPLETED | OUTPATIENT
Start: 2021-04-12 | End: 2021-04-12

## 2021-04-12 RX ADMIN — MEDROXYPROGESTERONE ACETATE 150 MG: 150 INJECTION, SUSPENSION INTRAMUSCULAR at 10:02

## 2021-04-12 NOTE — PROGRESS NOTES
After obtaining consent, and per orders of Aure Pollock CNM, injection of medroxyprogesterone given in Left upper quad. gluteus by Myrtle Macias. Patient instructed to remain in clinic for 20 minutes afterwards, and to report any adverse reaction to me immediately.

## 2021-04-14 ENCOUNTER — OFFICE VISIT (OUTPATIENT)
Dept: SURGERY | Age: 36
End: 2021-04-14
Payer: COMMERCIAL

## 2021-04-14 VITALS
WEIGHT: 208 LBS | SYSTOLIC BLOOD PRESSURE: 130 MMHG | HEART RATE: 83 BPM | BODY MASS INDEX: 35.7 KG/M2 | DIASTOLIC BLOOD PRESSURE: 80 MMHG | TEMPERATURE: 97 F

## 2021-04-14 DIAGNOSIS — K50.919 CROHN'S DISEASE WITH COMPLICATION, UNSPECIFIED GASTROINTESTINAL TRACT LOCATION (HCC): Primary | ICD-10-CM

## 2021-04-14 PROCEDURE — G8417 CALC BMI ABV UP PARAM F/U: HCPCS | Performed by: STUDENT IN AN ORGANIZED HEALTH CARE EDUCATION/TRAINING PROGRAM

## 2021-04-14 PROCEDURE — 99213 OFFICE O/P EST LOW 20 MIN: CPT | Performed by: STUDENT IN AN ORGANIZED HEALTH CARE EDUCATION/TRAINING PROGRAM

## 2021-04-14 PROCEDURE — 1036F TOBACCO NON-USER: CPT | Performed by: STUDENT IN AN ORGANIZED HEALTH CARE EDUCATION/TRAINING PROGRAM

## 2021-04-14 PROCEDURE — G8427 DOCREV CUR MEDS BY ELIG CLIN: HCPCS | Performed by: STUDENT IN AN ORGANIZED HEALTH CARE EDUCATION/TRAINING PROGRAM

## 2021-04-14 NOTE — PATIENT INSTRUCTIONS
Thank you letting us take care of you today. We hope that all your questions were addressed. If a question was overlooked or something else comes to mind after you return home, please call our office at [phone]. If you need to cancel or change an appointment, surgery or procedure, please contact the office at [phone].

## 2021-04-22 ENCOUNTER — HOSPITAL ENCOUNTER (OUTPATIENT)
Age: 36
Discharge: HOME | End: 2021-04-22
Payer: COMMERCIAL

## 2021-04-22 LAB
ANION GAP SERPL CALCULATED.3IONS-SCNC: 7 MMOL/L (ref 9–17)
BUN BLDV-MCNC: 10 MG/DL (ref 6–20)
BUN/CREAT BLD: ABNORMAL (ref 9–20)
CALCIUM SERPL-MCNC: 9.6 MG/DL (ref 8.6–10.4)
CHLORIDE BLD-SCNC: 108 MMOL/L (ref 98–107)
CO2: 27 MMOL/L (ref 20–31)
CREAT SERPL-MCNC: 0.62 MG/DL (ref 0.5–0.9)
GFR AFRICAN AMERICAN: >60 ML/MIN
GFR NON-AFRICAN AMERICAN: >60 ML/MIN
GFR SERPL CREATININE-BSD FRML MDRD: ABNORMAL ML/MIN/{1.73_M2}
GFR SERPL CREATININE-BSD FRML MDRD: ABNORMAL ML/MIN/{1.73_M2}
GLUCOSE BLD-MCNC: 105 MG/DL (ref 70–99)
POTASSIUM SERPL-SCNC: 4 MMOL/L (ref 3.7–5.3)
SODIUM BLD-SCNC: 142 MMOL/L (ref 135–144)

## 2021-04-22 PROCEDURE — 80048 BASIC METABOLIC PNL TOTAL CA: CPT

## 2021-04-22 PROCEDURE — 36415 COLL VENOUS BLD VENIPUNCTURE: CPT

## 2021-05-05 ENCOUNTER — TELEPHONE (OUTPATIENT)
Dept: GYNECOLOGIC ONCOLOGY | Age: 36
End: 2021-05-05

## 2021-05-05 NOTE — TELEPHONE ENCOUNTER
Pt notified surgery is scheduled at [facility] on 5/18/21 @8:00am;arrive 6:00am. Preadmission testing is at [facility]'s  on 5/10/21 @8:30am.Covid testing is at [facility]. 's on 5/14/21 @3:00pm under the canopy outside ER parking lot. Notified to call with any question. She voiced undestanding.

## 2021-05-05 NOTE — TELEPHONE ENCOUNTER
Barbara Díaz with [facility] called regarding joint surgical case. She would like Danisha to return her call. [phone], option 1.

## 2021-05-06 ENCOUNTER — TELEPHONE (OUTPATIENT)
Dept: GYNECOLOGIC ONCOLOGY | Age: 36
End: 2021-05-06

## 2021-05-06 RX ORDER — SODIUM CHLORIDE, SODIUM LACTATE, POTASSIUM CHLORIDE, CALCIUM CHLORIDE 600; 310; 30; 20 MG/100ML; MG/100ML; MG/100ML; MG/100ML
1000 INJECTION, SOLUTION INTRAVENOUS CONTINUOUS
Status: CANCELLED | OUTPATIENT
Start: 2021-05-06

## 2021-05-06 NOTE — TELEPHONE ENCOUNTER
Received return call from [facility] states pt is cleared from surgery. They will send clearance letter within 24 hours.

## 2021-05-07 RX ORDER — SODIUM CHLORIDE 0.9 % (FLUSH) 0.9 %
10 SYRINGE (ML) INJECTION PRN
Status: CANCELLED | OUTPATIENT
Start: 2021-05-07

## 2021-05-07 RX ORDER — SODIUM CHLORIDE 9 MG/ML
INJECTION, SOLUTION INTRAVENOUS CONTINUOUS
Status: CANCELLED | OUTPATIENT
Start: 2021-05-07

## 2021-05-07 RX ORDER — CELECOXIB 100 MG/1
200 CAPSULE ORAL ONCE
Status: CANCELLED | OUTPATIENT
Start: 2021-05-07 | End: 2021-05-07

## 2021-05-07 RX ORDER — SODIUM CHLORIDE 0.9 % (FLUSH) 0.9 %
10 SYRINGE (ML) INJECTION EVERY 12 HOURS SCHEDULED
Status: CANCELLED | OUTPATIENT
Start: 2021-05-07

## 2021-05-07 RX ORDER — GABAPENTIN 600 MG/1
300 TABLET ORAL ONCE
Status: CANCELLED | OUTPATIENT
Start: 2021-05-07 | End: 2021-05-07

## 2021-05-07 RX ORDER — DEXAMETHASONE 4 MG/1
4 TABLET ORAL ONCE
Status: CANCELLED | OUTPATIENT
Start: 2021-05-07 | End: 2021-05-07

## 2021-05-07 RX ORDER — SODIUM CHLORIDE 9 MG/ML
25 INJECTION, SOLUTION INTRAVENOUS PRN
Status: CANCELLED | OUTPATIENT
Start: 2021-05-07

## 2021-05-07 RX ORDER — ONDANSETRON 2 MG/ML
4 INJECTION INTRAMUSCULAR; INTRAVENOUS ONCE
Status: CANCELLED | OUTPATIENT
Start: 2021-05-07 | End: 2021-05-07

## 2021-05-07 RX ORDER — ACETAMINOPHEN 500 MG
1000 TABLET ORAL ONCE
Status: CANCELLED | OUTPATIENT
Start: 2021-05-07 | End: 2021-05-07

## 2021-05-07 NOTE — PROGRESS NOTES
Patient was evaluated in PAT & anesthesia guidelines were applied. NPO guidelines, medication instructions and scheduled arrival time were reviewed with patient. Hx of anesthesia complications:  no  Family hx of anesthesia complications:  no    Last dose Stelara 5/7/21. Anesthesia contacted:   no    Medical or cardiac clearance ordered: no    [facility] has cleared patient- saw May 3rd. - copy on file. PCP has cleared on 4/29/21- in media.     BREANA WHITLOCK APRN-CNP  5/7/21  1:17 PM

## 2021-05-10 ENCOUNTER — HOSPITAL ENCOUNTER (OUTPATIENT)
Dept: GENERAL RADIOLOGY | Age: 36
Discharge: HOME | End: 2021-05-12
Payer: COMMERCIAL

## 2021-05-10 ENCOUNTER — HOSPITAL ENCOUNTER (OUTPATIENT)
Dept: PREADMISSION TESTING | Age: 36
Discharge: HOME | End: 2021-05-14
Payer: COMMERCIAL

## 2021-05-10 VITALS
TEMPERATURE: 96.8 F | HEIGHT: 64 IN | SYSTOLIC BLOOD PRESSURE: 126 MMHG | HEART RATE: 77 BPM | BODY MASS INDEX: 35.85 KG/M2 | WEIGHT: 210 LBS | OXYGEN SATURATION: 98 % | DIASTOLIC BLOOD PRESSURE: 82 MMHG | RESPIRATION RATE: 16 BRPM

## 2021-05-10 DIAGNOSIS — Z01.818 PRE-OP EXAMINATION: ICD-10-CM

## 2021-05-10 LAB
ABO/RH: NORMAL
ABSOLUTE EOS #: 0.17 K/UL (ref 0–0.44)
ABSOLUTE IMMATURE GRANULOCYTE: 0.03 K/UL (ref 0–0.3)
ABSOLUTE LYMPH #: 1.94 K/UL (ref 1.1–3.7)
ABSOLUTE MONO #: 0.48 K/UL (ref 0.1–1.2)
ALBUMIN SERPL-MCNC: 4.4 G/DL (ref 3.5–5.2)
ALBUMIN/GLOBULIN RATIO: 1.2 (ref 1–2.5)
ALP BLD-CCNC: 81 U/L (ref 35–104)
ALT SERPL-CCNC: 129 U/L (ref 5–33)
ANION GAP SERPL CALCULATED.3IONS-SCNC: 11 MMOL/L (ref 9–17)
ANTIBODY SCREEN: NEGATIVE
ARM BAND NUMBER: NORMAL
AST SERPL-CCNC: 67 U/L
BASOPHILS # BLD: 1 % (ref 0–2)
BASOPHILS ABSOLUTE: 0.06 K/UL (ref 0–0.2)
BILIRUB SERPL-MCNC: 0.42 MG/DL (ref 0.3–1.2)
BUN BLDV-MCNC: 14 MG/DL (ref 6–20)
BUN/CREAT BLD: ABNORMAL (ref 9–20)
CA 125: 47 U/ML
CALCIUM SERPL-MCNC: 9.6 MG/DL (ref 8.6–10.4)
CARCINOEMBRYONIC ANTIGEN: 5.2 NG/ML
CHLORIDE BLD-SCNC: 106 MMOL/L (ref 98–107)
CO2: 22 MMOL/L (ref 20–31)
CREAT SERPL-MCNC: 0.62 MG/DL (ref 0.5–0.9)
DIFFERENTIAL TYPE: NORMAL
EOSINOPHILS RELATIVE PERCENT: 2 % (ref 1–4)
EXPIRATION DATE: NORMAL
GFR AFRICAN AMERICAN: >60 ML/MIN
GFR NON-AFRICAN AMERICAN: >60 ML/MIN
GFR SERPL CREATININE-BSD FRML MDRD: ABNORMAL ML/MIN/{1.73_M2}
GFR SERPL CREATININE-BSD FRML MDRD: ABNORMAL ML/MIN/{1.73_M2}
GLUCOSE BLD-MCNC: 84 MG/DL (ref 70–99)
HCG QUALITATIVE: NEGATIVE
HCT VFR BLD CALC: 44.9 % (ref 36.3–47.1)
HEMOGLOBIN: 14.8 G/DL (ref 11.9–15.1)
IMMATURE GRANULOCYTES: 0 %
LYMPHOCYTES # BLD: 26 % (ref 24–43)
MCH RBC QN AUTO: 31.7 PG (ref 25.2–33.5)
MCHC RBC AUTO-ENTMCNC: 33 G/DL (ref 28.4–34.8)
MCV RBC AUTO: 96.1 FL (ref 82.6–102.9)
MONOCYTES # BLD: 7 % (ref 3–12)
NRBC AUTOMATED: 0 PER 100 WBC
PDW BLD-RTO: 13 % (ref 11.8–14.4)
PLATELET # BLD: 296 K/UL (ref 138–453)
PLATELET ESTIMATE: NORMAL
PMV BLD AUTO: 10.2 FL (ref 8.1–13.5)
POTASSIUM SERPL-SCNC: 4.1 MMOL/L (ref 3.7–5.3)
RBC # BLD: 4.67 M/UL (ref 3.95–5.11)
RBC # BLD: NORMAL 10*6/UL
SEG NEUTROPHILS: 64 % (ref 36–65)
SEGMENTED NEUTROPHILS ABSOLUTE COUNT: 4.74 K/UL (ref 1.5–8.1)
SODIUM BLD-SCNC: 139 MMOL/L (ref 135–144)
TOTAL PROTEIN: 8 G/DL (ref 6.4–8.3)
WBC # BLD: 7.4 K/UL (ref 3.5–11.3)
WBC # BLD: NORMAL 10*3/UL

## 2021-05-10 PROCEDURE — 36415 COLL VENOUS BLD VENIPUNCTURE: CPT

## 2021-05-10 PROCEDURE — 86304 IMMUNOASSAY TUMOR CA 125: CPT

## 2021-05-10 PROCEDURE — 71046 X-RAY EXAM CHEST 2 VIEWS: CPT

## 2021-05-10 PROCEDURE — 82378 CARCINOEMBRYONIC ANTIGEN: CPT

## 2021-05-10 PROCEDURE — 86850 RBC ANTIBODY SCREEN: CPT

## 2021-05-10 PROCEDURE — 84703 CHORIONIC GONADOTROPIN ASSAY: CPT

## 2021-05-10 PROCEDURE — 86901 BLOOD TYPING SEROLOGIC RH(D): CPT

## 2021-05-10 PROCEDURE — 86900 BLOOD TYPING SEROLOGIC ABO: CPT

## 2021-05-10 PROCEDURE — 85025 COMPLETE CBC W/AUTO DIFF WBC: CPT

## 2021-05-10 PROCEDURE — 93005 ELECTROCARDIOGRAM TRACING: CPT

## 2021-05-10 PROCEDURE — 80053 COMPREHEN METABOLIC PANEL: CPT

## 2021-05-10 RX ORDER — ACETAMINOPHEN 500 MG
500 TABLET ORAL PRN
COMMUNITY

## 2021-05-11 LAB
EKG ATRIAL RATE: 68 BPM
EKG P AXIS: 61 DEGREES
EKG P-R INTERVAL: 170 MS
EKG Q-T INTERVAL: 404 MS
EKG QRS DURATION: 92 MS
EKG QTC CALCULATION (BAZETT): 429 MS
EKG R AXIS: 29 DEGREES
EKG T AXIS: 24 DEGREES
EKG VENTRICULAR RATE: 68 BPM

## 2021-05-11 PROCEDURE — 93010 ELECTROCARDIOGRAM REPORT: CPT | Performed by: INTERNAL MEDICINE

## 2021-05-14 ENCOUNTER — HOSPITAL ENCOUNTER (OUTPATIENT)
Dept: LAB | Age: 36
Setting detail: SPECIMEN
Discharge: HOME | End: 2021-05-14
Payer: COMMERCIAL

## 2021-05-14 DIAGNOSIS — Z20.822 COVID-19 RULED OUT BY LABORATORY TESTING: Primary | ICD-10-CM

## 2021-05-14 PROCEDURE — U0003 INFECTIOUS AGENT DETECTION BY NUCLEIC ACID (DNA OR RNA); SEVERE ACUTE RESPIRATORY SYNDROME CORONAVIRUS 2 (SARS-COV-2) (CORONAVIRUS DISEASE [COVID-19]), AMPLIFIED PROBE TECHNIQUE, MAKING USE OF HIGH THROUGHPUT TECHNOLOGIES AS DESCRIBED BY CMS-2020-01-R: HCPCS

## 2021-05-14 PROCEDURE — U0005 INFEC AGEN DETEC AMPLI PROBE: HCPCS

## 2021-05-16 LAB
SARS-COV-2: NORMAL
SARS-COV-2: NOT DETECTED
SOURCE: NORMAL

## 2021-05-17 ENCOUNTER — TELEPHONE (OUTPATIENT)
Dept: GYNECOLOGIC ONCOLOGY | Age: 36
End: 2021-05-17

## 2021-05-17 DIAGNOSIS — N83.8 OVARIAN MASS: Primary | ICD-10-CM

## 2021-05-17 NOTE — TELEPHONE ENCOUNTER
I called her regarding any questions that she might have regarding the surgery tomorrow. Message left.     Adrian Edwards MD    5/17/2021 alert/oriented to person, place, time/situation/awake

## 2021-05-17 NOTE — H&P
OB/GYN Pre-Op H&P  Ivan Elizondo    Patient Name: Dimas Pollock     Patient : 1985  Room/Bed: [facility] OR [place] RM/NONE  Admission Date/Time: 2021  5:50 AM  Primary Care Physician: Refugio Culp  MRN: [id number]    Date: 2021  Time: 7:13 AM    The patient was seen in pre-op holding. She is here for scheduled robotic assisted laparoscopic left salpingo-oophorectomy and right salpingectomy with peritoneal washings for cytology, possible right oophorectomy, hysterectomy, and surgical staging biopsies. Possible laparotomy and possible bowel resection. This is a coprocedure with general surgery. HPI from Dr. Damion Jones at office visit on 2021:  \"HPI  3, para 3 (all vaginal deliveries) she is a 30 y.o. female who is being referred for a persistent slightly enlarging, septated, left ovarian cyst.  Patient states that she first had left pelvic discomfort a little over a year ago. She saw Raj Fernando. A pelvic ultrasound revealed a left ovarian cyst measuring 5.5 cm. The decision was made to repeat the ultrasound in 1 year. She continued to have pain off and on. A repeat pelvic ultrasound was performed on 2020. The uterus appeared normal.  Endometrial stripe was 7 mm. The right ovary was normal.  However the left ovary measured 6.5 x 6.4 x 6.2 cm. Again there was a simple appearing 5.5 cm simple appearing cyst.  Normal arterial and venous Doppler flow was noted. CT of the abdomen and pelvis was performed on 2020 because of left lower abdominal discomfort. The 5.5 cm simple appearing left ovarian cyst was noted. She also had a 3 mm calculus in the left mid ureter with some obstructive uropathy noted. Patient was seen back on 2020 for an annual exam.  Patient did give a history of having an abnormal Pap smear in . This was followed by a LEEP procedure on her cervix. Her Paps have been normal since that time.      Her last HPV typing was in 2019 and was negative. Because of continued left lower abdominal discomfort a pelvic MRI was ordered. The MRI was performed on 11/13/2020 and confirmed a left ovarian mass measuring 6.3 x 6.0 x 6.5 cm with 2 thin septations. There were no mural nodules noted. One area showed \"mild homogeneous hyperintensity suggesting some enhancement however no convincing evidence for solid components\". This could potentially be artifact. A short interval imaging in 3 months was recommended. The MRI was repeated of the pelvis with and without contrast on 3/11/2020. At that time there was an \"interval increase in size of the large left ovarian cystic lesion\". It measured 6.8 x 6.4 x 7.9 cm. There was a new T2 and T1 hyperintense focus along the medial aspect of the cyst which measures 2.5 x 1.0 x 2.2 cm\". No lymphadenopathy was noted no free fluid was noted. Surgical consultation was suggested. The patient then had tumor marker testing performed which included an ova 1 and Ca1 25 2. The Ca1 25 to test was slightly elevated at 68 units (less than 63 is normal). The over 1 score was 3.1. His CEA was also elevated at 4.9 (less than 3.9 is normal.     Repeat Ca1 25 performed on 3/17/2021 was also slightly elevated at 62 units (less than 38 units is normal). The patient has been referred to [facility] gynecologic oncology for further work-up and discussion of management options. The patient states that she has a history of Crohn's disease. She had an urgent laparotomy back in 2010 and had \"part of my colon removed\". She does see a gastroenterologist who has given her her first infusion of Stelara on March 11, 2021. She is set for a second infusion in mid May and will see the gastroenterologist back on May 20, 2021. She has no other major medical issues. She is a non-smoker. She has now decided that she does not wish to have any more children.   She is not on birth control at the present time and was encouraged to obtain birth control from her PCP or TODD Fernando. \"    The procedure risks and complications were reviewed. The labs, Consent, and H&P were reviewed and updated. The patient was counseled on the possibility of  the need of a second surgery. The patient voiced understanding and had all of her questions answered. The possibility of incomplete removal of abnormal tissue was discussed. OBSTETRICAL HISTORY:   OB History    Para Term  AB Living   3 3 3 0 0 3   SAB TAB Ectopic Molar Multiple Live Births   0 0 0 0 0 3      # Outcome Date GA Lbr Shashank/2nd Weight Sex Delivery Anes PTL Lv   3 Term 14     Vag-Spont   MISTY   2 Term 07 39w0d  8 lb 8 oz (3.856 kg) F Vag-Spont   MISTY      Name: Suzie Nephew   1 Term 2004 40w0d  7 lb 14 oz (3.572 kg) M Vag-Spont   MISTY      Name: Gee Burger       PAST MEDICAL HISTORY:   has a past medical history of Abnormal Pap smear of cervix, ASCUS with positive high risk HPV, Crohn's disease ([facility] [address] Elevated LFTs, Fracture, Headache, Hearing loss, Hypoglycemia, Ovarian cyst, Personal history of other medical treatment, Wellness examination, Wellness examination, Wellness examination, and Wellness examination. PAST SURGICAL HISTORY:   has a past surgical history that includes hemicolectomy; Tympanostomy tube placement; Tonsillectomy and adenoidectomy; LEEP (2006); Tooth Extraction (2015); Cholecystectomy, laparoscopic (2019); Cholecystectomy, laparoscopic (N/A, 2019); Colonoscopy; and Endoscopy, colon, diagnostic.     ALLERGIES:  Allergies as of 2021 - Review Complete 2021   Allergen Reaction Noted    Claritin [loratadine]  2013    Guaifenesin er  2013    Loratadine  2013    Robitussin (alcohol free) [guaifenesin]  2013    Infliximab Nausea And Vomiting 2013       MEDICATIONS:  Current Facility-Administered Medications   Medication Dose Route Frequency Provider Last Rate Last Admin    lactated ringers infusion 1,000 mL  1,000 mL Intravenous Continuous Raymond Gonsalez MD        0.9 % sodium chloride infusion   Intravenous Continuous Elisa Lazo PA-C        0.9 % sodium chloride infusion  25 mL Intravenous PRN Elisa Lazo PA-C        ceFAZolin (ANCEF) 2000 mg in dextrose 5 % 50 mL IVPB  2,000 mg Intravenous On Call to [address], LEV        enoxaparin (LOVENOX) injection 40 mg  40 mg Subcutaneous Once [employer], PA-TRISTAN        sodium chloride flush 0.9 % injection 10 mL  10 mL Intravenous 2 times per day Elisa LEV Lazo        sodium chloride flush 0.9 % injection 10 mL  10 mL Intravenous PRN Elisa Lazo PA-C        acetaminophen (TYLENOL) tablet 1,000 mg  1,000 mg Oral Once [employer], PA-C        celecoxib (CELEBREX) capsule 200 mg  200 mg Oral Once [employer], PA-C        dexamethasone (DECADRON) tablet 4 mg  4 mg Oral Once [employer], PA-TRISTAN        gabapentin (NEURONTIN) capsule 300 mg  300 mg Oral Once [employer], PA-C        ondansetron (ZOFRAN) injection 4 mg  4 mg Intravenous Once [employer]LEV           FAMILY HISTORY:  family history includes Breast Cancer in her paternal grandmother; Cancer in her maternal grandfather; Depression in her mother; Diabetes in her maternal grandmother and mother; Hypertension in her maternal grandmother, mother, and sister. SOCIAL HISTORY:   reports that she quit smoking about 10 years ago. She has never used smokeless tobacco. She reports current alcohol use. She reports that she does not use drugs.     VITALS:  Vitals:    05/18/21 0635   BP: 124/87   Pulse: 77   Resp: 18   Temp: 98.1 °F (36.7 °C)   TempSrc: Temporal   SpO2: 97%   Weight: 205 lb (93 kg)   Height: 5' 4\" (1.626 m)                                                                                                                              PHYSICAL EXAM:     Unchanged from Prior H&P  CONSTITUTIONAL:  Alert and oriented, no acute distress  HEAD: normocephalic, atraumatic  EYES: Pupils equal and reactive to light, Extraocular muscles intact, sclera non icteric  ENT: Mucus membranes moist, No otorrhea, no rhinorrhea  NECK:  supple, symmetrical, trachea midline   LUNGS:  Good air movement bilaterally, unlabored respirations, no wheezes or rhonchi  CARDIOVASCULAR: Regular rate and rhythm, no murmurs rubs or gallops  ABDOMEN: soft, non tender, non distended, no rebound or guarding, no hernias, no hepatomegaly, no splenomegly  MUSCULOSKELETAL:  Equal strength bilaterally, normal muscle tone  SKIN: No abscess or rash  NEUROLOGIC:  Cranial nerves 2-12 grossly intact, no focal deficits  PSYCH: affect appropriate  Pelvic Exam: deferred to OR      LAB RESULTS:  Hospital Outpatient Visit on 05/14/2021   Component Date Value Ref Range Status    SARS-CoV-2 05/14/2021        Final    Source 05/14/2021 . NASOPHARYNGEAL SWAB   Final    SARS-CoV-2 05/14/2021 Not Detected  Not Detected Final    Comment:       The specimen is NEGATIVE for SARS-CoV-2, the novel coronavirus associated with COVID-19. A negative result does not rule out COVID-19. [employer] SARS-CoV-2 for use on the [employer] 6800/8800 Systems is a real-time RT-PCR test intended   for the qualitative detection of nucleic acids from SARS-CoV-2  in clinician-collected nasal, nasopharyngeal, and oropharyngeal swab specimens from   individuals who meet COVID-19 clinical and/or epidemiological criteria. [employer] SARS-CoV-2 is for use only under Emergency Use Authorization (EUA) in laboratories   certified under [address] (CLIA), 42 U. S.C. §750W,   that meet requirements to perform high or moderate complexity tests. An individual without symptoms of COVID-19 and who is not shedding SARS-CoV-2 virus would   expect to have a negative (not detected) result in this assay.   Fact sheet for Healthcare Providers: Elina  Fact sheet for Patients: https://www.                           fda.gov/media/859790/download        METHODOLOGY: RT-PCR     Hospital Outpatient Visit on 05/10/2021   Component Date Value Ref Range Status    Ventricular Rate 05/10/2021 68  BPM Final    Atrial Rate 05/10/2021 68  BPM Final    P-R Interval 05/10/2021 170  ms Final    QRS Duration 05/10/2021 92  ms Final    Q-T Interval 05/10/2021 404  ms Final    QTc Calculation (Bazett) 05/10/2021 429  ms Final    P Axis 05/10/2021 61  degrees Final    R Axis 05/10/2021 29  degrees Final    T Axis 05/10/2021 24  degrees Final    WBC 05/10/2021 7.4  3.5 - 11.3 k/uL Final    RBC 05/10/2021 4.67  3.95 - 5.11 m/uL Final    Hemoglobin 05/10/2021 14.8  11.9 - 15.1 g/dL Final    Hematocrit 05/10/2021 44.9  36.3 - 47.1 % Final    MCV 05/10/2021 96.1  82.6 - 102.9 fL Final    MCH 05/10/2021 31.7  25.2 - 33.5 pg Final    MCHC 05/10/2021 33.0  28.4 - 34.8 g/dL Final    RDW 05/10/2021 13.0  11.8 - 14.4 % Final    Platelets 06/76/0169 296  138 - 453 k/uL Final    MPV 05/10/2021 10.2  8.1 - 13.5 fL Final    NRBC Automated 05/10/2021 0.0  0.0 per 100 WBC Final    Differential Type 05/10/2021 NOT REPORTED   Final    Seg Neutrophils 05/10/2021 64  36 - 65 % Final    Lymphocytes 05/10/2021 26  24 - 43 % Final    Monocytes 05/10/2021 7  3 - 12 % Final    Eosinophils % 05/10/2021 2  1 - 4 % Final    Basophils 05/10/2021 1  0 - 2 % Final    Immature Granulocytes 05/10/2021 0  0 % Final    Segs Absolute 05/10/2021 4.74  1.50 - 8.10 k/uL Final    Absolute Lymph # 05/10/2021 1.94  1.10 - 3.70 k/uL Final    Absolute Mono # 05/10/2021 0.48  0.10 - 1.20 k/uL Final    Absolute Eos # 05/10/2021 0.17  0.00 - 0.44 k/uL Final    Basophils Absolute 05/10/2021 0.06  0.00 - 0.20 k/uL Final    Absolute Immature Granulocyte 05/10/2021 0.03  0.00 - 0.30 k/uL Final    WBC Morphology 05/10/2021 NOT REPORTED   Final    RBC Morphology 05/10/2021 NOT REPORTED   Final    Platelet Estimate 55/54/9898 NOT REPORTED pleural effusions. There is no acute osseous abnormality. No acute cardiopulmonary process     MRI Pelvis 3/11/21:  Narrative   EXAMINATION:   MRI OF THE PELVIS WITHOUT AND WITH CONTRAST, 3/11/2021 7:37 am       TECHNIQUE:   Multiplanar multisequence MRI of the pelvis was performed without and with   the administration of intravenous contrast.       COMPARISON:   MRI pelvis performed 11/13/2020. HISTORY:   ORDERING SYSTEM PROVIDED HISTORY: Cyst of left ovary   TECHNOLOGIST PROVIDED HISTORY:   f/u from previous MRI   Is the patient pregnant?->No   Reason for Exam: patient states this is her 3 month follow-up for a cyst on   her left ovary; patient states the pain has got a little worse since her last   scan. Acuity: Chronic   Type of Exam: Subsequent/Follow-up   Relevant Medical/Surgical History: h/o crohns; surg - leep       FINDINGS:   There is interval increase in size of a large left ovarian cystic lesion   currently measuring 6.8 x 6.4 x 7.9 cm, previously 6.3 x 6.0 x 6.5 cm. There   are a few thin internal septations noted in the anterior left lateral aspect   of the cyst, grossly unchanged from prior examination. No definite   enhancement of the septations is demonstrated. There is a new T2 hypointense   and T1 hyperintense focus along the medial aspect of the cyst wall which   measures 2.5 x 1.0 x 2.2 cm. There is no convincing contrast enhancement on   postcontrast sequences. The left ovarian tissue is noted peripherally along   the posterior left lateral aspect of the cyst.       The right ovary is grossly unremarkable. The uterus is grossly unremarkable. The cervix and vagina are without acute   abnormality. Trace pelvic free fluid, possibly physiologic. There is circumferential urinary bladder wall thickening, likely related to   underdistention. The visualized bowel is nondilated. No significant pelvic lymphadenopathy. No acute soft tissue abnormality.    Marrow signal is within normal limits. Impression   Interval enlargement of a complex cyst involving the left ovary currently   measuring up to 7.9 cm, previously 6.5 cm. Nonenhancing thin internal   septations are again noted. However, there is interval development of a T1   hyperintense nodule along the medial aspect of the cyst measuring up to 2.5   cm without convincing contrast enhancement on post-contrast sequences which   could represent a focal collection of hemorrhagic or proteinaceous material.   Findings could be further followed up with MRI pelvis at 3 months; however,   given interval increase in size surgical consultation can also be considered. DIAGNOSIS & PLAN:  1. Ovarian Mass   - Proceed with planned procedure: robotic assisted laparoscopic left salpingo-oophorectomy and right salpingectomy with peritoneal washings for cytology, possible right oophorectomy, hysterectomy, and surgical staging biopsies. Possible laparotomy and possible bowel resection. Coprocedure with General Surgery. - Consent signed, on chart. - The patient is ready for transport to the operative suite. 2. Left ovarian cyst  3. ASCUS on Pap of Cervix  4. Crohn's Disease  5. Elevated CEA and  tumor antigen  6. Chronic left lower abdominal and pelvic pain    Counseling: The patient was counseled on all options both medical and surgical, conservative as well as definitive. She has elected to proceed with the procedure as stated above. The patient was counseled on the procedure. Risks and complications were reviewed in detail. The patients orders, labs, consents have been completed. The history and physical as well as all supporting surgical documentation will be forwarded to the pre-operative holding area. The patient is aware that this procedure may not alleviate her symptoms.  That there may be a necessity for a second surgery and that there may be an incomplete removal of abnormal tissue. Kit Dhillon DO  Ob/Gyn Resident  Pager: [phone] [address] [facility], [address]  5/18/2021, 7:13 AM          Attending Physician Statement  I have discussed the care of Angus Kwong, including pertinent history and exam findings,  with the resident. I have seen and examined the patient and the key elements of all parts of the encounter have been performed by me. I agree with the assessment, plan and orders as documented by the resident.      Rocio Dowd MD

## 2021-05-18 ENCOUNTER — HOSPITAL ENCOUNTER (OUTPATIENT)
Age: 36
Setting detail: SURGERY ADMIT
Discharge: HOME | End: 2021-05-18
Attending: SURGERY | Admitting: SURGERY
Payer: COMMERCIAL

## 2021-05-18 ENCOUNTER — ANESTHESIA (OUTPATIENT)
Dept: OPERATING ROOM | Age: 36
End: 2021-05-18
Payer: COMMERCIAL

## 2021-05-18 ENCOUNTER — ANESTHESIA EVENT (OUTPATIENT)
Dept: OPERATING ROOM | Age: 36
End: 2021-05-18
Payer: COMMERCIAL

## 2021-05-18 VITALS — DIASTOLIC BLOOD PRESSURE: 54 MMHG | SYSTOLIC BLOOD PRESSURE: 119 MMHG | TEMPERATURE: 98.5 F | OXYGEN SATURATION: 100 %

## 2021-05-18 VITALS
SYSTOLIC BLOOD PRESSURE: 127 MMHG | TEMPERATURE: 97.3 F | OXYGEN SATURATION: 99 % | WEIGHT: 205 LBS | HEIGHT: 64 IN | HEART RATE: 84 BPM | DIASTOLIC BLOOD PRESSURE: 68 MMHG | RESPIRATION RATE: 16 BRPM | BODY MASS INDEX: 35 KG/M2

## 2021-05-18 DIAGNOSIS — G89.18 POST-OP PAIN: Primary | ICD-10-CM

## 2021-05-18 PROBLEM — Z90.721 S/P LEFT OOPHORECTOMY: Status: ACTIVE | Noted: 2021-05-18

## 2021-05-18 LAB
CASE NUMBER:: NORMAL
CASE NUMBER:: NORMAL
HCG, PREGNANCY URINE (POC): NEGATIVE
SPECIMEN DESCRIPTION: NORMAL
SPECIMEN DESCRIPTION: NORMAL

## 2021-05-18 PROCEDURE — 6360000002 HC RX W HCPCS: Performed by: PHYSICIAN ASSISTANT

## 2021-05-18 PROCEDURE — 2580000003 HC RX 258: Performed by: ANESTHESIOLOGY

## 2021-05-18 PROCEDURE — 81025 URINE PREGNANCY TEST: CPT

## 2021-05-18 PROCEDURE — 58661 LAPAROSCOPY REMOVE ADNEXA: CPT | Performed by: OBSTETRICS & GYNECOLOGY

## 2021-05-18 PROCEDURE — 2709999900 HC NON-CHARGEABLE SUPPLY: Performed by: SURGERY

## 2021-05-18 PROCEDURE — 6360000002 HC RX W HCPCS: Performed by: NURSE ANESTHETIST, CERTIFIED REGISTERED

## 2021-05-18 PROCEDURE — 88112 CYTOPATH CELL ENHANCE TECH: CPT

## 2021-05-18 PROCEDURE — 3600000019 HC SURGERY ROBOT ADDTL 15MIN: Performed by: SURGERY

## 2021-05-18 PROCEDURE — 7100000011 HC PHASE II RECOVERY - ADDTL 15 MIN: Performed by: SURGERY

## 2021-05-18 PROCEDURE — 6370000000 HC RX 637 (ALT 250 FOR IP): Performed by: ANESTHESIOLOGY

## 2021-05-18 PROCEDURE — 2500000003 HC RX 250 WO HCPCS: Performed by: SURGERY

## 2021-05-18 PROCEDURE — 87086 URINE CULTURE/COLONY COUNT: CPT

## 2021-05-18 PROCEDURE — 2580000003 HC RX 258: Performed by: SURGERY

## 2021-05-18 PROCEDURE — S2900 ROBOTIC SURGICAL SYSTEM: HCPCS | Performed by: SURGERY

## 2021-05-18 PROCEDURE — 7100000001 HC PACU RECOVERY - ADDTL 15 MIN: Performed by: SURGERY

## 2021-05-18 PROCEDURE — 2500000003 HC RX 250 WO HCPCS: Performed by: NURSE ANESTHETIST, CERTIFIED REGISTERED

## 2021-05-18 PROCEDURE — 6370000000 HC RX 637 (ALT 250 FOR IP): Performed by: PHYSICIAN ASSISTANT

## 2021-05-18 PROCEDURE — 7100000010 HC PHASE II RECOVERY - FIRST 15 MIN: Performed by: SURGERY

## 2021-05-18 PROCEDURE — 88307 TISSUE EXAM BY PATHOLOGIST: CPT

## 2021-05-18 PROCEDURE — 3700000000 HC ANESTHESIA ATTENDED CARE: Performed by: SURGERY

## 2021-05-18 PROCEDURE — 88331 PATH CONSLTJ SURG 1 BLK 1SPC: CPT

## 2021-05-18 PROCEDURE — 7100000000 HC PACU RECOVERY - FIRST 15 MIN: Performed by: SURGERY

## 2021-05-18 PROCEDURE — 3700000001 HC ADD 15 MINUTES (ANESTHESIA): Performed by: SURGERY

## 2021-05-18 PROCEDURE — 3600000009 HC SURGERY ROBOT BASE: Performed by: SURGERY

## 2021-05-18 PROCEDURE — 88305 TISSUE EXAM BY PATHOLOGIST: CPT

## 2021-05-18 PROCEDURE — C1760 CLOSURE DEV, VASC: HCPCS | Performed by: SURGERY

## 2021-05-18 PROCEDURE — 2780000010 HC IMPLANT OTHER: Performed by: SURGERY

## 2021-05-18 RX ORDER — SODIUM CHLORIDE 0.9 % (FLUSH) 0.9 %
10 SYRINGE (ML) INJECTION EVERY 12 HOURS SCHEDULED
Status: DISCONTINUED | OUTPATIENT
Start: 2021-05-18 | End: 2021-05-18 | Stop reason: HOSPADM

## 2021-05-18 RX ORDER — ROCURONIUM BROMIDE 10 MG/ML
INJECTION, SOLUTION INTRAVENOUS PRN
Status: DISCONTINUED | OUTPATIENT
Start: 2021-05-18 | End: 2021-05-18 | Stop reason: SDUPTHER

## 2021-05-18 RX ORDER — KETAMINE HYDROCHLORIDE 10 MG/ML
INJECTION, SOLUTION INTRAMUSCULAR; INTRAVENOUS PRN
Status: DISCONTINUED | OUTPATIENT
Start: 2021-05-18 | End: 2021-05-18 | Stop reason: SDUPTHER

## 2021-05-18 RX ORDER — SODIUM CHLORIDE 0.9 % (FLUSH) 0.9 %
10 SYRINGE (ML) INJECTION PRN
Status: DISCONTINUED | OUTPATIENT
Start: 2021-05-18 | End: 2021-05-18 | Stop reason: HOSPADM

## 2021-05-18 RX ORDER — CELECOXIB 100 MG/1
200 CAPSULE ORAL ONCE
Status: COMPLETED | OUTPATIENT
Start: 2021-05-18 | End: 2021-05-18

## 2021-05-18 RX ORDER — PHENYLEPHRINE HYDROCHLORIDE 10 MG/ML
INJECTION INTRAVENOUS PRN
Status: DISCONTINUED | OUTPATIENT
Start: 2021-05-18 | End: 2021-05-18 | Stop reason: SDUPTHER

## 2021-05-18 RX ORDER — PROPOFOL 10 MG/ML
INJECTION, EMULSION INTRAVENOUS PRN
Status: DISCONTINUED | OUTPATIENT
Start: 2021-05-18 | End: 2021-05-18 | Stop reason: SDUPTHER

## 2021-05-18 RX ORDER — ONDANSETRON 2 MG/ML
INJECTION INTRAMUSCULAR; INTRAVENOUS PRN
Status: DISCONTINUED | OUTPATIENT
Start: 2021-05-18 | End: 2021-05-18 | Stop reason: SDUPTHER

## 2021-05-18 RX ORDER — DIPHENHYDRAMINE HYDROCHLORIDE 50 MG/ML
INJECTION INTRAMUSCULAR; INTRAVENOUS PRN
Status: DISCONTINUED | OUTPATIENT
Start: 2021-05-18 | End: 2021-05-18 | Stop reason: SDUPTHER

## 2021-05-18 RX ORDER — CEFAZOLIN SODIUM 2 G/100ML
INJECTION, SOLUTION INTRAVENOUS PRN
Status: DISCONTINUED | OUTPATIENT
Start: 2021-05-18 | End: 2021-05-18 | Stop reason: SDUPTHER

## 2021-05-18 RX ORDER — DIPHENHYDRAMINE HYDROCHLORIDE 50 MG/ML
12.5 INJECTION INTRAMUSCULAR; INTRAVENOUS
Status: DISCONTINUED | OUTPATIENT
Start: 2021-05-18 | End: 2021-05-18 | Stop reason: HOSPADM

## 2021-05-18 RX ORDER — MAGNESIUM HYDROXIDE 1200 MG/15ML
LIQUID ORAL CONTINUOUS PRN
Status: COMPLETED | OUTPATIENT
Start: 2021-05-18 | End: 2021-05-18

## 2021-05-18 RX ORDER — SODIUM CHLORIDE 9 MG/ML
25 INJECTION, SOLUTION INTRAVENOUS PRN
Status: DISCONTINUED | OUTPATIENT
Start: 2021-05-18 | End: 2021-05-18 | Stop reason: HOSPADM

## 2021-05-18 RX ORDER — OXYCODONE HYDROCHLORIDE AND ACETAMINOPHEN 5; 325 MG/1; MG/1
2 TABLET ORAL PRN
Status: COMPLETED | OUTPATIENT
Start: 2021-05-18 | End: 2021-05-18

## 2021-05-18 RX ORDER — MIDAZOLAM HYDROCHLORIDE 1 MG/ML
INJECTION INTRAMUSCULAR; INTRAVENOUS PRN
Status: DISCONTINUED | OUTPATIENT
Start: 2021-05-18 | End: 2021-05-18 | Stop reason: SDUPTHER

## 2021-05-18 RX ORDER — SODIUM CHLORIDE, SODIUM LACTATE, POTASSIUM CHLORIDE, CALCIUM CHLORIDE 600; 310; 30; 20 MG/100ML; MG/100ML; MG/100ML; MG/100ML
1000 INJECTION, SOLUTION INTRAVENOUS CONTINUOUS
Status: DISCONTINUED | OUTPATIENT
Start: 2021-05-18 | End: 2021-05-18 | Stop reason: HOSPADM

## 2021-05-18 RX ORDER — SIMETHICONE 80 MG
80 TABLET,CHEWABLE ORAL 4 TIMES DAILY PRN
Qty: 30 TABLET | Refills: 0 | Status: SHIPPED | OUTPATIENT
Start: 2021-05-18 | End: 2021-07-08

## 2021-05-18 RX ORDER — FENTANYL CITRATE 50 UG/ML
25 INJECTION, SOLUTION INTRAMUSCULAR; INTRAVENOUS EVERY 5 MIN PRN
Status: DISCONTINUED | OUTPATIENT
Start: 2021-05-18 | End: 2021-05-18 | Stop reason: HOSPADM

## 2021-05-18 RX ORDER — FENTANYL CITRATE 50 UG/ML
INJECTION, SOLUTION INTRAMUSCULAR; INTRAVENOUS PRN
Status: DISCONTINUED | OUTPATIENT
Start: 2021-05-18 | End: 2021-05-18 | Stop reason: SDUPTHER

## 2021-05-18 RX ORDER — MORPHINE SULFATE 2 MG/ML
2 INJECTION, SOLUTION INTRAMUSCULAR; INTRAVENOUS EVERY 5 MIN PRN
Status: DISCONTINUED | OUTPATIENT
Start: 2021-05-18 | End: 2021-05-18 | Stop reason: HOSPADM

## 2021-05-18 RX ORDER — GLYCOPYRROLATE 1 MG/5 ML
SYRINGE (ML) INTRAVENOUS PRN
Status: DISCONTINUED | OUTPATIENT
Start: 2021-05-18 | End: 2021-05-18 | Stop reason: SDUPTHER

## 2021-05-18 RX ORDER — GABAPENTIN 300 MG/1
300 CAPSULE ORAL ONCE
Status: COMPLETED | OUTPATIENT
Start: 2021-05-18 | End: 2021-05-18

## 2021-05-18 RX ORDER — LABETALOL HYDROCHLORIDE 5 MG/ML
5 INJECTION, SOLUTION INTRAVENOUS EVERY 10 MIN PRN
Status: DISCONTINUED | OUTPATIENT
Start: 2021-05-18 | End: 2021-05-18 | Stop reason: HOSPADM

## 2021-05-18 RX ORDER — DEXAMETHASONE SODIUM PHOSPHATE 10 MG/ML
INJECTION INTRAMUSCULAR; INTRAVENOUS PRN
Status: DISCONTINUED | OUTPATIENT
Start: 2021-05-18 | End: 2021-05-18 | Stop reason: SDUPTHER

## 2021-05-18 RX ORDER — HYDROCODONE BITARTRATE AND ACETAMINOPHEN 5; 325 MG/1; MG/1
1 TABLET ORAL EVERY 4 HOURS PRN
Status: DISCONTINUED | OUTPATIENT
Start: 2021-05-18 | End: 2021-05-18 | Stop reason: HOSPADM

## 2021-05-18 RX ORDER — DEXAMETHASONE 4 MG/1
4 TABLET ORAL ONCE
Status: COMPLETED | OUTPATIENT
Start: 2021-05-18 | End: 2021-05-18

## 2021-05-18 RX ORDER — ONDANSETRON 4 MG/1
4 TABLET, ORALLY DISINTEGRATING ORAL EVERY 8 HOURS PRN
Qty: 10 TABLET | Refills: 0 | Status: SHIPPED | OUTPATIENT
Start: 2021-05-18

## 2021-05-18 RX ORDER — DOCUSATE SODIUM 100 MG/1
100 CAPSULE, LIQUID FILLED ORAL 2 TIMES DAILY
Qty: 30 CAPSULE | Refills: 0 | Status: SHIPPED | OUTPATIENT
Start: 2021-05-18 | End: 2021-07-08

## 2021-05-18 RX ORDER — SODIUM CHLORIDE 9 MG/ML
INJECTION, SOLUTION INTRAVENOUS CONTINUOUS
Status: DISCONTINUED | OUTPATIENT
Start: 2021-05-18 | End: 2021-05-18 | Stop reason: HOSPADM

## 2021-05-18 RX ORDER — HYDROCODONE BITARTRATE AND ACETAMINOPHEN 5; 325 MG/1; MG/1
1 TABLET ORAL EVERY 4 HOURS PRN
Qty: 12 TABLET | Refills: 0 | Status: SHIPPED | OUTPATIENT
Start: 2021-05-18 | End: 2021-05-21

## 2021-05-18 RX ORDER — ONDANSETRON 2 MG/ML
4 INJECTION INTRAMUSCULAR; INTRAVENOUS
Status: DISCONTINUED | OUTPATIENT
Start: 2021-05-18 | End: 2021-05-18 | Stop reason: HOSPADM

## 2021-05-18 RX ORDER — OXYCODONE HYDROCHLORIDE AND ACETAMINOPHEN 5; 325 MG/1; MG/1
1 TABLET ORAL PRN
Status: COMPLETED | OUTPATIENT
Start: 2021-05-18 | End: 2021-05-18

## 2021-05-18 RX ORDER — ACETAMINOPHEN 500 MG
1000 TABLET ORAL ONCE
Status: COMPLETED | OUTPATIENT
Start: 2021-05-18 | End: 2021-05-18

## 2021-05-18 RX ORDER — ONDANSETRON 2 MG/ML
4 INJECTION INTRAMUSCULAR; INTRAVENOUS ONCE
Status: COMPLETED | OUTPATIENT
Start: 2021-05-18 | End: 2021-05-18

## 2021-05-18 RX ORDER — IBUPROFEN 600 MG/1
600 TABLET ORAL EVERY 6 HOURS PRN
Qty: 20 TABLET | Refills: 0 | Status: SHIPPED | OUTPATIENT
Start: 2021-05-18 | End: 2021-06-10 | Stop reason: ALTCHOICE

## 2021-05-18 RX ORDER — LIDOCAINE HYDROCHLORIDE 10 MG/ML
INJECTION, SOLUTION EPIDURAL; INFILTRATION; INTRACAUDAL; PERINEURAL PRN
Status: DISCONTINUED | OUTPATIENT
Start: 2021-05-18 | End: 2021-05-18 | Stop reason: SDUPTHER

## 2021-05-18 RX ORDER — BUPIVACAINE HYDROCHLORIDE AND EPINEPHRINE 5; 5 MG/ML; UG/ML
INJECTION, SOLUTION EPIDURAL; INTRACAUDAL; PERINEURAL PRN
Status: DISCONTINUED | OUTPATIENT
Start: 2021-05-18 | End: 2021-05-18 | Stop reason: ALTCHOICE

## 2021-05-18 RX ORDER — NEOSTIGMINE METHYLSULFATE 5 MG/5 ML
SYRINGE (ML) INTRAVENOUS PRN
Status: DISCONTINUED | OUTPATIENT
Start: 2021-05-18 | End: 2021-05-18 | Stop reason: SDUPTHER

## 2021-05-18 RX ADMIN — PROPOFOL 50 MG: 10 INJECTION, EMULSION INTRAVENOUS at 08:20

## 2021-05-18 RX ADMIN — ONDANSETRON 4 MG: 2 INJECTION INTRAMUSCULAR; INTRAVENOUS at 07:19

## 2021-05-18 RX ADMIN — DEXAMETHASONE 4 MG: 4 TABLET ORAL at 07:15

## 2021-05-18 RX ADMIN — KETAMINE HYDROCHLORIDE 10 MG: 10 INJECTION INTRAMUSCULAR; INTRAVENOUS at 09:59

## 2021-05-18 RX ADMIN — Medication 4 MG: at 11:33

## 2021-05-18 RX ADMIN — Medication 12.5 MG: at 09:02

## 2021-05-18 RX ADMIN — CELECOXIB 200 MG: 100 CAPSULE ORAL at 07:15

## 2021-05-18 RX ADMIN — GABAPENTIN 300 MG: 300 CAPSULE ORAL at 07:14

## 2021-05-18 RX ADMIN — PROPOFOL 150 MG: 10 INJECTION, EMULSION INTRAVENOUS at 08:32

## 2021-05-18 RX ADMIN — SODIUM CHLORIDE, POTASSIUM CHLORIDE, SODIUM LACTATE AND CALCIUM CHLORIDE: 600; 310; 30; 20 INJECTION, SOLUTION INTRAVENOUS at 11:10

## 2021-05-18 RX ADMIN — SODIUM CHLORIDE, POTASSIUM CHLORIDE, SODIUM LACTATE AND CALCIUM CHLORIDE: 600; 310; 30; 20 INJECTION, SOLUTION INTRAVENOUS at 07:15

## 2021-05-18 RX ADMIN — LIDOCAINE HYDROCHLORIDE 50 MG: 10 INJECTION, SOLUTION EPIDURAL; INFILTRATION; INTRACAUDAL; PERINEURAL at 08:20

## 2021-05-18 RX ADMIN — ROCURONIUM BROMIDE 50 MG: 10 INJECTION INTRAVENOUS at 08:32

## 2021-05-18 RX ADMIN — DEXAMETHASONE SODIUM PHOSPHATE 5 MG: 10 INJECTION INTRAMUSCULAR; INTRAVENOUS at 09:04

## 2021-05-18 RX ADMIN — ACETAMINOPHEN 1000 MG: 500 TABLET ORAL at 07:15

## 2021-05-18 RX ADMIN — MORPHINE SULFATE 2 MG: 2 INJECTION, SOLUTION INTRAMUSCULAR; INTRAVENOUS at 12:44

## 2021-05-18 RX ADMIN — FENTANYL CITRATE 50 MCG: 50 INJECTION, SOLUTION INTRAMUSCULAR; INTRAVENOUS at 08:20

## 2021-05-18 RX ADMIN — Medication 74 MCG: at 11:33

## 2021-05-18 RX ADMIN — PHENYLEPHRINE HYDROCHLORIDE 50 MCG: 10 INJECTION INTRAVENOUS at 09:28

## 2021-05-18 RX ADMIN — KETAMINE HYDROCHLORIDE 30 MG: 10 INJECTION INTRAMUSCULAR; INTRAVENOUS at 09:01

## 2021-05-18 RX ADMIN — OXYCODONE HYDROCHLORIDE AND ACETAMINOPHEN 1 TABLET: 5; 325 TABLET ORAL at 13:27

## 2021-05-18 RX ADMIN — MIDAZOLAM HYDROCHLORIDE 2 MG: 1 INJECTION, SOLUTION INTRAMUSCULAR; INTRAVENOUS at 08:03

## 2021-05-18 RX ADMIN — ROCURONIUM BROMIDE 20 MG: 10 INJECTION INTRAVENOUS at 10:02

## 2021-05-18 RX ADMIN — KETAMINE HYDROCHLORIDE 10 MG: 10 INJECTION INTRAMUSCULAR; INTRAVENOUS at 10:46

## 2021-05-18 RX ADMIN — ENOXAPARIN SODIUM 40 MG: 40 INJECTION SUBCUTANEOUS at 07:20

## 2021-05-18 RX ADMIN — CEFAZOLIN SODIUM 2000 MG: 2 INJECTION, SOLUTION INTRAVENOUS at 08:45

## 2021-05-18 RX ADMIN — ONDANSETRON 4 MG: 2 INJECTION INTRAMUSCULAR; INTRAVENOUS at 11:30

## 2021-05-18 RX ADMIN — FENTANYL CITRATE 50 MCG: 50 INJECTION, SOLUTION INTRAMUSCULAR; INTRAVENOUS at 08:16

## 2021-05-18 ASSESSMENT — PULMONARY FUNCTION TESTS
PIF_VALUE: 24
PIF_VALUE: 30
PIF_VALUE: 5
PIF_VALUE: 15
PIF_VALUE: 19
PIF_VALUE: 15
PIF_VALUE: 19
PIF_VALUE: 4
PIF_VALUE: 30
PIF_VALUE: 5
PIF_VALUE: 29
PIF_VALUE: 26
PIF_VALUE: 16
PIF_VALUE: 3
PIF_VALUE: 25
PIF_VALUE: 29
PIF_VALUE: 15
PIF_VALUE: 24
PIF_VALUE: 30
PIF_VALUE: 19
PIF_VALUE: 24
PIF_VALUE: 21
PIF_VALUE: 30
PIF_VALUE: 15
PIF_VALUE: 2
PIF_VALUE: 24
PIF_VALUE: 25
PIF_VALUE: 25
PIF_VALUE: 24
PIF_VALUE: 5
PIF_VALUE: 20
PIF_VALUE: 30
PIF_VALUE: 30
PIF_VALUE: 25
PIF_VALUE: 16
PIF_VALUE: 1
PIF_VALUE: 25
PIF_VALUE: 29
PIF_VALUE: 24
PIF_VALUE: 15
PIF_VALUE: 30
PIF_VALUE: 24
PIF_VALUE: 30
PIF_VALUE: 16
PIF_VALUE: 29
PIF_VALUE: 30
PIF_VALUE: 19
PIF_VALUE: 19
PIF_VALUE: 16
PIF_VALUE: 16
PIF_VALUE: 15
PIF_VALUE: 11
PIF_VALUE: 30
PIF_VALUE: 25
PIF_VALUE: 24
PIF_VALUE: 20
PIF_VALUE: 24
PIF_VALUE: 30
PIF_VALUE: 15
PIF_VALUE: 16
PIF_VALUE: 1
PIF_VALUE: 16
PIF_VALUE: 26
PIF_VALUE: 24
PIF_VALUE: 3
PIF_VALUE: 24
PIF_VALUE: 15
PIF_VALUE: 24
PIF_VALUE: 25
PIF_VALUE: 3
PIF_VALUE: 25
PIF_VALUE: 15
PIF_VALUE: 23
PIF_VALUE: 25
PIF_VALUE: 30
PIF_VALUE: 15
PIF_VALUE: 10
PIF_VALUE: 30
PIF_VALUE: 30
PIF_VALUE: 16
PIF_VALUE: 15
PIF_VALUE: 4
PIF_VALUE: 24
PIF_VALUE: 25
PIF_VALUE: 24
PIF_VALUE: 25
PIF_VALUE: 20
PIF_VALUE: 26
PIF_VALUE: 30
PIF_VALUE: 5
PIF_VALUE: 25
PIF_VALUE: 18
PIF_VALUE: 3
PIF_VALUE: 29
PIF_VALUE: 25
PIF_VALUE: 15
PIF_VALUE: 15
PIF_VALUE: 29
PIF_VALUE: 25
PIF_VALUE: 25
PIF_VALUE: 24
PIF_VALUE: 29
PIF_VALUE: 8
PIF_VALUE: 20
PIF_VALUE: 15
PIF_VALUE: 22
PIF_VALUE: 25
PIF_VALUE: 28
PIF_VALUE: 15
PIF_VALUE: 30
PIF_VALUE: 20
PIF_VALUE: 25
PIF_VALUE: 25
PIF_VALUE: 24
PIF_VALUE: 24
PIF_VALUE: 30
PIF_VALUE: 16
PIF_VALUE: 25
PIF_VALUE: 3
PIF_VALUE: 21
PIF_VALUE: 5
PIF_VALUE: 16
PIF_VALUE: 21
PIF_VALUE: 30
PIF_VALUE: 29
PIF_VALUE: 30
PIF_VALUE: 30
PIF_VALUE: 16
PIF_VALUE: 15
PIF_VALUE: 24
PIF_VALUE: 24
PIF_VALUE: 1

## 2021-05-18 ASSESSMENT — PAIN SCALES - GENERAL
PAINLEVEL_OUTOF10: 0
PAINLEVEL_OUTOF10: 6
PAINLEVEL_OUTOF10: 0
PAINLEVEL_OUTOF10: 4
PAINLEVEL_OUTOF10: 9
PAINLEVEL_OUTOF10: 0

## 2021-05-18 ASSESSMENT — LIFESTYLE VARIABLES: SMOKING_STATUS: 0

## 2021-05-18 NOTE — ANESTHESIA PRE PROCEDURE
Department of Anesthesiology  Preprocedure Note       Name:  Caryn Ortiz   Age:  30 y.o.  :  1985                                          MRN:  [id number]         Date:  2021      Surgeon: Yoselyn Reyes):  DO Fe Amanda MD    Procedure: Procedure(s):  XI ROBOTIC LAPAROSCOPY, LYSIS OF ADHESIONS, POSSIBLE SMALL BOWEL RESECTION  XI ROBOTIC SALPINGO OOPHORECTOMY LEFT, SALPINGECTOMY RIGHT, POSSIBLE HYSTERECTOMY, POSS. RIGHT OOPHERECTOMY, POSSIBLE LAP, STAGING BIOPSIES,  ** SHORT STAY**    Medications prior to admission:   Prior to Admission medications    Medication Sig Start Date End Date Taking?  Authorizing Provider   vitamin D (CHOLECALCIFEROL) 41394 UNIT CAPS cholecalciferol (vitamin D3) 1,250 mcg (50,000 unit) capsule   take 1 capsule by mouth TWICE WEEKLY   Yes Historical Provider, MD   acetaminophen (TYLENOL) 500 MG tablet Take 500 mg by mouth as needed for Pain   Yes Historical Provider, MD   Multiple Vitamins-Minerals (HM MULTIVITAMIN ADULT GUMMY) CHEW Take by mouth Takes 2-3 times a week   Yes Historical Provider, MD   medroxyPROGESTERone (DEPO-PROVERA) 150 MG/ML injection Inject 1 mL into the muscle every 3 months 21  Yes NOMAN Cano CNM   ustekinumab (STELARA) 90 MG/ML SOSY prefilled syringe 90 mg    Historical Provider, MD       Current medications:    Current Facility-Administered Medications   Medication Dose Route Frequency Provider Last Rate Last Admin    lactated ringers infusion 1,000 mL  1,000 mL Intravenous Continuous Beka Smiley MD        0.9 % sodium chloride infusion   Intravenous Continuous Elisa Lazo PA-C        0.9 % sodium chloride infusion  25 mL Intravenous PRN Elisa Lazo PA-C        ceFAZolin (ANCEF) 2000 mg in dextrose 5 % 50 mL IVPB  2,000 mg Intravenous [facility] to [facility]        enoxaparin (LOVENOX) injection 40 mg  40 mg Subcutaneous Once LEV Kumari        sodium chloride flush 0.9 % injection 10 mL  10 mL Intravenous 2 times per day Polo Valera PA-C        sodium chloride flush 0.9 % injection 10 mL  10 mL Intravenous PRN Elisa Lazo PA-C        acetaminophen (TYLENOL) tablet 1,000 mg  1,000 mg Oral Once Polo Valera PA-C        celecoxib (CELEBREX) capsule 200 mg  200 mg Oral Once Polo Valera PA-C        dexamethasone (DECADRON) tablet 4 mg  4 mg Oral Once Polo Valera PA-C        gabapentin (NEURONTIN) capsule 300 mg  300 mg Oral Once Polo Valera PA-C        ondansetron (ZOFRAN) injection 4 mg  4 mg Intravenous Once Polo Valera PA-C           Allergies:     Allergies   Allergen Reactions    Mucinex [Guaifenesin Er] Anaphylaxis    Robitussin (Alcohol Free) [Guaifenesin] Anaphylaxis    Claritin [Loratadine]     Loratadine     Infliximab Nausea And Vomiting     Tingling all over body       Problem List:    Patient Active Problem List   Diagnosis Code    Crohn's disease ([facility] [address] K50.90    ASCUS with positive high risk HPV [id number]    Depression with anxiety F41.8    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, 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    Chlamydia A74.9    Cyst of left ovary N83.202    Elevated LFTs R79.89    Ovarian mass N83.8    Atypical squamous cells of undetermined significance (ASCUS) on Papanicolaou smear of cervix R87.610       Past Medical History:        Diagnosis Date    Abnormal Pap smear of cervix     ASCUS with positive high risk HPV 2013    Crohn's disease ([facility] [address]     Elevated LFTs     Fracture     Nose fracture no surgery    Headache     Hearing loss     Hypoglycemia     Ovarian cyst     Left    Personal history of other medical treatment     OVA1 elevated markers    Wellness examination 05/10/2021    PCP: Myra Mchugh PA-C, last visit 4/29/2021    Wellness examination 05/10/2021    [facility]    Wellness examination 05/10/2021    Dr. Barbara Bedoya, [facility], 4/2021    Wellness examination 05/10/2021 CNM: Yoly Conner, [facility], last visit 2021       Past Surgical History:        Procedure Laterality Date    CHOLECYSTECTOMY, LAPAROSCOPIC  09/30/2019    CHOLECYSTECTOMY, LAPAROSCOPIC N/A 9/30/2019    XI LAPAROSCOPIC ROBOTIC CHOLECYSTECTOMY, FIREFLY performed by Dawson Bunn IV, DO at [address], COLON, DIAGNOSTIC      HEMICOLECTOMY      LEEP  08/29/2006    TONSILLECTOMY AND ADENOIDECTOMY      TOOTH EXTRACTION  12/2015    TYMPANOSTOMY TUBE PLACEMENT         Social History:    Social History     Tobacco Use    Smoking status: Former Smoker     Quit date: 10/2/2010     Years since quitting: 10.6    Smokeless tobacco: Never Used   Substance Use Topics    Alcohol use: Yes     Comment: occasional                                Counseling given: Not Answered      Vital Signs (Current):   Vitals:    05/18/21 0635   Weight: 205 lb (93 kg)   Height: 5' 4\" (1.626 m)                                              BP Readings from Last 3 Encounters:   05/10/21 126/82   04/14/21 130/80   04/12/21 (!) 147/98       NPO Status: Time of last liquid consumption: 2330                        Time of last solid consumption: 2100                        Date of last liquid consumption: 05/17/21                        Date of last solid food consumption: 05/15/21    BMI:   Wt Readings from Last 3 Encounters:   05/18/21 205 lb (93 kg)   05/10/21 210 lb (95.3 kg)   04/14/21 208 lb (94.3 kg)     Body mass index is 35.19 kg/m².     CBC:   Lab Results   Component Value Date    WBC 7.4 05/10/2021    RBC 4.67 05/10/2021    RBC 4.76 03/06/2017    HGB 14.8 05/10/2021    HCT 44.9 05/10/2021    MCV 96.1 05/10/2021    RDW 13.0 05/10/2021     05/10/2021     03/21/2012       CMP:   Lab Results   Component Value Date     05/10/2021    K 4.1 05/10/2021     05/10/2021    CO2 22 05/10/2021    BUN 14 05/10/2021    CREATININE 0.62 05/10/2021    GFRAA >60 05/10/2021 LABGLOM >60 05/10/2021    GLUCOSE 84 05/10/2021    GLUCOSE 81 03/06/2017    PROT 8.0 05/10/2021    PROT 7.3 06/28/2017    CALCIUM 9.6 05/10/2021    BILITOT 0.42 05/10/2021    ALKPHOS 81 05/10/2021    AST 67 05/10/2021     05/10/2021       POC Tests: No results for input(s): POCGLU, POCNA, POCK, POCCL, POCBUN, POCHEMO, POCHCT in the last 72 hours. Coags: No results found for: PROTIME, INR, APTT    HCG (If Applicable):   Lab Results   Component Value Date    PREGTESTUR negative 04/12/2021    HCG NEGATIVE 09/30/2019    HCGQUANT <1 10/31/2019        ABGs: No results found for: PHART, PO2ART, FJS1IMJ, NJN8PTQ, BEART, D8EKNETG     Type & Screen (If Applicable):  No results found for: LABABO, LABRH    Drug/Infectious Status (If Applicable):  Lab Results   Component Value Date    HEPCAB NONREACTIVE 11/21/2019       COVID-19 Screening (If Applicable):   Lab Results   Component Value Date    COVID19 Not Detected 05/14/2021           Anesthesia Evaluation   no history of anesthetic complications:   Airway: Mallampati: III     Neck ROM: full   Dental:          Pulmonary:       (-) recent URI and not a current smoker                           Cardiovascular:  Exercise tolerance: good (>4 METS),                     Neuro/Psych:   (+) headaches: migraine headaches, depression/anxiety    (-) seizures           GI/Hepatic/Renal:        (-) GERD      ROS comment: Crohns. Endo/Other:        (-) diabetes mellitus               Abdominal:           Vascular:                                        Anesthesia Plan      general     ASA 2       Induction: intravenous.                           Curtis Gonzalez MD   5/18/2021

## 2021-05-18 NOTE — ANESTHESIA POSTPROCEDURE EVALUATION
Department of Anesthesiology  Postprocedure Note    Patient: Kristen Herbert  MRN: [id number]  YOB: 1985  Date of evaluation: 5/18/2021  Time:  2:00 PM     Procedure Summary     Date: 05/18/21 Room / Location: [address]    Anesthesia Start: 0800 Anesthesia Stop: 1205    Procedures:       DIAGNOSTIC LAPAROSCOPY, ROBOTIC ASSISTED LYSIS OF ADHESIONS (N/A )      XI ROBOTIC BILATERAL SALPINGECTOMY, LEFT OOPHORECTOMY, PERITONEAL WASHINGS FOR CYTOLOGY (N/A ) Diagnosis: (CROHNS DISEASE, COMPLEX PELVIC MASS)    Surgeons: [facility] Sepideh ROMEO DO; Mary Anne Harkins MD Responsible Provider: Farhad Mccormick MD    Anesthesia Type: general ASA Status: 2          Anesthesia Type: general    Rui Phase I: Rui Score: 10    Rui Phase II:      Last vitals: Reviewed and per EMR flowsheets.        Anesthesia Post Evaluation    Patient location during evaluation: PACU  Patient participation: complete - patient participated  Level of consciousness: awake and alert  Pain score: 3  Airway patency: patent  Nausea & Vomiting: no vomiting and no nausea  Complications: no  Cardiovascular status: hemodynamically stable  Respiratory status: acceptable  Hydration status: stable

## 2021-05-18 NOTE — BRIEF OP NOTE
Brief Operative Note  Department of Obstetrics and Gynecology  [facility]     Patient: Glory Castañeda   : 1985  MRN: [id number]       Acct: [de-identified]   Date of Procedure: 21     Pre-operative Diagnosis: 30 y.o. female     Left Ovarian Cyst w/ Elevated  Tumor Markers      Crohns Disease      Hx Bowl Resection      ASCUS      Post-operative Diagnosis: same as above     Procedure: Robotic Assisted lysis of adhesions, left salping oophorectomy, right salpingectomy, cytologic washings     Surgeon: Dr Barbara Bocanegra      Assistant(s): Bijan Rodger PGY3, Kylah Fuchs PGY2, [facility]    Anesthesia: general    Findings:  Omental adhesions, normal appearing uterus, right fallopian tube adhered to right ovary, normal appearing right ovary, enlarged left ovary with large cystic appearing mass, normal appearing left fallopian tube, no abnormal lesions in posterior cul-de-sac or along pelvic side walls   Total IV fluids/Blood products:  1000 ml crystalloid  Urine Output:  55 mL    Estimated blood loss:  <20 mL  Drains:  none  Specimens:  Cytologic washings, cystic fluid from left ovarian cyst, bilateral fallopian tubes, left ovary   Instrument and Sponge Count: Correct  Complications:  none  Condition:  stable, transferred to post anesthesia recovery    See full operative report for further details. Bijan Soares, DO  Ob/Gyn Resident    2021, 11:44 AM        Attending Physician Statement  I have discussed the care of Glory Castañeda, including pertinent history and exam findings,  with the resident. I have seen and examined the patient and the key elements of all parts of the encounter have been performed by me. I agree with the assessment, plan and orders as documented by the resident.      Gibson Frankel, MD

## 2021-05-18 NOTE — OP NOTE
Operative Note      Patient: Silke Nava  YOB: 1985  MRN: [id number]    Date of Procedure: 5/18/2021    Pre-Op Diagnosis:   1. Large left ovarian cyst  2. Elevated tumor markers   3. Crohn's disease  4. S/p remote right hemicolectomy for perforated viscous (due to Crohn's flare)    Post-Op Diagnosis:   1. Large left ovarian cyst (w/ elevated tumor markers)  2. Mild pelvic adhesions  3. Crohn's disease  4. S/p Right hemicolectomy with ileocolonic anastomosis    Procedures:    Our portion (General Surgery)-   1. Diagnostic laparoscopy  2. Robotic assisted laparoscopic lysis of adhesions    Gynecology/Onc portion (see their separate op note please)  1. Robotic bilateral salpingectomy  2. Left oophorectomy  3. Peritoneal washings for cytology       Surgeon:  Annmarie Reddy DO (General Surgery)  Michael Garcia MD (Gyn-Onc)    Assistant:  Sera Renee 41, DO, PGY-5 / Yudy Reynoso DO, PGY-4 / Joe Brown DO, PGY-1    Gyn/Onc portion - [place] [id number], DO; [facility]; Viviane Galvan PA-C    Anesthesia: General    Estimated Blood Loss (mL): <45QH    Complications: None    Specimens: Obtained during Dr. Kavon Ruiz portion of the procedure. Please see separate operative dictation. Drains: None    Findings: Mild pelvic adhesions between omentum and the right ovary and fallopian tube as well as between omentum and pelvic sidewall. Adhesions lysed without complication. No active evidence of Crohn's. Massive left ovarian cyst noted. See Gyn-Onc note for details regarding resection. Indications: This is a 31 y/o female who presented to Dr. Osmany Tamayo with an enlarging, septated left ovarian cyst approximately 6.5cm in diameter when most recently imaged. Due to abdominal discomfort, increasing large left ovarian cyst, as well as slightly elevated tumor markers, they recommended proceeding with a salpingo-oophorectomy from a robotic approach.  We have been consulted due to the patient's history of Crohn's disease and concerns for adhesions between the bowel and pelvic anatomy. The patient has a hx of a remote right hemicolectomy with ileocolonic anastomosis for apparent perforated viscous secondary to a Crohn's flare years ago. Since then her disease process appears to be well controlled and she is without any evidence of active crohn's or malnutrition. We agreed to proceed with performing the initial diagnostic laparoscopy with robotic lysis of adhesions to free any adhesions between small bowel, colon, omentum and the ovaries/cyst/tubes and uterus to assist the Gyn-Onc team in their necessary resection. The risks, benefits, and alternatives were discussed with the patient. Perioperative preparation was discussed and all questions were answered. The patient expresses understanding of the intervention and consent was obtained with an RN witness. Detailed Description of Procedure: The patient was taken to the OR and placed in the supine position. General endotracheal anesthesia was induced. A time out was performed confirming all personnel present, the patient ID and the proper procedure to be performed. Antibiotics were given in accordance with SCIP. EPC cuffs placed for DVT ppx and a sean hugger to keep the patient euthermic. The patient was then placed in lithotomy position in yellow fins and a rodrigez catheter was placed per the circulating nurse in the OR. The abdomen was then prepped and draped in a usual sterile fashion for robotic laparoscopic abdominal operation. We first made 5mm incision in the LUQ at corea's point, and Veress needle was inserted into the peritoneal cavity at this location. It was confirmed with a saline drop test and insufflation established to 15mmHg. We then entered the peritoneal cavity through this incision with a 5mm Optiview trocar without complication. The laparoscope was inserted and the peritoneal cavity was surveyed.  The greater omentum was draped over the bowel and colon and appeared to be healthy without inflammatory changes. There were no adhesions to the anterior abdominal wall. A very large left ovarian cyst was noted in the LLQ/pelvis and both the large left ovarian cystic mass and uterus were free from any adhesions. The small bowel was also noted to be adequate throughout without any evidence of active crohn's disease. The colon appeared healthy and there was no adhesions between the uterus/ovaries and the sigmoid colon and rectum. We did note some adhesions between the omentum and the right ovary and fallopian tube. In order to better evaluate the pelvis and due to the fact that Gyn-Onc would be proceeding with robotic procedure, we placed our additional working 8mm trocars in the peritoneal cavity. All trocars through 8mm incisions, one in the supraumbilical location and the other two right and left lateral to the supraumbilical incision spaced approximately 10cm apart. The patient was then placed in trendelenburg position and the Da Giorgi XI robot was docked. With a fenestrated bipolar in my left arm and prograsp in the right arm we began with additional diagnostic laparoscopy. No additional adhesions or concerns noted and so with monopolar scissor cautery we divided the adhesions between the omentum and the anterior/superior surface of the right ovary and fallopian tube. We then divided the adhesions between the right lower lateral edge of omentum and the right pelvic sidewall and once this was performed, hemostasis was confirmed and we evaluated all areas prior to completing our portion of the procedure. The large left ovary/cyst with fallopian tube was free of any adhesions and completely mobile. The right ovary/tube was free of adhesions now as well as the uterus. There were no adhesions between the uterus circumferentially and the sigmoid/rectum was preserved, as was all of the small bowel and omentum.  At this point we removed our Raytec gauze from the peritoneal cavity after confirming hemostasis and our portion of the procedure was complete. Please see Dr. Mayur Bauer dictation for the remainder of the surgical procedure at which time they performed a robotic assisted laparoscopic left salpingo-oophorectomy, right salpingectomy, and cytologic washings in the pelvis. We will plan to re-evaluate the patient on POD#1 and from our standpoint her diet could be slowly advanced as tolerated today as we did not disturb and of the small bowel/colon in the procedure. Findings discussed with Dr. Nell Caceres prior to him initiating his portion of the case.         Electronically signed by Ian Pan DO on 5/18/2021 at 12:03 PM

## 2021-05-19 LAB
CULTURE: NO GROWTH
Lab: NORMAL
SPECIMEN DESCRIPTION: NORMAL
SURGICAL PATHOLOGY REPORT: NORMAL
SURGICAL PATHOLOGY REPORT: NORMAL

## 2021-05-19 NOTE — OP NOTE
[address]                                OPERATIVE REPORT    PATIENT NAME: Jose Childs                  :        1985  MED REC NO:   [id number]                             ROOM:  ACCOUNT NO:   [de-identified]                           ADMIT DATE: 2021  PROVIDER:     Isabella Warner MD    DATE OF PROCEDURE:  2021    PREOPERATIVE DIAGNOSES:  Enlarging left ovarian mass and elevated tumor  markers CEA and CA-125 and history of Crohn's disease with bowel  resection. POSTOPERATIVE DIAGNOSES:  Enlarging left ovarian mass and elevated tumor  markers CEA and CA-125 and history of Crohn's disease with bowel  resection plus benign left ovarian mucinous cystadenoma and pelvic and  abdominal adhesions. OPERATION PERFORMED:  Robotic-assisted lysis of adhesions, left  salpingo-oophorectomy, right salpingectomy, and peritoneal washings for  cytology. SURGEON:  Isabella Warner MD    ASSISTANTS:  Dr. Neela Zhao; Dr. Juju James; Tressa Severin, PA-C; and Ramon third year medical student. ANESTHESIA:  General.    ESTIMATED BLOOD LOSS:  20 mL. FINDINGS:  The patient had pelvic adhesions and omental adhesions. Her  uterus appeared fairly normal.  The right fallopian tube was adherent to  the right ovary. There was an enlarged left ovary with multicystic  mass. Frozen section returned \"benign mucinous cystadenoma, left  ovary. \"    URINE OUTPUT:  55 mL. BLOOD PRODUCTS GIVEN:  None. DRAINS:  None. SPECIMENS:  Peritoneal washings for cytology, bilateral fallopian tubes,  and left ovary. INSTRUMENTS AND SPONGE COUNTS:  Correct. COMPLICATIONS:  None. CONDITION:  Stable. The patient transferred to post-anesthesia  recovery.     BRIEF HISTORY AND INDICATION FOR OPERATION:  The patient is a  70-year-old  3, para 1 female, who began having left pelvic  discomfort a little over a year ago. The discomfort has become more  severe. A pelvic ultrasound initially revealed a 5.5 cm cyst a year  ago. A repeat ultrasound was performed on 07/27/2020 and revealed a  normal endometrial stripe and a normal right ovary; however, the left  ovary enlarged to 6.5 x 6.4 x 6.2 cm. A CT of the abdomen and pelvis was performed on 07/27/2020 and confirmed  the left ovarian cystic mass. She continued to be followed and because  of continued pain had a pelvic MRI performed on 11/13/2020. This  confirmed a left ovarian cystic mass measuring 6.3 x 6.0 x 6.5 cm with  two thin septations. The MRI was repeated on 03/11/2021 and this revealed \"interval increase  in size of the large left ovarian cystic lesion. \"  It measured 6.8 x 6.4  x 7.9 cm at that time. In addition, there was a new hyperintense focus  in the medial aspect of the cyst measuring 2.5 x 1.0 x 2.2 cm. No free  fluid was noted. No other pathology was noted. Surgical consultation  was suggested. The patient was then referred to [facility]. Her CA-125 antigen level was slightly elevated at 68 units  and her OVA1 score was 3.1. A CEA was also elevated at 4.9 (less than  3.9 was normal). C-125 was repeated on 03/17/2021 and was elevated at 62 units. It was suggested that she have the mass removed. Because of her history of Crohn's disease with a left hemicolectomy and  reanastomosis, the decision was made to piggyback the case with General  Surgery going first for adhesiolysis of bowel followed by the  gynecologic procedure. Dr. Tanja Rodriguez, general surgeon, started the robotic procedure. Please see  his portion of the dictation. Her preoperative tumor markers were repeated on 05/10/2021 with the  CA-125 antigen still being elevated at 47 units (less than 38) and her  CEA was further elevated at 5.2 (less than 3.9).     DESCRIPTION OF OPERATIVE PROCEDURE:  Under adequate general anesthesia,  the patient was placed in the dorsal lithotomy position and prepped and  draped in the usual fashion. The trocars were placed by Dr. Nellie Vargas. Please see his portion of the dictation including the adhesiolysis. Upon entering the case, Dr. Nell Caceres placed an additional port in the  left lower abdomen and then performed peritoneal washings for cytology  with fluid collected from the pelvis and cul-de-sac. The right ovary appeared normal.  There were adhesions of the right  fallopian tube to the ovary. These were taken down using the cautery  scissors. The [place] bipolar forceps were utilized to cauterize the mesentery of  the right fallopian tube and it was then cut away with the cautery  scissors all the way down to its entrance into the uterus. Attention was then turned to the left ovarian mass. The left fallopian  tube was stretched over the surface of the mass. These were removed in  an en bloc fashion with tube and ovary. The mass was smooth on its external surface. There were no papillary  excrescences. It was not adherent to any surrounding structures. It  was removed without rupture (although it was aspirated in an Endopouch  bag). With traction toward the midline, the left infundibulopelvic ovarian  vascular bundle was cauterized with the [place] bipolar forceps and the  fenestrated bipolar forceps. The IP was then cut and the cautery  scissors were used take down the leaves of the broad ligament all the  way to the round ligament. With upward traction on the left side, we were able to cauterize and cut  the utero-ovarian ligament and the fallopian tube at its entrance to the  uterus. The specimen was then freed from the surrounding tissue. There  was minimal bleeding. It was then placed in a 10-cm bag and an aspiration needle was then used  to puncture the cyst in the bag in order to prevent spillage.    Approximately 80 mL of clear yellow fluid was removed with the  aspiration needle and later a spinal needle. The bag was then closed and brought out through the AirSeal port, which  was a 12-mm port. At this point, the bag was opened and a ring forceps was then utilized  to grasp the cyst and pull it out of the bag outside of the abdomen. The bag was then removed intact as well. The surgical site was re-explored. The cyst was submitted for frozen section and returned \"benign mucinous  cystadenoma. \"    With that information, we undocked the robot and straighten the patient  on the table. We then closed the AirSeal port under direct camera  guidance with a #1 Vicryl suture. The port sites were then lavaged copiously with a Betadine and saline  solution and were then closed with 4-0 Vicryl suture in a subcuticular  fashion. Dermabond was then placed on the wounds as a sealant and  dressing. The patient tolerated the surgery and the anesthesia well. She was then  taken to the recovery room in satisfactory condition.         Lorenza Mitchell MD    D: 05/18/2021 12:43:59       T: 05/18/2021 12:53:48     [email or web address]  Job#: [id number]     Doc#: [id number]    CC:

## 2021-05-27 ENCOUNTER — OFFICE VISIT (OUTPATIENT)
Dept: GYNECOLOGIC ONCOLOGY | Age: 36
End: 2021-05-27

## 2021-05-27 VITALS
TEMPERATURE: 97.6 F | HEIGHT: 64 IN | BODY MASS INDEX: 36.57 KG/M2 | WEIGHT: 214.2 LBS | HEART RATE: 77 BPM | OXYGEN SATURATION: 96 %

## 2021-05-27 DIAGNOSIS — N83.202 CYST OF LEFT OVARY: Primary | ICD-10-CM

## 2021-05-27 PROCEDURE — 99024 POSTOP FOLLOW-UP VISIT: CPT | Performed by: OBSTETRICS & GYNECOLOGY

## 2021-05-27 ASSESSMENT — ENCOUNTER SYMPTOMS
ROS SKIN COMMENTS: HEALING ABDOMINAL INCISIONS
BACK PAIN: 1
ABDOMINAL PAIN: 1

## 2021-05-27 NOTE — PROGRESS NOTES
[address] [place] [address], [facility], [address] [address]    Caryn Ortiz is a 30 y.o. female who presents for a Post Operative visit today following her surgery of.  2021. CC: Left ovarian cyst    HPI: Peña Preciado is a 69-year-old  3, para 1 female who developed left pelvic discomfort more than a year ago. The discomfort has become more severe. A pelvic ultrasound initially revealed a 5.5 cm left ovarian cyst.  The patient has had several repeat ultrasounds. The left ovary did enlarge to 6.5 x 6.4 x 6.2 cm on an ultrasound on 2020. Following this a CT scan of the abdomen and pelvis was performed on 2020. This confirmed a left ovarian cyst.    Because of the increasing pain a pelvic MRI was ordered on 2020. At that time the cyst measured 6.3 x 6.0 x 6.5 cm and contained 2 thin septations which had not been previously noted. The MRI was repeated on 3/11/2021. The cyst had enlarged further to 7.9 cm x 6.8 x 6.4. There was a new finding of a \"new hyperintense focus in the medial aspect of the cyst measuring 2.5 x 1.0 x 2.2 cm. No free fluid was noted. Surgical consultation was requested. She was referred to [facility] gynecologic oncology. A Ca1 25 antigen level was slightly elevated at 68 units. An ova 1 score was 3.1. A CEA antigen was also elevated at 4.9 (less than 3.9 is normal). A repeat CEA was further elevated at 5.2. The recommendations were for the patient to have the mass removed. The patient also had had a history of Crohn's disease with a previous left hemicolectomy and reanastomosis. General surgery was consulted to begin the case for adhesiolysis in case there was bowel involvement. Dr. John Lopez was consulted. Patient was taken to surgery on 2021.   Following general surgery's adhesiolysis, peritoneal washings were performed for cytology and a left salpingo-oophorectomy was performed along with a right salpingectomy. The right ovary was left behind. There was no evidence of metastatic disease. Frozen section returned benign \"left ovarian mucinous cystadenoma\". Patient has done fairly well postoperatively. She is ambulating and eating well. She is having no difficulty with vaginal bleeding, urination or bowel movements. She has however had \"night sweats\". Her pain is mild and she is taking only ibuprofen as needed. ROS:  I have personally reviewed and agree with the review of systems done by my ancillary staff in the [facility] documentation. Objective:  Vitals:    05/27/21 1409   Pulse: 77   Temp: 97.6 °F (36.4 °C)   TempSrc: Temporal   SpO2: 96%   Weight: 214 lb 3.2 oz (97.2 kg)   Height: 5' 4\" (1.626 m)       Physical Exam:      General: Patient is in no distress. Abdomen: Multiple laparoscopic incision sites are noted. There is no evidence of cellulitis. There is no evidence of herniation. Palpation reveals a moderate amount of induration beneath the wound surfaces. The larger 12 mm air seal port in the right upper quadrant bothers her more than the rest and there is a moderate to large amount of induration beneath the surface. No evidence of infection. Assessment:    Enlarging left ovarian mucinous cystadenoma, benign. Now status post robotic laparoscopic lysis of adhesions (general surgery) and left salpingo-oophorectomy and right salpingectomy. Post Operative Changes: Good thus far (patient is 9 days post surgery)    Discussed result with patients, and all questions were answered. Plan:  Patient is to continue her activity restrictions as before she is only 9 days post op. We will have her wait another week before she begins to drive. She is to watch for signs of infection and call us as needed. We will see her again in approximately 5- 6 weeks for final postoperative checkup.           Electronically signed by Juan Ramon Perry MD on 5/27/21 at 2:28 PM EDT

## 2021-05-27 NOTE — PROGRESS NOTES
Review of Systems   Gastrointestinal: Positive for abdominal pain (sharp pain by incision site). Endocrine: Positive for hot flashes. Genitourinary: Positive for vaginal bleeding (when she wipes spotting). Musculoskeletal: Positive for back pain. Skin:        Healing abdominal incisions   Neurological: Positive for dizziness (when on feet to long ). All other systems reviewed and are negative.

## 2021-06-08 ENCOUNTER — PATIENT MESSAGE (OUTPATIENT)
Dept: GYNECOLOGIC ONCOLOGY | Age: 36
End: 2021-06-08

## 2021-06-08 NOTE — TELEPHONE ENCOUNTER
Pt states she is having pain rt upper incision site rates as 5.5/10 same area as last office visit. States pain went away and then came back the last couple of days. Worse yesterday. She is taking Tylenol for pain. Denies any fever chills,nausea or vomiting. States she is drinking plenty of fluids and bowels are moving normally. Offered to be seen tomorrow at [facility] location. Pt declined and would like appt on 6/10/21 at [facility] location. Notified to call with any concerns or if she would like to be seen tomorrow. She voiced undersatnding.

## 2021-06-10 ENCOUNTER — OFFICE VISIT (OUTPATIENT)
Dept: GYNECOLOGIC ONCOLOGY | Age: 36
End: 2021-06-10

## 2021-06-10 VITALS — SYSTOLIC BLOOD PRESSURE: 122 MMHG | HEART RATE: 82 BPM | DIASTOLIC BLOOD PRESSURE: 78 MMHG | OXYGEN SATURATION: 95 %

## 2021-06-10 DIAGNOSIS — N83.8 OVARIAN MASS: ICD-10-CM

## 2021-06-10 DIAGNOSIS — G89.18 POST-OP PAIN: Primary | ICD-10-CM

## 2021-06-10 PROCEDURE — 99024 POSTOP FOLLOW-UP VISIT: CPT | Performed by: OBSTETRICS & GYNECOLOGY

## 2021-06-10 RX ORDER — ACETAMINOPHEN AND CODEINE PHOSPHATE 300; 30 MG/1; MG/1
1 TABLET ORAL
Qty: 20 TABLET | Refills: 0 | Status: SHIPPED | OUTPATIENT
Start: 2021-06-10 | End: 2021-06-15

## 2021-06-10 RX ORDER — IBUPROFEN 600 MG/1
600 TABLET ORAL 4 TIMES DAILY PRN
Qty: 120 TABLET | Refills: 0 | Status: SHIPPED | OUTPATIENT
Start: 2021-06-10 | End: 2021-07-10

## 2021-06-10 RX ORDER — CYCLOBENZAPRINE HCL 5 MG
5 TABLET ORAL 2 TIMES DAILY PRN
Qty: 20 TABLET | Refills: 0 | Status: SHIPPED | OUTPATIENT
Start: 2021-06-10 | End: 2021-06-20

## 2021-06-10 ASSESSMENT — ENCOUNTER SYMPTOMS
ROS SKIN COMMENTS: HEALING ABDOMINAL INCISIONS
ABDOMINAL PAIN: 1

## 2021-06-10 NOTE — PATIENT INSTRUCTIONS
May use hot water bottle or heat right upper quadrant (on low)    May use Motrin 600 mg every 6 to 8 hours as needed for pain. May use Tylenol 3 one or two every 4-6 hours as needed for pain. If no better in 1 to 2 weeks call and we will order further testing. Return for your regularly scheduled postoperative visits.

## 2021-06-10 NOTE — PROGRESS NOTES
[address] [facility], [facility], [address] [address]    Nydia Sharif is a 59 [facility] y.o. female who presents for a Post Operative visit today following her surgery of 2021    CC: Left pelvic mass. Right upper quadrant postop pain    HPI: Shiva palacio is a 52-year-old  three, para three female who was found to have a 5.5 cm cyst on a pelvic ultrasound approximately 1 year ago. She continued to have some pelvic discomfort. A repeat ultrasound was performed on 2020 and revealed a normal endometrial stripe and normal right ovary, however the left ovary had enlarged further to 6.5 x 6.4 x 6.2 cm. A CT scan of the abdomen and pelvis was performed on 2020 and confirmed a left ovarian cystic, complex mass. She continued to have more pain in the pelvic MRI was performed on 2020. This confirmed a left ovarian cystic mass measuring 6.3 x 6.0 x 6.5 cm with two thin septations. The MRI was repeated on 3/7/2021 and revealed \"interval increase in the size of a large left ovarian cystic lesion\" it measured 6.8 x 6.4 x 7.9. Surgical consultation was suggested. The patient was then referred to [address] [place] [address] gynecologic oncology. A Ca1 25 antigen level was drawn and was slightly elevated at 68 units. Her CEA was also slightly elevated at 4.9. It was suggested that the patient have the mass removed. Because of her history of Crohn's disease with a left hemicolectomy and reanastomosis. The decision was made to have general surgery perform the initial part of the surgery with adhesiolysis of bowel adhesions, etc. followed by the gynecologic procedure to remove the left ovary. Dr. Sisi Justin, general surgeon began the robotic procedure and lysed adhesions in the pelvis, primarily on the right side. Dr. Ferro Adjutant then entered the case. The right ovary appeared normal.  There were adhesions primarily on the right side that were previously lysed.   A right salpingoectomy was performed. A left salpingo-oophorectomy was then performed. A frozen section returned \"benign mucinous cystadenoma\". Postoperatively the patient was discharged. She has done well at home but continues to have right upper quadrant pain with \"shooting pains\" when I bend or turn. She was asked to come in for a postoperative visit. She has been only taking \"Children's Motrin\" for pain. She has nothing remaining for pain control. ROS:  I have personally reviewed and agree with the review of systems done by my ancillary staff in the [facility] documentation. Objective:  Vitals:    06/10/21 1308   BP: 122/78   Pulse: 82   SpO2: 95%       Physical Exam:    Examination of the abdomen was performed. She points specifically at the incision in the right upper quadrant which is just below the right costal margin over the area of the mid rectus. Palpation reveals only a small amount of induration beneath the surface. There is no evidence of cellulitis. There is no evidence of herniation. The other sites appear to be healing well. Assessment:    1. Benign mucinous cystadenoma of left ovary. Right pelvic adhesions. Status post right salpingectomy and left salpingooophorectomy. Status post lysis of abdominal and pelvic adhesions (Dr. Daron Zabala, general surgeon)    2. Postop incisional pain over right upper quadrant subcostal incision. Post Operative Changes: Good thus far    Discussed result with patients, and all questions were answered. Plan:    1. Patient may use a heating pad (low heat) over incision right upper quadrant. 2.  Patient may use Motrin 600 mg every 6-8 hours, as needed. 3.  Patient may use Tylenol 3 one or two p.o. every 4-6 hours as needed. 4.  Patient will call if no better in 1 to 2 weeks and we may order an abdominal pelvic CT scan with and without contrast.    Otherwise patient will return for her usual postoperative visits.     Follow Up Instructions: Postop visit    Continue activity restrictions for another 4  weeks.     Electronically signed by Andrew Benavides MD on 6/10/21 at 3:14 PM EDT

## 2021-06-10 NOTE — PROGRESS NOTES
Review of Systems   Gastrointestinal: Positive for abdominal pain (RUQ). Genitourinary: Positive for vaginal bleeding (spotting and tissue when she wiped).     Skin:        Healing abdominal incisions

## 2021-06-29 ENCOUNTER — HOSPITAL ENCOUNTER (OUTPATIENT)
Age: 36
Discharge: HOME | End: 2021-06-29
Payer: COMMERCIAL

## 2021-06-29 LAB
ALBUMIN SERPL-MCNC: 4.4 G/DL (ref 3.5–5.2)
ALBUMIN/GLOBULIN RATIO: 1.4 (ref 1–2.5)
ALP BLD-CCNC: 90 U/L (ref 35–104)
ALT SERPL-CCNC: 205 U/L (ref 5–33)
ANION GAP SERPL CALCULATED.3IONS-SCNC: 13 MMOL/L (ref 9–17)
AST SERPL-CCNC: 150 U/L
BILIRUB SERPL-MCNC: 0.81 MG/DL (ref 0.3–1.2)
BUN BLDV-MCNC: 9 MG/DL (ref 6–20)
BUN/CREAT BLD: ABNORMAL (ref 9–20)
CALCIUM SERPL-MCNC: 9.1 MG/DL (ref 8.6–10.4)
CHLORIDE BLD-SCNC: 104 MMOL/L (ref 98–107)
CHOLESTEROL, FASTING: 246 MG/DL
CHOLESTEROL/HDL RATIO: 5.2
CO2: 22 MMOL/L (ref 20–31)
CREAT SERPL-MCNC: 0.52 MG/DL (ref 0.5–0.9)
GFR AFRICAN AMERICAN: >60 ML/MIN
GFR NON-AFRICAN AMERICAN: >60 ML/MIN
GFR SERPL CREATININE-BSD FRML MDRD: ABNORMAL ML/MIN/{1.73_M2}
GFR SERPL CREATININE-BSD FRML MDRD: ABNORMAL ML/MIN/{1.73_M2}
GLUCOSE BLD-MCNC: 93 MG/DL (ref 70–99)
HCT VFR BLD CALC: 41.7 % (ref 36.3–47.1)
HDLC SERPL-MCNC: 47 MG/DL
HEMOGLOBIN: 14.1 G/DL (ref 11.9–15.1)
IRON: 196 UG/DL (ref 37–145)
LDL CHOLESTEROL: 124 MG/DL (ref 0–130)
MCH RBC QN AUTO: 31.7 PG (ref 25.2–33.5)
MCHC RBC AUTO-ENTMCNC: 33.8 G/DL (ref 28.4–34.8)
MCV RBC AUTO: 93.7 FL (ref 82.6–102.9)
NRBC AUTOMATED: 0 PER 100 WBC
PDW BLD-RTO: 13.5 % (ref 11.8–14.4)
PLATELET # BLD: 280 K/UL (ref 138–453)
PMV BLD AUTO: 10.2 FL (ref 8.1–13.5)
POTASSIUM SERPL-SCNC: 3.4 MMOL/L (ref 3.7–5.3)
RBC # BLD: 4.45 M/UL (ref 3.95–5.11)
SODIUM BLD-SCNC: 139 MMOL/L (ref 135–144)
TOTAL PROTEIN: 7.5 G/DL (ref 6.4–8.3)
TRIGLYCERIDE, FASTING: 377 MG/DL
VITAMIN B-12: 272 PG/ML (ref 232–1245)
VITAMIN D 25-HYDROXY: 22.7 NG/ML (ref 30–100)
VLDLC SERPL CALC-MCNC: ABNORMAL MG/DL (ref 1–30)
WBC # BLD: 7.7 K/UL (ref 3.5–11.3)

## 2021-06-29 PROCEDURE — 82306 VITAMIN D 25 HYDROXY: CPT

## 2021-06-29 PROCEDURE — 80053 COMPREHEN METABOLIC PANEL: CPT

## 2021-06-29 PROCEDURE — 82607 VITAMIN B-12: CPT

## 2021-06-29 PROCEDURE — 83540 ASSAY OF IRON: CPT

## 2021-06-29 PROCEDURE — 36415 COLL VENOUS BLD VENIPUNCTURE: CPT

## 2021-06-29 PROCEDURE — 85027 COMPLETE CBC AUTOMATED: CPT

## 2021-06-29 PROCEDURE — 80061 LIPID PANEL: CPT

## 2021-07-08 ENCOUNTER — OFFICE VISIT (OUTPATIENT)
Dept: GYNECOLOGIC ONCOLOGY | Age: 36
End: 2021-07-08

## 2021-07-08 VITALS
HEART RATE: 84 BPM | HEIGHT: 64 IN | WEIGHT: 215 LBS | TEMPERATURE: 96.8 F | DIASTOLIC BLOOD PRESSURE: 80 MMHG | OXYGEN SATURATION: 96 % | BODY MASS INDEX: 36.7 KG/M2 | SYSTOLIC BLOOD PRESSURE: 138 MMHG

## 2021-07-08 DIAGNOSIS — Z90.721 S/P LEFT OOPHORECTOMY: ICD-10-CM

## 2021-07-08 DIAGNOSIS — D27.1 MUCINOUS CYSTADENOMA OF OVARY, LEFT: Primary | ICD-10-CM

## 2021-07-08 PROCEDURE — 99024 POSTOP FOLLOW-UP VISIT: CPT | Performed by: PHYSICIAN ASSISTANT

## 2021-07-08 NOTE — PROGRESS NOTES
[address] [place] [address], [facility], [address] [address]    Jeniffer Thrasher is a 30 y.o. female who presents for a Post Operative visit today     CC/HPI:  She is a  female who initially presented to her nurse midwife TODD Love in  with concerns of left pelvic discomfort. She had a pelvic ultrasound which revealed a pelvic cyst measuring 5.5 cm. Patient was advised to repeat ultrasound in 1 year. Repeat pelvic ultrasound on 2020 revealed the left ovary measuring 6.5 x 6.4 x 6.2 cm with a 5.5 cm simple appearing cyst.    She then underwent a CT abdomen pelvis on 2020 because of her left lower quadrant discomfort. The simple appearing cyst was again noted. A follow-up MRI of the pelvis was performed on 2020 which revealed left ovary was almost completely replaced by this enlarging cyst measuring 6.2 x 6.0 x 6.5 cm. Short-term follow-up in 3 months recommended. Repeat MRI pelvis on 3/11/2021 revealed interval enlargement of the complex cyst involving the left ovary now measuring up to 7.9 cm. Tumor markers were then obtained which revealed an elevated C1 25 at 62 units. CEA was also slightly elevated at 4.9 units disease patient does have a history of Crohn's disease) and elevated ova 1 score. Therefore she was referred to [address] [place] [address] gynecologic oncology for further evaluation and treatment. At this time the patient did disclose she had a history of Crohn's disease and reported history of a \"urgent surgery to have part of colon removed\". Was in the process of being treated by her gastroenterologist with immunotherapy infusions. The patient also stated that she did not desire any future fertility. Discussions for management were held and recommendations included surgical removal of left adnexal cyst.  We would also perform a right salpingectomy with possible right oophorectomy.     General surgery was consulted with the patient's history of extensive abdominal surgery and Crohn's disease in the face of likely severe pelvic adhesions. She underwent a robotic assisted lysis of extensive adhesions by Dr. David Story and a left salpingo-oophorectomy, right salpingectomy, and peritoneal washings for cytology on 5/18/2021    Final pathology revealed a benign left ovarian mucinous cystadenoma. Bilateral fallopian tubes were negative for abnormalities. She did well post operatively. She was most recently seen on 6/10/21 for a post operative check and had concerns of RUQ incisional pain. There was no evidence of infectious process and patient was recommended to take alternating Ibuprofen and tylenol. Today she has no concerns. She states her previous abdominal incisional pain has resolved. She has no new pelvic pain. She has not resumed her menses as she was previously on Depo vera. Her appetite is stable. She is ambulating without concerns. ROS:  I have personally reviewed and agree with the review of systems done by my ancillary staff in the [facility] documentation. Objective:  Vitals:    07/08/21 0915   BP: 138/80   Pulse: 84   Temp: 96.8 °F (36 °C)   TempSrc: Temporal   SpO2: 96%   Weight: 215 lb (97.5 kg)   Height: 5' 4\" (1.626 m)       Physical Exam:  General: well-appearing, no acute distress    Abdomen: multiple laparoscopic incision scars present. Incisions are fully closed and without drainage. Non tender to deep palpation. No herniations. Prior midline laparotomy scar present. No rashes or erythematous process throughout abdomen. Assessment:  1. Benign ovarian mucinous cystadenoma, s/p left salpingo-oophorectomy and right salpingectomy    Post Operative Changes:  Stable    Discussed final pathology results with the patient, and all questions were answered.     Plan:  Follow Up Instructions:  Advised to follow up with Gynecology for general well woman's care and cervical cancer screening     Future follow up with your gastroenterologist as necessary    She is cleared to resume normal daily functions without restrictions    We will see her PRN    Electronically signed by Jin Stafford PA-C on 7/8/21 at 9:16 AM EDT

## 2021-07-08 NOTE — PATIENT INSTRUCTIONS
Follow up with your medical care teams as instructed    Advised to follow up with Gynecology for well woman's care    Call or return to clinic in the future if needed

## 2021-07-12 ENCOUNTER — HOSPITAL ENCOUNTER (OUTPATIENT)
Age: 36
Discharge: HOME | End: 2021-07-12
Payer: COMMERCIAL

## 2021-07-12 LAB
ALBUMIN SERPL-MCNC: 4.3 G/DL (ref 3.5–5.2)
ALBUMIN/GLOBULIN RATIO: 1.5 (ref 1–2.5)
ALP BLD-CCNC: 92 U/L (ref 35–104)
ALT SERPL-CCNC: 230 U/L (ref 5–33)
ANION GAP SERPL CALCULATED.3IONS-SCNC: 14 MMOL/L (ref 9–17)
AST SERPL-CCNC: 148 U/L
BILIRUB SERPL-MCNC: 0.67 MG/DL (ref 0.3–1.2)
BUN BLDV-MCNC: 9 MG/DL (ref 6–20)
BUN/CREAT BLD: ABNORMAL (ref 9–20)
CALCIUM SERPL-MCNC: 9.1 MG/DL (ref 8.6–10.4)
CHLORIDE BLD-SCNC: 108 MMOL/L (ref 98–107)
CO2: 21 MMOL/L (ref 20–31)
CREAT SERPL-MCNC: 0.58 MG/DL (ref 0.5–0.9)
GFR AFRICAN AMERICAN: >60 ML/MIN
GFR NON-AFRICAN AMERICAN: >60 ML/MIN
GFR SERPL CREATININE-BSD FRML MDRD: ABNORMAL ML/MIN/{1.73_M2}
GFR SERPL CREATININE-BSD FRML MDRD: ABNORMAL ML/MIN/{1.73_M2}
GLUCOSE BLD-MCNC: 93 MG/DL (ref 70–99)
POTASSIUM SERPL-SCNC: 3.5 MMOL/L (ref 3.7–5.3)
SODIUM BLD-SCNC: 143 MMOL/L (ref 135–144)
TOTAL PROTEIN: 7.2 G/DL (ref 6.4–8.3)

## 2021-07-12 PROCEDURE — 80053 COMPREHEN METABOLIC PANEL: CPT

## 2021-07-27 ENCOUNTER — HOSPITAL ENCOUNTER (OUTPATIENT)
Age: 36
Discharge: HOME | End: 2021-07-27
Payer: COMMERCIAL

## 2021-07-27 LAB
ALBUMIN SERPL-MCNC: 4.3 G/DL (ref 3.5–5.2)
ALBUMIN/GLOBULIN RATIO: 1.4 (ref 1–2.5)
ALP BLD-CCNC: 92 U/L (ref 35–104)
ALT SERPL-CCNC: 290 U/L (ref 5–33)
ANION GAP SERPL CALCULATED.3IONS-SCNC: 13 MMOL/L (ref 9–17)
AST SERPL-CCNC: 219 U/L
BILIRUB SERPL-MCNC: 0.49 MG/DL (ref 0.3–1.2)
BUN BLDV-MCNC: 11 MG/DL (ref 6–20)
BUN/CREAT BLD: ABNORMAL (ref 9–20)
CALCIUM SERPL-MCNC: 9.3 MG/DL (ref 8.6–10.4)
CHLORIDE BLD-SCNC: 109 MMOL/L (ref 98–107)
CO2: 19 MMOL/L (ref 20–31)
CREAT SERPL-MCNC: 0.62 MG/DL (ref 0.5–0.9)
GFR AFRICAN AMERICAN: >60 ML/MIN
GFR NON-AFRICAN AMERICAN: >60 ML/MIN
GFR SERPL CREATININE-BSD FRML MDRD: ABNORMAL ML/MIN/{1.73_M2}
GFR SERPL CREATININE-BSD FRML MDRD: ABNORMAL ML/MIN/{1.73_M2}
GLUCOSE BLD-MCNC: 107 MG/DL (ref 70–99)
POTASSIUM SERPL-SCNC: 3.7 MMOL/L (ref 3.7–5.3)
SODIUM BLD-SCNC: 141 MMOL/L (ref 135–144)
TOTAL PROTEIN: 7.4 G/DL (ref 6.4–8.3)

## 2021-07-27 PROCEDURE — 80053 COMPREHEN METABOLIC PANEL: CPT

## 2021-07-27 PROCEDURE — 36415 COLL VENOUS BLD VENIPUNCTURE: CPT

## 2021-08-12 ENCOUNTER — HOSPITAL ENCOUNTER (OUTPATIENT)
Dept: ULTRASOUND IMAGING | Age: 36
Discharge: HOME | End: 2021-08-14
Payer: COMMERCIAL

## 2021-08-12 DIAGNOSIS — R94.5 ABNORMAL LIVER FUNCTION: ICD-10-CM

## 2021-08-12 PROCEDURE — 76705 ECHO EXAM OF ABDOMEN: CPT

## 2021-08-26 ENCOUNTER — HOSPITAL ENCOUNTER (OUTPATIENT)
Age: 36
Discharge: HOME | End: 2021-08-26
Payer: COMMERCIAL

## 2021-08-26 LAB
ANION GAP SERPL CALCULATED.3IONS-SCNC: 12 MMOL/L (ref 9–17)
BUN BLDV-MCNC: 12 MG/DL (ref 6–20)
BUN/CREAT BLD: NORMAL (ref 9–20)
CALCIUM SERPL-MCNC: 9.2 MG/DL (ref 8.6–10.4)
CHLORIDE BLD-SCNC: 105 MMOL/L (ref 98–107)
CO2: 20 MMOL/L (ref 20–31)
CREAT SERPL-MCNC: 0.59 MG/DL (ref 0.5–0.9)
GFR AFRICAN AMERICAN: >60 ML/MIN
GFR NON-AFRICAN AMERICAN: >60 ML/MIN
GFR SERPL CREATININE-BSD FRML MDRD: NORMAL ML/MIN/{1.73_M2}
GFR SERPL CREATININE-BSD FRML MDRD: NORMAL ML/MIN/{1.73_M2}
GLUCOSE BLD-MCNC: 96 MG/DL (ref 70–99)
POTASSIUM SERPL-SCNC: 3.9 MMOL/L (ref 3.7–5.3)
SODIUM BLD-SCNC: 137 MMOL/L (ref 135–144)

## 2021-08-26 PROCEDURE — 80048 BASIC METABOLIC PNL TOTAL CA: CPT

## 2021-08-26 PROCEDURE — 36415 COLL VENOUS BLD VENIPUNCTURE: CPT

## 2021-09-08 ENCOUNTER — HOSPITAL ENCOUNTER (OUTPATIENT)
Dept: PHYSICAL THERAPY | Age: 36
Setting detail: THERAPIES SERIES
Discharge: HOME | End: 2021-09-08
Payer: COMMERCIAL

## 2021-09-08 PROCEDURE — 97161 PT EVAL LOW COMPLEX 20 MIN: CPT

## 2021-09-08 NOTE — FLOWSHEET NOTE
[facility] Fall Risk Assessment    Patient Name:  Karon Trotter  : 1985        Risk Factor Scale  Score   History of Falls [x] Yes  [] No 25  0 25   Secondary Diagnosis [] Yes  [x] No 15  0 0   Ambulatory Aid [] Furniture  [] Crutches/cane/walker  [x] None/bedrest/wheelchair/nurse 30  15  0 0   IV/Heparin Lock [] Yes  [x] No 20  0 0   Gait/Transferring [] Impaired  [] Weak  [x] Normal/bedrest/immobile 20  10  0 0   Mental Status [] Forgets limitations  [x] Oriented to own ability 15  0 0      Total: 25     Based on the Assessment score: check the appropriate box.     []  No intervention needed   Low =   Score of 0-24    [x]  Use standard prevention interventions Moderate =  Score of 24-44   [x] Give patient handout and discuss fall prevention strategies   [x] Establish goal of education for patient/family RE: fall prevention strategies    []  Use high risk prevention interventions High = Score of 45 and higher   [] Give patient handout and discuss fall prevention strategies   [] Establish goal of education for patient/family Re: fall prevention strategies   [] Discuss lifeline / other resources    Electronically signed by:   Vasyl Aj PT  Date: 2021

## 2021-09-08 NOTE — CONSULTS
[x] [facility]) - [facility] &  Therapy  [address]  [phone]  F: [phone] [] [email or web address]  [facility] [address]   [address]  P: [phone]  F: [phone] [] [address] &  Therapy  [address] [facility] [address]  P: [phone]  F: [phone] [] [address]  P: [phone]  F: [phone] [] [address]  [facility]   [address]   [place]: [phone]  F: [phone]      Physical Therapy Spine Evaluation    Date:  2021  Patient: Guru Redding  : 1985  MRN: [id number]  Physician: Nicole Melissa PA-C     Insurance: "[employer] [place] after eval  Medical Diagnosis: low back pain    Rehab Codes: M 54.5, M 62.830, M 62.81, Z 91.81  Onset Date: 21    Next 's appt.: 21    Subjective:   CC:   Low back pain that shoots to her hips. If she does too much walking, the pain will switch directions. It will come and go. The pain will stay 2-3 weeks and then will improve for a few months. Hot baths, uses estim from the store. Has tried creams without success and tried patches but they are unable to stick on her. Hot bath soaks will allow her to get some work done in the house. Leg will go numb at times - but it rotates right/left sides. Has home TENS unit but cannot find replacement patches. HPI: (21)  States she woke out of bed one morning and was unable to walk. Mom and grandma had to help her walk and she kept falling down. Went to ED, and received a shot in her back. Last time she had x-rays, it showed she gets a lot of muscle spasms in her back.     PMHx: [] Unremarkable [] Diabetes [x] HTN  [] Pacemaker   [] MI/Heart Problems [] Cancer [] Arthritis [] Other:              [x] Refer to full medical chart  In EPIC Assist    Grocery shop/meal prep [x] Independent  [] Assist [x] Independent  [] Assist      Gait Prior level of function Current level of function    [x] Independent  [] Assist [x] Independent  [] Assist   Device: [x] Independent [x] Independent    [] Straight Cane [] Quad cane [] Straight Cane [] Quad cane    [] Standard walker [] Rolling walker   [] 4 wheeled walker [] Standard walker [] Rolling walker   [] 4 wheeled walker    [] Wheelchair [] Wheelchair     Pain:  [x] Yes  [] No Location: Low back, bilateral hips/legs   Pain Rating: (0-10 scale) 5+/10  Pain altered Tx:  [] Yes  [x] No  Action:    Symptoms:  [] Improving [x] Worsening [x] Same  Better:  [] AM    [] PM    [] Sit    [] Rise/Sit    []Stand    [] Walk    [] Lying    [x] Other: soak in hot baths  Worse: [] AM    [] PM    [] Sit    [] Rise/Sit    []Stand    [x] Walk    [] Lying    [] Bend                      [] Valsalva    [x] Other: lifting  Sleep: [] OK    [x] Disturbed - sleeping in bed. Sleeping 1 hour without waking when back hurts; sleeps 6 hours without waking. Objective:   STRENGTH  ROM    Left Right Cervical    L1-2 Hip Flex 4 4 Flexion    Hip Abd 4 4 Extension    L3-4 Knee Ext 4 4 Rotation L R   L4 Ankle DF 4 4 Sidebend L R   L5 EHL 4 4 Retraction    S1 Plant. Flex 4 4 Lumbar    Abdominals   Flexion 95   Erector Spinae   Extension 10      Rotation L WFL R WFL      Sidebend L 25 R 12      UE/LE        Hamstring flexibility L lack 42 R lack 35     TESTS (+/-) LEFT RIGHT Not Tested   SLR [] sit [x] supine + + []   LTR - - []   SKTC Pain back Pain hip []   DKTC - - []   Piriformis stretch pn R side pn R side []   Berna Tests ? Pain ?  Pain No Change Not Tested   RFIS [x] [] [] []   SUKHJINDER [x] [] [] []   RFIL [x] [] [] []   REIL [x] [] [] []     OBSERVATION No Deficit Deficit Not Tested Comments   Posture       Forward Head [x] [] []    Rounded Shoulders [] [x] []    Kyphosis [x] [] []    Lordosis [x] [] []    Lateral Shift [] [] [x] Scoliosis [] [] [x]    Iliac Crest [x] [] []    PSIS [x] [] []    ASIS [x] [] []    Genu Valgus [x] [] []    Genu Varus [x] [] []    Genu Recurvatum [x] [] []    Pronation [x] [] []    Supination [x] [] []    Leg Length Discrp [x] [] []    Slumped Sitting [] [x] []    Palpation [] [x] [] Spasms throughout back   Sensation [] [x] [] Shooting pain to legs   Edema [x] [] []    Neurological [x] [] []      Functional Test: Oswestry Score: 62% functionally impaired     Comments: SLS 3 seconds right leg, 2 seconds left leg. Assessment:  Patient would benefit from skilled physical therapy services in order to: improve lumbar strength, decrease risk of falls, improve core strength, improve function. Problems:    [x] ? Pain:  [x] ? ROM:  [x] ? Strength:  [x] ? Function:  [] Other:      STG: (to be met in 9 treatments)  1. ? Pain: Lumbar pain improve to 4/10 at max with standing  2. ? ROM: Bilateral hamstring flexibility improve to lacking 25 degrees or less  3. ? Strength: Patient able to SLS each leg x 6 seconds without LOB or hip drop  4. ? Function: Patient to report improved sleep to 3 hours without waking due to back pain  5. Patient to be independent with home exercise program as demonstrated by performance with correct form without cues. 6. Demonstrate Knowledge of fall prevention  LTG: (to be met in 18 treatments)  1. Lumbar pain improve to 2/10 at max after doing a day full of household chores  2. Bilateral hamstring flexibility improve to lacking 20 degrees or less  3. Patient to report resolution of leg numbness  4. Oswestry score improve to lacking 30% functionally impaired or less  5. Patient able to sleep 5 hours without waking due to back pain. Patient goals:   \"To figure out what the pain ins\"    Rehab Potential:  [x] Good  [] Fair  [] Poor   Suggested Professional Referral:  [x] No  [] Yes:  Barriers to Goal Achievement:  [] No  [x] Yes: Multiple abdominal surgeries   Domestic Concerns:  [x] No  [] Yes: Pt. Education:  [x] Plans/Goals, Risks/Benefits discussed  [] Home exercise program  Method of Education: [x] Verbal  [] Demo  [] Written - re: POC  Comprehension of Education:  [] Verbalizes understanding. [] Demonstrates understanding. [x] Needs Review. [] Demonstrates/verbalizes understanding of HEP/Ed previously given. Treatment Plan:  [x] Therapeutic Exercise   54818  [] Iontophoresis: 4 mg/mL Dexamethasone Sodium Phosphate  mAmin  04252   [x] Therapeutic Activity  92818 [] Vasopneumatic cold with compression  93249    [] Gait Training   50637 [] Ultrasound   49057   [] Neuromuscular Re-education  41026 [x] Electrical Stimulation Unattended  69987   [x] Manual Therapy  88769 [] Electrical Stimulation Attended  29049   [x] Instruction in HEP  [] Lumbar/Cervical Traction  26682   [] Aquatic Therapy   62160 [x] Cold/hotpack    [] Massage   94732      [] Dry Needling, 1 or 2 muscles  73893   [] Biofeedback, first 15 minutes   00197  [] Biofeedback, additional 15 minutes   01079 [] Dry Needling, 3 or more muscles  88926     []  Medication allergies reviewed for use of    Dexamethasone Sodium Phosphate 4mg/ml     with iontophoresis treatments. Pt is not allergic. Frequency:  2 x/week for 18 visits    Todays Treatment:  Modalities:   Precautions:  Exercises:  Exercise Reps/ Time Weight/ Level Comments   Quadruped stretch 2 x 10 sec Felt good   Prone hip ext 5 x ea  Slight back pain   Prone lying  2 min  Slight back pain               Other:  Some activities increased pain, some decreased. Need to work on core stabilization due to multiple abdominal surgeries. Also stretching to lumbar musculature. Specific Instructions for next treatment:  Stretches to low back. DLS for core strength. SLR, hip abd, hip ext, bridge with butterfly, bridge with march, lumbar extension machine, leg press.       Evaluation Complexity:  History (Personal factors, comorbidities) [] 0 [] 1-2 [x] 3+   Exam (limitations, restrictions) [] 1-2 [x] 3 [] 4+   Clinical presentation (progression) [x] Stable [] Evolving  [] Unstable   Decision Making [x] Low [] Moderate [] High    [x] Low Complexity [] Moderate Complexity [] High Complexity       Treatment Charges: Mins Units   [x] Evaluation       [x]  Low       []  Moderate       []  High 25 1   []  Modalities     []  Ther Exercise     []  Manual Therapy     []  Ther Activities     []  Aquatics     []  Vasocompression     []  Other       TOTAL TREATMENT TIME: 25    Time in: 1111a      Time out: 1139    Electronically signed by: Gem Euceda PT        Physician Signature:________________________________Date:__________________  By signing above or cosigning this note, I have reviewed this plan of care and certify a need for medically necessary rehabilitation services.      *PLEASE SIGN ABOVE AND FAX BACK ALL PAGES*

## 2021-09-13 NOTE — PRE-CERTIFICATION NOTE
[x] [facility]) [facility] &  Therapy  [address]  [phone]  F: [phone] [] [address] [facility] [address]   [address]  P: [phone]  F: [phone] [] [place]  [address] [facility]  P: [phone]  F: [phone] [] [address]  [facility]   [address]   [place]: [phone]  F: [phone]  [x] [address]   Outpatient Occupational Therapy  [address]: [phone]  F: [phone]          Therapy Pre-certification Note      9/13/2021    Derek Roman  1985   [id number]      Insurance approval was received for Physical Therapy from [facility] on 9/13/21. Approval was received for 12 visits, from 9/13/21 to 10/13/21. Authorization number [id number]. Patient was contacted to be scheduled and message was left. .      Electronically signed by Chandler Culp PT on 9/13/2021 at 2:01 PM

## 2021-09-22 ENCOUNTER — HOSPITAL ENCOUNTER (OUTPATIENT)
Dept: PHYSICAL THERAPY | Age: 36
Setting detail: THERAPIES SERIES
Discharge: HOME | End: 2021-09-22
Payer: COMMERCIAL

## 2021-09-22 PROCEDURE — 97110 THERAPEUTIC EXERCISES: CPT

## 2021-09-24 ENCOUNTER — HOSPITAL ENCOUNTER (OUTPATIENT)
Age: 36
Discharge: HOME | End: 2021-09-24
Payer: COMMERCIAL

## 2021-09-24 LAB
ALBUMIN SERPL-MCNC: 4.5 G/DL (ref 3.5–5.2)
ALBUMIN/GLOBULIN RATIO: 1.4 (ref 1–2.5)
ALP BLD-CCNC: 97 U/L (ref 35–104)
ALT SERPL-CCNC: 284 U/L (ref 5–33)
ANION GAP SERPL CALCULATED.3IONS-SCNC: 16 MMOL/L (ref 9–17)
AST SERPL-CCNC: 258 U/L
BILIRUB SERPL-MCNC: 0.71 MG/DL (ref 0.3–1.2)
BUN BLDV-MCNC: 9 MG/DL (ref 6–20)
BUN/CREAT BLD: ABNORMAL (ref 9–20)
CALCIUM SERPL-MCNC: 9.6 MG/DL (ref 8.6–10.4)
CHLORIDE BLD-SCNC: 107 MMOL/L (ref 98–107)
CHOLESTEROL, FASTING: 252 MG/DL
CHOLESTEROL/HDL RATIO: 4.7
CO2: 18 MMOL/L (ref 20–31)
CREAT SERPL-MCNC: 0.62 MG/DL (ref 0.5–0.9)
ESTIMATED AVERAGE GLUCOSE: 108 MG/DL
GFR AFRICAN AMERICAN: >60 ML/MIN
GFR NON-AFRICAN AMERICAN: >60 ML/MIN
GFR SERPL CREATININE-BSD FRML MDRD: ABNORMAL ML/MIN/{1.73_M2}
GFR SERPL CREATININE-BSD FRML MDRD: ABNORMAL ML/MIN/{1.73_M2}
GLUCOSE BLD-MCNC: 95 MG/DL (ref 70–99)
HBA1C MFR BLD: 5.4 % (ref 4–6)
HCT VFR BLD CALC: 45.9 % (ref 36.3–47.1)
HDLC SERPL-MCNC: 54 MG/DL
HEMOGLOBIN: 14.9 G/DL (ref 11.9–15.1)
IRON: 169 UG/DL (ref 37–145)
LDL CHOLESTEROL: 146 MG/DL (ref 0–130)
MCH RBC QN AUTO: 32.2 PG (ref 25.2–33.5)
MCHC RBC AUTO-ENTMCNC: 32.5 G/DL (ref 28.4–34.8)
MCV RBC AUTO: 99.1 FL (ref 82.6–102.9)
NRBC AUTOMATED: 0 PER 100 WBC
PDW BLD-RTO: 13.2 % (ref 11.8–14.4)
PLATELET # BLD: 276 K/UL (ref 138–453)
PMV BLD AUTO: 10.9 FL (ref 8.1–13.5)
POTASSIUM SERPL-SCNC: 3.4 MMOL/L (ref 3.7–5.3)
RBC # BLD: 4.63 M/UL (ref 3.95–5.11)
SODIUM BLD-SCNC: 141 MMOL/L (ref 135–144)
TOTAL PROTEIN: 7.7 G/DL (ref 6.4–8.3)
TRIGLYCERIDE, FASTING: 258 MG/DL
VITAMIN B-12: 382 PG/ML (ref 232–1245)
VITAMIN D 25-HYDROXY: 34.8 NG/ML (ref 30–100)
VLDLC SERPL CALC-MCNC: ABNORMAL MG/DL (ref 1–30)
WBC # BLD: 9.1 K/UL (ref 3.5–11.3)

## 2021-09-24 PROCEDURE — 85027 COMPLETE CBC AUTOMATED: CPT

## 2021-09-24 PROCEDURE — 82306 VITAMIN D 25 HYDROXY: CPT

## 2021-09-24 PROCEDURE — 36415 COLL VENOUS BLD VENIPUNCTURE: CPT

## 2021-09-24 PROCEDURE — 80061 LIPID PANEL: CPT

## 2021-09-24 PROCEDURE — 83036 HEMOGLOBIN GLYCOSYLATED A1C: CPT

## 2021-09-24 PROCEDURE — 83540 ASSAY OF IRON: CPT

## 2021-09-24 PROCEDURE — 82607 VITAMIN B-12: CPT

## 2021-09-24 PROCEDURE — 80053 COMPREHEN METABOLIC PANEL: CPT

## 2021-09-29 ENCOUNTER — HOSPITAL ENCOUNTER (OUTPATIENT)
Dept: PHYSICAL THERAPY | Age: 36
Setting detail: THERAPIES SERIES
Discharge: HOME | End: 2021-09-29
Payer: COMMERCIAL

## 2021-09-29 PROCEDURE — 97110 THERAPEUTIC EXERCISES: CPT

## 2021-09-29 NOTE — FLOWSHEET NOTE
to stretch R side               Supine       Hs stretch  2x20\" Strap     Piriformis stretch  2x30\"  Pull across to opp shoulder    TrA in hooklying  10x3\"     SLR with TrA 10x     Bridge       Bridge with alt march  10x     Bridge with butterfly  10x     Heel walk outs 10x  PPT   Reverse curls  10x  Added 9/29                     Side lying       Hip abd  10x2  Increased reps 9/29             Standing       Pushing ball into table for TrA 10x3\" M yellow ball                 Gym    Added 9/29   Leg press 10x2 20#    lumbar ext machine  10x2 1pl + 5 lb  Total=25#     Other:     Specific Instructions for subsequent treatments: Stretches to low back. DLS for core strength. SLR, hip abd, hip ext, bridge with butterfly, bridge with march, lumbar extension machine, leg press. Treatment Charges: Mins Units   []  Modalities     [x]  Ther Exercise 44 3   []  Manual Therapy     []  Ther Activities     []  Aquatics     []  Vasocompression     []  Other     Total Treatment time 44 3       Assessment: [x] Progressing toward goals. Initiated session with increased resistance on sci fit followed by mat stretches to improve flexibility. Addition of bridges with clamshell and reverse curls to progress core stabilization. Completed physio ball roll out to stretch R side of LB following mat exercises to reduce irritation. Implemented leg press and lumbar extension machine this date with demo's provided. Notes 'good stretch' with lumbar extension. Pt with good tolerance to all progressions and denies presence of pain post treatment. [] No change. [] Other:  [x] Patient would continue to benefit from skilled physical therapy services in order to: improve lumbar strength, decrease risk of falls, improve core strength, improve function. STG/LTG  Problems:    [x] ? Pain:  [x] ? ROM:  [x] ? Strength:  [x] ?  Function:  [] Other:       STG: (to be met in 9 treatments)  1. ? Pain: Lumbar pain improve to 4/10 at max with standing  2. ? ROM: Bilateral hamstring flexibility improve to lacking 25 degrees or less  3. ? Strength: Patient able to SLS each leg x 6 seconds without LOB or hip drop  4. ? Function: Patient to report improved sleep to 3 hours without waking due to back pain  5. Patient to be independent with home exercise program as demonstrated by performance with correct form without cues. 6. Demonstrate Knowledge of fall prevention  LTG: (to be met in 18 treatments)  1. Lumbar pain improve to 2/10 at max after doing a day full of household chores  2. Bilateral hamstring flexibility improve to lacking 20 degrees or less  3. Patient to report resolution of leg numbness  4. Oswestry score improve to lacking 30% functionally impaired or less  5. Patient able to sleep 5 hours without waking due to back pain. Patient goals: \"To figure out what the pain is\"    Pt. Education:  [x] Yes  [] No  [x] Reviewed Prior HEP/Ed  Method of Education: [x] Verbal  [x] Demo  [] Written  Comprehension of Education:  [x] Verbalizes understanding. [x] Demonstrates understanding. [] Needs review. [] Demonstrates/verbalizes HEP/Ed previously given. Plan: [x] Continue current frequency toward long and short term goals. [x] Specific Instructions for subsequent treatments: Stretches to low back. DLS for core strength. SLR, hip abd, hip ext, bridge with butterfly, bridge with march, lumbar extension machine, leg press.       Time In: 11:02 am    Time Out: 11:51 am    Electronically signed by:  Michelle Mitchell PTA

## 2021-10-01 ENCOUNTER — HOSPITAL ENCOUNTER (OUTPATIENT)
Dept: PHYSICAL THERAPY | Age: 36
Setting detail: THERAPIES SERIES
Discharge: HOME | End: 2021-10-01
Payer: COMMERCIAL

## 2021-10-01 PROCEDURE — 97110 THERAPEUTIC EXERCISES: CPT

## 2021-10-01 NOTE — FLOWSHEET NOTE
[x] [facility] &  Therapy  [address]  [phone]  F: [phone] [] [address]  [facility]   [address]  P: [phone]  F: [phone] [] [address] &  Therapy  [address] [facility]  P: [phone]  F: [phone] [] [address]  P: [phone]  F: [phone] [] [address]  [facility]   [address]   [place]: [phone]  F: [phone]      Physical Therapy Daily Treatment Note    Date:  10/1/2021  Patient Name:  Juana Smiley    :  1985  MRN: [id number]  Physician: Warnell Eisenmenger, PA-C                               Insurance: Milbert Numbers after eval  Medical Diagnosis: low back pain                  Rehab Codes: M 54.5, M 62.830, M 62.81, Z 91.81  Onset Date: 21                 Next 's appt.: 21  Approval was received for 12 visits, from 21 to 10/13/21. Authorization number [id number]. Visit# / total visits:      Cancels/No Shows: 0    Subjective:    Pain:  [x] Yes  [] No Location: LBP  Pain Rating: (0-10 scale) 1/10  Pain altered Tx:  [x] No  [] Yes  Action:  Comments: The patient states that her back pain is feeling better, states she has been taking it easy the past few days.      Objective:  Modalities:   Precautions:   Exercises:  Exercise Reps/ Time Weight/ Level Comments Completed    Nu step/sci fit  5' L 2.5  x                 Prone        Quadruped stretch 3 x 10 sec Felt good    Prone hip ext 10 x 2       Prone on elbows  2 min       Alt UE's  10x      Hip flexor + quad stretch 2 x 30 sec    x   Quadruped   Add as able                          Seated       Hs stretch  2x30\" Stool     lumbar flexion  10x  reaching towards floor     Lumbar rotation  10x  Arms crossed on chest    TrA 10x3\"      Marches with TrA 10x   x   SB roll outs   lateral flex w/rot 5x5-10\"  Roll to L to stretch R side x                 Supine        Hs stretch  2x20\" Strap  Paired with contract relax MET 3 x 5 sec x   Piriformis stretch  2x30\"  Pull across to opp shoulder  x   TrA in hooklying  10x3\"   Rev   SLR with TrA 10x   x   Bridge     x   Bridge with alt march  10x      Bridge with butterfly  10x  With abduction isometric  x   Heel walk outs 10x  PPT    Reverse curls  10x  Added 9/29                         Side lying        Hip abd  10x2  Increased reps 9/29 x   Clamshell 10x 2   lime   x   Standing        Pushing ball into table for TrA 10x3\" M yellow ball                    Gym    Added 9/29    Leg press 10x2 20#     lumbar ext machine  10x2 1pl + 5 lb  Total=25#      Other:      HEP:   Supine Bridge   Hooklying Hamstring Stretch with Strap   Seated March   Clamshell with Resistance      Specific Instructions for subsequent treatments: Stretches to low back. DLS for core strength. SLR, hip abd, hip ext, bridge with butterfly, bridge with march, lumbar extension machine, leg press. Treatment Charges: Mins Units   []  Modalities     []  Ther Exercise 40 3   []  Manual Therapy     []  Ther Activities     []  Aquatics     []  Vasocompression     []  Other     Total Treatment time 40        Assessment: [x] Progressing toward goals. Continued stretches as listed above with improvement of symptoms. The patient demonstrates muscular restrictions of the hamstrings therefore provided PNF contract relax technique paired with hamstring stretch with observed improvement in tissue extensibility. Completed therapeutic exercises as listed above with good tolerance. Exercises this date focused of TA contraction with LE movement and hip strengthening. Due to patient's report of not having prescribed exercises at home PT provided HEP as listed above. [] No change.       [] Other:  [x] Patient would

## 2021-10-06 ENCOUNTER — HOSPITAL ENCOUNTER (OUTPATIENT)
Dept: PHYSICAL THERAPY | Age: 36
Setting detail: THERAPIES SERIES
Discharge: HOME | End: 2021-10-06
Payer: COMMERCIAL

## 2021-10-06 PROCEDURE — 97110 THERAPEUTIC EXERCISES: CPT

## 2021-10-06 NOTE — FLOWSHEET NOTE
[x] [facility]) [facility] TWEL[facility]STEP [facility] &  Therapy  [address]  [phone]  F: [phone] [] [address]  [facility]   [address]  P: [phone]  F: [phone] [] [address] &  Therapy  [address] [facility]  P: [phone]  F: [phone] [] [address]  P: [phone]  F: [phone] [] [address]  [facility]   [address]   [place]: [phone]  F: [phone]      Physical Therapy Daily Treatment Note    Date:  10/6/2021  Patient Name:  Girish Batista    :  1985  MRN: [id number]  Physician: Pvaan Szymanski PA-C                               Insurance: Gaudencio Showers after eval  Medical Diagnosis: low back pain                  Rehab Codes: M 54.5, M 62.830, M 62.81, Z 91.81  Onset Date: 21                 Next 's appt.: 21  Approval was received for 12 visits, from 21 to 10/13/21. Authorization number [id number]. Visit# / total visits:      Cancels/No Shows: 0    Subjective:    Pain:  [x] Yes  [] No Location: LBP  Pain Rating: (0-10 scale) 1/10  Pain altered Tx:  [x] No  [] Yes  Action:  Comments: The patient states that her back pain is feeling better, states she has been taking it easy the past few days.      Objective:  Modalities:   Precautions:   Exercises:  Exercise Reps/ Time Weight/ Level Comments Completed    Nu step/sci fit  5' L 2.5  x                 Prone        Quadruped stretch 3 x 10 sec Felt good    Prone hip ext 10 x 2       Prone on elbows  2 min       Alt UE's  10x      Hip flexor + quad stretch 2 x 30 sec    x   Quadruped   Add as able                          Seated       Hs stretch  2x30\" Stool     lumbar flexion  10x  reaching towards floor     Lumbar rotation  10x  Arms crossed on chest    TrA 10x3\"      Marches with TrA 10x   x   SB roll outs   lateral flex w/rot 5x5-10\"  Roll to L to stretch R side x                 Supine        Hs stretch  2x20\" Strap  Paired with contract relax MET 3 x 5 sec x   Piriformis stretch  2x30\"  Pull across to opp shoulder  x   TrA in hooklying  10x3\"   Rev   SLR with TrA 10x   x   Bridge     x   Bridge with alt march  10x      Bridge with butterfly  10x  With abduction isometric  x   Heel walk outs 10x  PPT    Reverse curls  10x  Added 9/29                         Side lying        Hip abd  10x2  Increased reps 9/29 x   Clamshell 10x 2   lime   x   Standing        Pushing ball into table for TrA 10x3\" M yellow ball                    Gym    Added 9/29    Leg press 10x2 20#     lumbar ext machine  10x2 1pl + 5 lb  Total=25#      Other:      HEP:   Supine Bridge   Hooklying Hamstring Stretch with Strap   Seated March   Clamshell with Resistance      Specific Instructions for subsequent treatments: Stretches to low back. DLS for core strength. SLR, hip abd, hip ext, bridge with butterfly, bridge with march, lumbar extension machine, leg press. Treatment Charges: Mins Units   []  Modalities     []  Ther Exercise 40 3   []  Manual Therapy     []  Ther Activities     []  Aquatics     []  Vasocompression     []  Other     Total Treatment time 40        Assessment: [x] Progressing toward goals. Continued stretches as listed above with improvement of symptoms. The patient demonstrates muscular restrictions of the hamstrings therefore provided PNF contract relax technique paired with hamstring stretch with observed improvement in tissue extensibility. Completed therapeutic exercises as listed above with good tolerance. Exercises this date focused of TA contraction with LE movement and hip strengthening. Due to patient's report of not having prescribed exercises at home PT provided HEP as listed above. [] No change.       [] Other:  [x] Patient would continue to benefit from skilled physical therapy services in order to: improve lumbar strength, decrease risk of falls, improve core strength, improve function. STG/LTG  Problems:    [x] ? Pain:  [x] ? ROM:  [x] ? Strength:  [x] ? Function:  [] Other:       STG: (to be met in 9 treatments)  1. ? Pain: Lumbar pain improve to 4/10 at max with standing  2. ? ROM: Bilateral hamstring flexibility improve to lacking 25 degrees or less  3. ? Strength: Patient able to SLS each leg x 6 seconds without LOB or hip drop  4. ? Function: Patient to report improved sleep to 3 hours without waking due to back pain  5. Patient to be independent with home exercise program as demonstrated by performance with correct form without cues. 6. Demonstrate Knowledge of fall prevention  LTG: (to be met in 18 treatments)  1. Lumbar pain improve to 2/10 at max after doing a day full of household chores  2. Bilateral hamstring flexibility improve to lacking 20 degrees or less  3. Patient to report resolution of leg numbness  4. Oswestry score improve to lacking 30% functionally impaired or less  5. Patient able to sleep 5 hours without waking due to back pain. Patient goals: \"To figure out what the pain is\"    Pt. Education:  [x] Yes  [] No  [x] Reviewed Prior HEP/Ed  Method of Education: [x] Verbal  [x] Demo  [x] Written    Access Code: [email or web address]  URL: [email or web address]  Date: 10/01/2021  Prepared by: Nevaeh Heredia    Exercises  Supine Bridge - 2 x daily - 7 x weekly - 3 sets - 10 reps  Hooklying Hamstring Stretch with Strap - 2 x daily - 7 x weekly - 1 sets - 3 reps - 30 sec hold  Seated March - 2 x daily - 7 x weekly - 3 sets - 10 reps  Clamshell with Resistance - 2 x daily - 7 x weekly - 3 sets - 10 reps      Comprehension of Education:  [x] Verbalizes understanding. [x] Demonstrates understanding. [] Needs review. [x] Demonstrates/verbalizes HEP/Ed previously given.      Plan: [x] Continue current frequency toward long and short term goals. [x] Specific Instructions for subsequent treatments: Stretches to low back. DLS for core strength. SLR, hip abd, hip ext, bridge with butterfly, bridge with march, lumbar extension machine, leg press.       Time In: 11:020am    Time Out: 11:50 am    Electronically signed by:  Yani ePng PTA

## 2021-10-06 NOTE — FLOWSHEET NOTE
[x] [facility]) [facility]  [address]  [phone]  F: [phone] [] [address]  [employer] [address]   [address]  P: [phone]  F: [phone] [] [address] &  Therapy  [address]  P: [phone]  F: [phone] [] [address]  P: [phone]  F: [phone] [] [address]  [facility]   [address]   [place]: [phone]  F: [phone]      Physical Therapy Daily Treatment Note    Date:  10/6/2021  Patient Name:  Komal Vazquez    :  1985  MRN: [id number]  Physician: Barbara Bernal PA-C                               Insurance: Diana Hatter after eval  Medical Diagnosis: low back pain                  Rehab Codes: M 54.5, M 62.830, M 62.81, Z 91.81  Onset Date: 21                 Next 's appt.: 21  Approval was received for 12 visits, from 21 to 10/13/21. Authorization number [id number]. Visit# / total visits:      Cancels/No Shows: 0    Subjective:    Pain:  [x] Yes  [] No Location: LBP  Pain Rating: (0-10 scale) 2/10  Pain altered Tx:  [x] No  [] Yes  Action:  Comments: Patient arrives stating she is not having as much \"shooting into the leg\" but just constant pain \"in the one spot\". Reports she got an exercise bike (upright bike) for home for cardio and has been working out on that with no issues.       Objective:  Modalities:   Precautions:   Exercises:  Exercise Reps/ Time Weight/ Level Comments Completed    Nu step/sci fit  5' L 2.5  x                 Prone        Quadruped stretch 3 x 10 sec Felt good    Prone hip ext 10 x 2       Prone on elbows  2 min       Alt UE's  10x      Hip flexor + quad stretch 2 x 30 sec    x   Quadruped   Add as able Seated       Hs stretch  2x30\" Stool  x   lumbar flexion  10x  reaching towards floor     Lumbar rotation  10x  Arms crossed on chest x   TrA 10x3\"      Marches with TrA 10x      SB roll outs   lateral flex w/rot 5x5-10\"  Roll to L to stretch R side x                 Supine        Hs stretch  2x20\" Strap  Paired with contract relax MET 3 x 5 sec    Piriformis stretch  3x30\"  Pull across to opp shoulder, push down x   TrA in hooklying  10x3\"      SLR with TrA 10x   x   Bridge  2x10   x   Bridge with alt march  10x   x   Bridge with butterfly  2x10  With abduction isometric  x   Heel walk outs 10x  PPT    Reverse curls  10x  Added 9/29                         Side lying        Hip abd  10x2   x   Clamshell 10x 2   lime   x                 Gym        Gastroc stretch 3x20\"  Added 10/6 x   Palloff press  10x ea dir Blue TB Added 10/6 x   Leg press 10x2 20#  x   lumbar ext machine  10x2 1pl + 5 lb  Total=25#   x   Other:      HEP:   Supine Bridge   Hooklying Hamstring Stretch with Strap   Seated March   Clamshell with Resistance         Treatment Charges: Mins Units   []  Modalities     []  Ther Exercise 40 3   []  Manual Therapy     []  Ther Activities     []  Aquatics     []  Vasocompression     []  Other     Total Treatment time 40 3       Assessment: [x] Progressing toward goals. Continued with program with progressions as noted above with focus on increased TrA activation with good tolerance. Patient stated minimal increased discomfort with bridging, stating low back tightened up, but did not state increased pain. Notes lumbar extension and stretches feel most relieving. [] No change. [] Other:  [x] Patient would continue to benefit from skilled physical therapy services in order to: improve lumbar strength, decrease risk of falls, improve core strength, improve function. STG/LTG  Problems:    [x] ? Pain:  [x] ? ROM:  [x] ? Strength:  [x] ?  Function:  [] Other:       STG: (to be met in 9 treatments)  1. ? Pain: Lumbar pain improve to 4/10 at max with standing  2. ? ROM: Bilateral hamstring flexibility improve to lacking 25 degrees or less  3. ? Strength: Patient able to SLS each leg x 6 seconds without LOB or hip drop  4. ? Function: Patient to report improved sleep to 3 hours without waking due to back pain  5. Patient to be independent with home exercise program as demonstrated by performance with correct form without cues. 6. Demonstrate Knowledge of fall prevention  LTG: (to be met in 18 treatments)  1. Lumbar pain improve to 2/10 at max after doing a day full of household chores  2. Bilateral hamstring flexibility improve to lacking 20 degrees or less  3. Patient to report resolution of leg numbness  4. Oswestry score improve to lacking 30% functionally impaired or less  5. Patient able to sleep 5 hours without waking due to back pain. Patient goals: \"To figure out what the pain is\"    Pt. Education:  [x] Yes  [] No  [x] Reviewed Prior HEP/Ed  Method of Education: [x] Verbal  [x] Demo  [x] Written    Access Code: [email or web address]  URL: [email or web address]  Date: 10/01/2021  Prepared by: Yuri Los    Exercises  Supine Bridge - 2 x daily - 7 x weekly - 3 sets - 10 reps  Hooklying Hamstring Stretch with Strap - 2 x daily - 7 x weekly - 1 sets - 3 reps - 30 sec hold  Seated March - 2 x daily - 7 x weekly - 3 sets - 10 reps  Clamshell with Resistance - 2 x daily - 7 x weekly - 3 sets - 10 reps      Comprehension of Education:  [x] Verbalizes understanding. [x] Demonstrates understanding. [] Needs review. [x] Demonstrates/verbalizes HEP/Ed previously given. Plan: [x] Continue current frequency toward long and short term goals. [x] Specific Instructions for subsequent treatments: Stretches to low back. DLS for core strength.    3 way hip with band      Time In: 11:00 am    Time Out: 1145 am    Electronically signed by:  Claudette Finch, PTA

## 2021-10-08 ENCOUNTER — HOSPITAL ENCOUNTER (OUTPATIENT)
Dept: PHYSICAL THERAPY | Age: 36
Setting detail: THERAPIES SERIES
Discharge: HOME | End: 2021-10-08
Payer: COMMERCIAL

## 2021-10-08 NOTE — FLOWSHEET NOTE
[x] [facility]) - [facility] &  Therapy  [address]    [phone]  F: [phone]   [] [address]  [facility] 36   [address]  P: [phone]  F: [phone]  [] [address] &  Therapy  [address] [facility]  P: [phone]  F: [phone] [] [address]  P: [phone]  F: [phone]  [] [address]  [facility]   [address]   [place]: [phone]  F: [phone]   [] [address]  [place]: [phone]   F: [phone]     Physical Therapy Cancel/No Show note    Date: 10/8/2021  Patient: Mechelle Bird  : 1985  MRN: [id number]    Cancels/No Shows to date:      For today's appointment patient:    [x]  Cancelled    [] Rescheduled appointment    [] No-show     Reason given by patient:    [x]  Patient ill    []  Conflicting appointment    [] No transportation      [] Conflict with work    [] No reason given    [] Weather related    [] COVID-19    [] Other:      Comments:        [x] Next appointment was confirmed    Electronically signed by: Cullen Mahoney PTA

## 2021-10-12 ENCOUNTER — HOSPITAL ENCOUNTER (OUTPATIENT)
Dept: PHYSICAL THERAPY | Age: 36
Setting detail: THERAPIES SERIES
Discharge: HOME | End: 2021-10-12
Payer: COMMERCIAL

## 2021-10-12 PROCEDURE — 97110 THERAPEUTIC EXERCISES: CPT

## 2021-10-12 NOTE — FLOWSHEET NOTE
[x] [facility]) [facility]  [address]  [phone]  F: [phone] [] [address]  [facility] 36   [address]  P: [phone]  F: [phone] [] [address] &  Therapy  [address] [facility] [address]  P: [phone]  F: [phone] [] [address]  P: [phone]  F: [phone] [] [address]  [facility]   [address]   [place]: [phone]  F: [phone]      Physical Therapy Daily Treatment Note    Date:  10/12/2021  Patient Name:  Renetta Figueroa    :  1985  MRN: [id number]  Physician: Filiberto Lassiter PA-C                               Insurance: [facility]  Medical Diagnosis: low back pain                  Rehab Codes: M 54.5, M 62.830, M 62.81, Z 91.81  Onset Date: 21                 Next 's appt.: 21  Approval was received for 12 visits, from 21 to 10/13/21. Authorization number [id number]. Visit# / total visits:      Cancels/No Shows: 0    Subjective:    Pain:  [x] Yes  [] No Location: LBP  Pain Rating: (0-10 scale) 4/10  Pain altered Tx:  [x] No  [] Yes  Action:  Comments: Patient arrives to therapy reporting moderate low back and R hip pain this date. Pt continues to report intermittent numbness/tingling down R leg to R knee from standing for long periods of time. Pt mentions feeling very sore after therapy last session stating she thinks she might have \"over did it\" with all of the bridges. Pt also states she has not been compliant with HEP due to being sick last week.    Objective:  Modalities:   Precautions:   Exercises:  Exercise Reps/ Time Weight/ Level Comments Completed    Nu step/sci fit  5' L 2.5  x                 Prone        Quadruped stretch 3 x 10 sec Felt good Prone hip ext 10 x 2       Prone on elbows  2 min       Alt UE's  10x      Hip flexor + quad stretch 2 x 30 sec    x   Quadruped   Add as able                          Seated       Hs stretch  2x30\" Stool  x   lumbar flexion  10x  reaching towards floor     Lumbar rotation  10x  Arms crossed on chest x   TrA 10x3\"      Marches with TrA 10x      SB roll outs   lateral flex w/rot 5x5-10\"  Roll to L to stretch R side x                 Supine        Hs stretch  2x20\" Strap  Paired with contract relax MET 3 x 5 sec    Piriformis stretch  3x30\"  Pull across to opp shoulder, push down x   TrA in hooklying  10x3\"      SLR with TrA 10x   x   Bridge  2x10   x   Bridge with alt march  10x      Bridge with butterfly  2x10  With abduction isometric     Heel walk outs 10x  PPT    Reverse curls  10x  Added 9/29                         Side lying        Hip abd  10x2   x   Clamshell 10x 2   lime   x                 Gym        Gastroc stretch 3x20\"  Added 10/6 x   Standing marches 20x  Added 10/12 x   Standing hip abd 20x  Added 10/12 x   Standing hip ext 20x  Added 10/12 x   Mini squat  10x  Added 10/12 x   Palloff press  10x ea dir Blue TB Added 10/6 x   Leg press 10x2 20#  x   lumbar ext machine  10x2 1pl + 5 lb  Total=25#   x   Other:      HEP:   Supine Bridge   Hooklying Hamstring Stretch with Strap   Seated March   Clamshell with Resistance         Treatment Charges: Mins Units   []  Modalities     []  Ther Exercise 40 3   []  Manual Therapy     []  Ther Activities     []  Aquatics     []  Vasocompression     []  Other     Total Treatment time 40 3       Assessment: [x] Progressing toward goals. Continued with exercises per log with overall good tolerance. Added standing hip ext, abd, marches, and mini squat to progress hip strength with fair tolerance providing verbal cues for core activation with fair carroyver.  Pt mentions that she tends to bear more weight through L LE during standing to offload the R LE, encouraged pt to maintain equal weight through LE with fair carryover. Pt continues to reporting discomfort with bridges after 2 sets and then discontinued due to pt feeling like her low back was tightening up. Pt reports no change in pain at end of treatment. [] No change. [] Other:  [x] Patient would continue to benefit from skilled physical therapy services in order to: improve lumbar strength, decrease risk of falls, improve core strength, improve function. STG/LTG  Problems:    [x] ? Pain:  [x] ? ROM:  [x] ? Strength:  [x] ? Function:  [] Other:       STG: (to be met in 9 treatments)  1. ? Pain: Lumbar pain improve to 4/10 at max with standing  2. ? ROM: Bilateral hamstring flexibility improve to lacking 25 degrees or less  3. ? Strength: Patient able to SLS each leg x 6 seconds without LOB or hip drop  4. ? Function: Patient to report improved sleep to 3 hours without waking due to back pain  5. Patient to be independent with home exercise program as demonstrated by performance with correct form without cues. 6. Demonstrate Knowledge of fall prevention  LTG: (to be met in 18 treatments)  1. Lumbar pain improve to 2/10 at max after doing a day full of household chores  2. Bilateral hamstring flexibility improve to lacking 20 degrees or less  3. Patient to report resolution of leg numbness  4. Oswestry score improve to lacking 30% functionally impaired or less  5. Patient able to sleep 5 hours without waking due to back pain. Patient goals: \"To figure out what the pain is\"    Pt. Education:  [x] Yes  [] No  [x] Reviewed Prior HEP/Ed  Method of Education: [x] Verbal  [x] Demo  [x] Written    Access Code: [email or web address]  URL: [email or web address]  Date: 10/01/2021  Prepared by:  Bev Aguiar    Exercises  Supine Bridge - 2 x daily - 7 x weekly - 3 sets - 10 reps  Hooklying Hamstring Stretch with Strap - 2 x daily - 7 x weekly - 1 sets - 3 reps - 30 sec hold  Seated March - 2 x daily - 7 x weekly - 3 sets - 10 reps  Clamshell with Resistance - 2 x daily - 7 x weekly - 3 sets - 10 reps      Comprehension of Education:  [x] Verbalizes understanding. [x] Demonstrates understanding. [] Needs review. [x] Demonstrates/verbalizes HEP/Ed previously given. Plan: [x] Continue current frequency toward long and short term goals. [x] Specific Instructions for subsequent treatments: Stretches to low back. DLS for core strength.    3 way hip with band      Time In: 9:55am    Time Out: 10:40 am    Electronically signed by:  Chetan Arteaga PTA

## 2021-10-15 ENCOUNTER — HOSPITAL ENCOUNTER (OUTPATIENT)
Dept: PHYSICAL THERAPY | Age: 36
Setting detail: THERAPIES SERIES
Discharge: HOME | End: 2021-10-15
Payer: COMMERCIAL

## 2021-10-15 PROCEDURE — 97110 THERAPEUTIC EXERCISES: CPT

## 2021-10-15 NOTE — FLOWSHEET NOTE
[x] [facility]) [facility] CENTER &  Therapy  [address]  [phone]  F: [phone] [] [address]  [facility]   [address]  P: [phone]  F: [phone] [] [address]  [address] [facility]  P: [phone]  F: [phone] [] [address]  P: [phone]  F: [phone] [] [address]  [facility]   [address]   [place]: [phone]  F: [phone]      Physical Therapy Daily Treatment Note    Date:  10/15/2021  Patient Name:  Edwina Vazquez    :  1985  MRN: [id number]  Physician: Bruce Euceda PA-C                               Insurance: [facility]   Medical Diagnosis: low back pain                  Rehab Codes: M 54.5, M 62.830, M 62.81, Z 91.81  Onset Date: 21                 Next 's appt.: 21  Approval was received for 12 visits, from 21 to 10/13/21. Authorization number [id number]. Visit# / total visits:      Cancels/No Shows: 0    Subjective:    Pain:  [x] Yes  [] No Location: LBP  Pain Rating: (0-10 scale) --/10  Pain altered Tx:  [x] No  [] Yes  Action:  Comments:  Patient arrives stating feeling well overall and denies pain in back this date as well. Reports adherence to HEP at home.        Objective:  Modalities:   Precautions:   Exercises:  Exercise Reps/ Time Weight/ Level Comments Completed    Nu step/sci fit  5' L 2.5  x                 Prone        Quadruped stretch 3 x 10 sec Felt good    Prone hip ext 15x ea 2lb Added weight 10/15 x   Prone on elbows  2 min       Glut iso 10x3\"  Added 10/15 x   Alt UE's  10x      Hip flexor + quad stretch 2 x 30 sec    x   Quadruped   Add as able                          Seated       Hs stretch  2x30\" Stool     lumbar flexion  10x  reaching towards floor     Lumbar rotation  10x  Arms crossed on chest    TrA 10x3\"      Marches with TrA 10x      SB roll outs   lateral flex w/rot 5x5-10\"  Roll to L to stretch R side x   LAQ 2x10 2lbs Added 10/15 x          Supine        Hs stretch  2x20\" Strap  Paired with contract relax MET 3 x 5 sec    Piriformis stretch  3x30\"  Pull across to opp shoulder, push down x   TrA in hooklying  10x3\"      SLR with TrA 15x 2lb Added 10/15 x   Bridge  2x10      Bridge with alt march  10x      Bridge with butterfly  2x10  With abduction isometric     March with PPT 2x10 2lb  x   Reverse curls  10x  Added 9/29                         Side lying        Hip abd  10x2 2lb Added weight 10/15 x   Clamshell 10x 2   lime   x                 Gym        Gastroc stretch 3x20\"   x   Standing marches 20x   x   Standing hip abd 20x   x   Standing hip ext 20x   x   Mini squat  10x   x   Palloff press  2x10 ea dir Blue TB  x   Palloff press with twist 10x ea Blue  x   Leg press 10x2 20#  x   lumbar ext machine  10x2 1pl + 5 lb  Total=25#      Other:      HEP:   Supine Bridge   Hooklying Hamstring Stretch with Strap   Seated March   Clamshell with Resistance         Treatment Charges: Mins Units   []  Modalities     []  Ther Exercise 40 3   []  Manual Therapy     []  Ther Activities     []  Aquatics     []  Vasocompression     []  Other     Total Treatment time 40 3       Assessment: [x] Progressing toward goals. Patient with good tolerance to program as noted above with resistance and exercise progressions this date. Patient noted increased difficulty with SLR with addition of weight, however able to complete with rest. Added prone glut iso and weight to hip extensions for increased glut strength as bridges were held. [] No change. [x] Other: Held all bridges today as patient notes continued difficulty and increased symptoms in low back - \"locking\" in previous sessions.    [x] Patient would continue to benefit from skilled physical therapy services in order to: improve lumbar strength, decrease risk of falls, improve core strength, improve function. STG/LTG  Problems:    [x] ? Pain:  [x] ? ROM:  [x] ? Strength:  [x] ? Function:  [] Other:       STG: (to be met in 9 treatments)  1. ? Pain: Lumbar pain improve to 4/10 at max with standing  2. ? ROM: Bilateral hamstring flexibility improve to lacking 25 degrees or less  3. ? Strength: Patient able to SLS each leg x 6 seconds without LOB or hip drop  4. ? Function: Patient to report improved sleep to 3 hours without waking due to back pain  5. Patient to be independent with home exercise program as demonstrated by performance with correct form without cues. 6. Demonstrate Knowledge of fall prevention  LTG: (to be met in 18 treatments)  1. Lumbar pain improve to 2/10 at max after doing a day full of household chores  2. Bilateral hamstring flexibility improve to lacking 20 degrees or less  3. Patient to report resolution of leg numbness  4. Oswestry score improve to lacking 30% functionally impaired or less  5. Patient able to sleep 5 hours without waking due to back pain. Patient goals: \"To figure out what the pain is\"    Pt. Education:  [x] Yes  [] No  [x] Reviewed Prior HEP/Ed  Method of Education: [x] Verbal  [x] Demo  [x] Written    Access Code: [email or web address]  URL: [email or web address]  Date: 10/01/2021  Prepared by: Henrietta Kaur    Exercises  Supine Bridge - 2 x daily - 7 x weekly - 3 sets - 10 reps  Hooklying Hamstring Stretch with Strap - 2 x daily - 7 x weekly - 1 sets - 3 reps - 30 sec hold  Seated March - 2 x daily - 7 x weekly - 3 sets - 10 reps  Clamshell with Resistance - 2 x daily - 7 x weekly - 3 sets - 10 reps      Comprehension of Education:  [x] Verbalizes understanding. [x] Demonstrates understanding. [] Needs review. [x] Demonstrates/verbalizes HEP/Ed previously given. Plan: [] Continue current frequency toward long and short term goals.  -- Auth date extension to be submitted 10/15/21, patient to be contacted when extension received. [x] Specific Instructions for subsequent treatments: Stretches to low back. DLS for core strength.    3 way hip with band      Time In: 10:55am    Time Out: 1135 am    Electronically signed by:  Mendel Andrews PTA

## 2021-10-22 ENCOUNTER — HOSPITAL ENCOUNTER (OUTPATIENT)
Age: 36
Discharge: HOME | End: 2021-10-22
Payer: COMMERCIAL

## 2021-10-22 LAB
ANION GAP SERPL CALCULATED.3IONS-SCNC: 18 MMOL/L (ref 9–17)
BUN BLDV-MCNC: 11 MG/DL (ref 6–20)
BUN/CREAT BLD: ABNORMAL (ref 9–20)
CALCIUM SERPL-MCNC: 9.6 MG/DL (ref 8.6–10.4)
CHLORIDE BLD-SCNC: 102 MMOL/L (ref 98–107)
CO2: 20 MMOL/L (ref 20–31)
CREAT SERPL-MCNC: 0.62 MG/DL (ref 0.5–0.9)
GFR AFRICAN AMERICAN: >60 ML/MIN
GFR NON-AFRICAN AMERICAN: >60 ML/MIN
GFR SERPL CREATININE-BSD FRML MDRD: ABNORMAL ML/MIN/{1.73_M2}
GFR SERPL CREATININE-BSD FRML MDRD: ABNORMAL ML/MIN/{1.73_M2}
GLUCOSE BLD-MCNC: 100 MG/DL (ref 70–99)
POTASSIUM SERPL-SCNC: 4.2 MMOL/L (ref 3.7–5.3)
SODIUM BLD-SCNC: 140 MMOL/L (ref 135–144)

## 2021-10-22 PROCEDURE — 80048 BASIC METABOLIC PNL TOTAL CA: CPT

## 2021-10-22 PROCEDURE — 36415 COLL VENOUS BLD VENIPUNCTURE: CPT

## 2021-10-28 ENCOUNTER — HOSPITAL ENCOUNTER (OUTPATIENT)
Dept: PHYSICAL THERAPY | Age: 36
Setting detail: THERAPIES SERIES
Discharge: HOME | End: 2021-10-28
Payer: COMMERCIAL

## 2021-10-28 PROCEDURE — 97110 THERAPEUTIC EXERCISES: CPT

## 2021-10-28 NOTE — FLOWSHEET NOTE
[x] [facility]) [facility] &  Therapy  [address]  [phone]  F: [phone] [] [address]  [place] [address]   [address]  P: [phone]  F: [phone] [] [place]  [address]  P: [phone]  F: [phone] [] [address]  P: [phone]  F: [phone] [] [address]  [facility]   [address]   [place]: [phone]  F: [phone]      Physical Therapy Daily Treatment Note    Date:  10/28/2021  Patient Name:  Izola Dandy    :  1985  MRN: [id number]  Physician: Sunday Sadler PA-C                               Insurance: [facility]   Medical Diagnosis: low back pain                  Rehab Codes: M 54.5, M 62.830, M 62.81, Z 91.81  Onset Date: 21                 Next 's appt.: 21  Approval was received for 12 visits, from 21 to 10/13/21. Authorization number [id number]. Date extension approved 21 - 21: updated 10/28/21  Visit# / total visits:      Cancels/No Shows: 0    Subjective:    Pain:  [x] Yes  [] No Location: LBP  Pain Rating: (0-10 scale) 3/10  Pain altered Tx:  [x] No  [] Yes  Action:  Comments:  Patient states she is feeling okay since lapse in sessions due to insurance extension.  Notes some soreness in the back at arrival.        Objective:  Modalities:   Precautions:   Exercises:  Exercise Reps/ Time Weight/ Level Comments Completed    Nu step/sci fit  5' L 2.5  x                 Prone        Quadruped stretch 3 x 10 sec Felt good    Prone hip ext 15x ea 2lb  x   Prone on elbows  2 min       Glut iso 10x3\"      Alt UE's  10x      Hip flexor + quad stretch 3 x 30 sec    x   Quadruped   Add as able                          Seated       Hs stretch 2x30\" Stool     lumbar flexion  10x  reaching towards floor     Lumbar rotation  10x  Arms crossed on chest    TrA 10x3\"      Marches with TrA 10x      SB roll outs   lateral flex w/rot 5x5-10\"  Roll to L to stretch R side x   LAQ 2x10 2lbs Added 10/15 x          Supine        Hs stretch  2x20\" Strap  Paired with contract relax MET 3 x 5 sec    Piriformis stretch  3x30\"  Pull across to opp shoulder, push down x   TrA in hooklying  10x3\"      SLR with TrA 15x 2lb Added 10/15 x   Bridge  2x10      Bridge with alt march  10x      Bridge with butterfly  2x10  With abduction isometric     March with PPT 2x10 2lb  x   Reverse curls  10x  Added 9/29                         Side lying        Hip abd  10x2 2lb  x   Clamshell 10x 2  lime   x                 Gym        Gastroc stretch 3x20\"   x   Standing marches 20x   x   Standing hip abd 20x   x   Standing hip ext 20x   x   Mini squat  2x10  Increased reps 10/28 x   Palloff press  2x10 ea dir Blue TB     Palloff press with twist 15x ea Blue Increased reps 10/28 x   Leg press 1x10  1x10 20#  30# Increased weight 10/28 x   lumbar ext machine  10x2 1pl + 5 lb  Total=25#      Other:      HEP:   Supine Bridge   Hooklying Hamstring Stretch with Strap   Seated March   Clamshell with Resistance         Treatment Charges: Mins Units   []  Modalities     []  Ther Exercise 40 3   []  Manual Therapy     []  Ther Activities     []  Aquatics     []  Vasocompression     []  Other     Total Treatment time 40 3       Assessment: [x] Progressing toward goals. Patient with good tolerance to program as completed above. Minimal progressions with reps and resistance this date as patient with short lapse in sessions, however with fair recall for technique. Patient noted no exacerbation of soreness or onset of pain throughout treatment this date and stated improvement of symptoms post treatment. [] No change.       [] Other:    [x] Patient would continue to benefit from skilled physical therapy services in order to: improve lumbar strength, decrease risk of falls, improve core strength, improve function. STG/LTG  Problems:    [x] ? Pain:  [x] ? ROM:  [x] ? Strength:  [x] ? Function:  [] Other:       STG: (to be met in 9 treatments)  1. ? Pain: Lumbar pain improve to 4/10 at max with standing  2. ? ROM: Bilateral hamstring flexibility improve to lacking 25 degrees or less  3. ? Strength: Patient able to SLS each leg x 6 seconds without LOB or hip drop  4. ? Function: Patient to report improved sleep to 3 hours without waking due to back pain  5. Patient to be independent with home exercise program as demonstrated by performance with correct form without cues. 6. Demonstrate Knowledge of fall prevention  LTG: (to be met in 18 treatments)  1. Lumbar pain improve to 2/10 at max after doing a day full of household chores  2. Bilateral hamstring flexibility improve to lacking 20 degrees or less  3. Patient to report resolution of leg numbness  4. Oswestry score improve to lacking 30% functionally impaired or less  5. Patient able to sleep 5 hours without waking due to back pain. Patient goals: \"To figure out what the pain is\"    Pt. Education:  [x] Yes  [] No  [x] Reviewed Prior HEP/Ed  Method of Education: [x] Verbal  [x] Demo  [x] Written    Access Code: [email or web address]  URL: [email or web address]  Date: 10/01/2021  Prepared by: Rory Regan    Exercises  Supine Bridge - 2 x daily - 7 x weekly - 3 sets - 10 reps  Hooklying Hamstring Stretch with Strap - 2 x daily - 7 x weekly - 1 sets - 3 reps - 30 sec hold  Seated March - 2 x daily - 7 x weekly - 3 sets - 10 reps  Clamshell with Resistance - 2 x daily - 7 x weekly - 3 sets - 10 reps      Comprehension of Education:  [x] Verbalizes understanding. [x] Demonstrates understanding. [] Needs review. [x] Demonstrates/verbalizes HEP/Ed previously given. Plan: [] Continue current frequency toward long and short term goals.  -- Auth date extension to be submitted 10/15/21, patient to be contacted when extension received. [x] Specific Instructions for subsequent treatments: Stretches to low back. DLS for core strength.    3 way hip with band      Time In: 844 am    Time Out: 930 am    Electronically signed by:  Daniel Rodgers PTA

## 2021-11-02 ENCOUNTER — HOSPITAL ENCOUNTER (OUTPATIENT)
Dept: PHYSICAL THERAPY | Age: 36
Setting detail: THERAPIES SERIES
Discharge: HOME | End: 2021-11-02
Payer: COMMERCIAL

## 2021-11-02 PROCEDURE — 97110 THERAPEUTIC EXERCISES: CPT

## 2021-11-02 NOTE — FLOWSHEET NOTE
[x] [facility]) [facility] CENTER &  Therapy  [address]  [phone]  F: [phone] [] [address]  [place] [address]   [address]  P: [phone]  F: [phone] [] [place]  [address]  P: [phone]  F: [phone] [] [address]  P: [phone]  F: [phone] [] [address]  [facility]   [address]   [place]: [phone]  F: [phone]      Physical Therapy Daily Treatment Note    Date:  2021  Patient Name:  Azalia Eisenmenger    :  1985  MRN: [id number]  Physician: Nichole Higginbotham PA-C                               Insurance: [facility]   Medical Diagnosis: low back pain                  Rehab Codes: M 54.5, M 62.830, M 62.81, Z 91.81  Onset Date: 21                 Next 's appt.: 21  Approval was received for 12 visits, from 21 to 10/13/21. Authorization number [id number]. Date extension approved 21 - 21: updated 10/28/21  Visit# / total visits:      Cancels/No Shows: 0    Subjective:    Pain:  [x] Yes  [] No Location: LBP  Pain Rating: (0-10 scale) 3.5/10  Pain altered Tx:  [x] No  [] Yes  Action:  Comments:  Patient states increased pain level this date, unsure as to why. States across whole low back as well, not just one side. Reports she trialed some stretches at home to reduce symptoms, but pain has been keeping her awake at night the past few nights.          Objective:  Modalities:   Precautions:   Exercises:  Exercise Reps/ Time Weight/ Level Comments Completed    Nu step/sci fit  5' L 2.5  x                 Prone        Quadruped stretch 3 x 10 sec Felt good    Prone hip ext 15x ea 2lb  x   Prone on elbows  2 min       Glut iso 10x3\"      Alt UE's  10x Hip flexor + quad stretch 3 x 30 sec    x   Quadruped   Add as able                          Seated       Hs stretch  2x30\" Stool     lumbar flexion  10x  reaching towards floor     Lumbar rotation  10x  Arms crossed on chest    TrA 10x3\"      Marches with TrA 10x      SB roll outs   lateral flex w/rot 5x5-10\"  Roll to L to stretch R side x   LAQ 2x10 2lbs  x          Supine        Hs stretch  2x20\" Strap  Paired with contract relax MET 3 x 5 sec    Piriformis stretch  3x30\"  Pull across to opp shoulder, push down x   TrA in hooklying  10x3\"      SLR with TrA 15x 2lb  x   Bridge  2x10      Bridge with alt march  10x      Bridge with butterfly  2x10  With abduction isometric     March with PPT 2x10 2lb     Reverse curls  10x  Added 9/29                         Side lying        Hip abd  10x2 2lb  x   Clamshell 10x 2  Blue Increased band 11/2 x                 Gym        Gastroc stretch 3x20\"   x   HS stretch 3x20'  Stool x   Standing marches 20x 2lb  x   3 way hip with band 15x ea Lime/2lb  Modified to tband 11/2 x   Lateral step ups 10x ea 2lb/6\" Added 11/2 x   Mini squat  2x10      Palloff press  2x10 ea dir Blue TB     Palloff press with twist 15x ea Blue  x   Leg press 1x10  1x10 20#  30#  x   lumbar ext machine  10x2 1pl + 5 lb  Total=25#      Other:      HEP:   Supine Bridge   Hooklying Hamstring Stretch with Strap   Seated March   Clamshell with Resistance         Treatment Charges: Mins Units   []  Modalities     [x]  Ther Exercise 45 3   []  Manual Therapy     []  Ther Activities     []  Aquatics     []  Vasocompression     []  Other     Total Treatment time 45 3       Assessment: [x] Progressing toward goals. Addition of lateral step ups this date for increased hip strengthening bilaterally with good tolerance as well as modified 3 way hip to theraband resistance with good tolerance as well. Assessed STG as noted below with patient making minimal progress at this time. [x] No change.  Patient notes 30 sec hold  Seated March - 2 x daily - 7 x weekly - 3 sets - 10 reps  Clamshell with Resistance - 2 x daily - 7 x weekly - 3 sets - 10 reps      Comprehension of Education:  [x] Verbalizes understanding. [x] Demonstrates understanding. [] Needs review. [x] Demonstrates/verbalizes HEP/Ed previously given. Plan: [x] Continue current frequency toward long and short term goals. [x] Specific Instructions for subsequent treatments: Stretches to low back.   DLS for core strength.   hamstring flex STG      Time In: 1105 am    Time Out:  1155 am    Electronically signed by:  Rio Stone PTA

## 2021-11-04 ENCOUNTER — HOSPITAL ENCOUNTER (OUTPATIENT)
Dept: PHYSICAL THERAPY | Age: 36
Setting detail: THERAPIES SERIES
Discharge: HOME | End: 2021-11-04
Payer: COMMERCIAL

## 2021-11-04 NOTE — FLOWSHEET NOTE
[x] [facility]) [facility] &  Therapy  [address]    [phone]  F: [phone]   [] [address]  [facility]   [address]  P: [phone]  F: [phone]  [] [address]  [address]  P: [phone]  F: [phone] [] [address]  P: [phone]  F: [phone]  [] [address]  [facility]   [address]   [place]: [phone]  F: [phone]   [] [facility]  [address] [facility] [address]  [place]: [phone]   F: [phone]     Physical Therapy Cancel/No Show note    Date: 2021  Patient: Izola Dandy  : 1985  MRN: [id number]    Cancels/No Shows to date:      For today's appointment patient:    [x]  Cancelled    [] Rescheduled appointment    [] No-show     Reason given by patient:    [x]  Patient ill    []  Conflicting appointment    [] No transportation      [] Conflict with work    [] No reason given    [] Weather related    [] COVID-19    [x] Other:      Comments:  Patient called stating she has a fever this morning, will call about future appts. [x] Next appointment was confirmed at previous appt.     Electronically signed by: Zoë Patrick PTA

## 2021-11-09 ENCOUNTER — HOSPITAL ENCOUNTER (OUTPATIENT)
Dept: PHYSICAL THERAPY | Age: 36
Setting detail: THERAPIES SERIES
Discharge: HOME | End: 2021-11-09
Payer: COMMERCIAL

## 2021-11-09 PROCEDURE — 97110 THERAPEUTIC EXERCISES: CPT

## 2021-11-09 NOTE — FLOWSHEET NOTE
Seated       Hs stretch  2x30\" Stool     lumbar flexion  10x  reaching towards floor     Lumbar rotation  10x  Arms crossed on chest    TrA 10x3\"      Marches with TrA 10x      SB roll outs   lateral flex w/rot 5x5-10\"  Roll to L to stretch R side    LAQ 2x10 2lbs  x          Supine        Hs stretch  2x20\" Strap  Paired with contract relax MET 3 x 5 sec    Piriformis stretch  3x30\"  Pull across to opp shoulder, push down x   TrA in hooklying  10x3\"      SLR with TrA 15x 2lb  x   Bridge  10x   x   Bridge with alt march  10x      Bridge with butterfly  2x10  With abduction isometric     March with PPT 2x10 2lb     Reverse curls  10x      Clamshells 20x Blue Added 11/9 x                 Side lying        Hip abd  10x2 2lb  x   Clamshell 10x 2  Blue  x                 Gym        Gastroc stretch 3x20\"   x   HS stretch 3x20'  Stool x   Standing marches 20x 2lb  x   3 way hip with band 15x ea Lime/2lb   x   Lateral step ups 10x ea 2lb/6\"  x   Mini squat  2x10   x   Palloff press  2x10 ea dir Blue TB     Palloff press with twist 15x ea Blue  x   Leg press 2x10 30# Increased weight for both sets 11/9 x   lumbar ext machine  10x2 1pl + 5 lb  Total=25#      Other:      HEP:   Supine Bridge   Hooklying Hamstring Stretch with Strap   Seated March   Clamshell with Resistance         Treatment Charges: Mins Units   []  Modalities     [x]  Ther Exercise 40 3   []  Manual Therapy     []  Ther Activities     []  Aquatics     []  Vasocompression     []  Other     Total Treatment time 40 3       Assessment: [x] Progressing toward goals. Patient with good tolerance to all therapeutic exercises completed as noted this date with minimal progressions. Patient stated no increased or onset of pain/symptoms following treatment and minimal fatigue. Demonstrated improved posterior chain strength and overall stability this date throughout. Patient aware and agreeable next treatment is last.         [] No change.       [] Other: [x] Patient would continue to benefit from skilled physical therapy services in order to: improve lumbar strength, decrease risk of falls, improve core strength, improve function. STG/LTG  Problems:    [x] ? Pain:  [x] ? ROM:  [x] ? Strength:  [x] ? Function:  [] Other:       STG: (to be met in 9 treatments)  1. ? Pain: Lumbar pain improve to 4/10 at max with standing - NOT MET \"any longer than 20 minutes can shoot up to 7-8/10\"  2. ? ROM: Bilateral hamstring flexibility improve to lacking 25 degrees or less  3. ? Strength: Patient able to SLS each leg x 6 seconds without LOB or hip drop - MET  4. ? Function: Patient to report improved sleep to 3 hours without waking due to back pain - Not met - patient reports up to 2-3 hours at a time with minimal improvement since starting therapy  5. Patient to be independent with home exercise program as demonstrated by performance with correct form without cues. - MET  6. Demonstrate Knowledge of fall prevention -- MET 11/2/21    LTG: (to be met in 18 treatments)  1. Lumbar pain improve to 2/10 at max after doing a day full of household chores  2. Bilateral hamstring flexibility improve to lacking 20 degrees or less  3. Patient to report resolution of leg numbness  4. Oswestry score improve to lacking 30% functionally impaired or less  5. Patient able to sleep 5 hours without waking due to back pain. Patient goals: \"To figure out what the pain is\"    Pt. Education:  [x] Yes  [] No  [x] Reviewed Prior HEP/Ed  Method of Education: [x] Verbal  [x] Demo  [x] Written    Access Code: [email or web address]  URL: [email or web address]  Date: 10/01/2021  Prepared by:  Yuri Los    Exercises  Supine Bridge - 2 x daily - 7 x weekly - 3 sets - 10 reps  Hooklying Hamstring Stretch with Strap - 2 x daily - 7 x weekly - 1 sets - 3 reps - 30 sec hold  Seated March - 2 x daily - 7 x weekly - 3 sets - 10 reps  Clamshell with Resistance - 2 x daily - 7 x weekly - 3 sets - 10 reps      Comprehension of Education:  [x] Verbalizes understanding. [x] Demonstrates understanding. [] Needs review. [x] Demonstrates/verbalizes HEP/Ed previously given. Plan: [x] Continue current frequency toward long and short term goals. [x] Specific Instructions for subsequent treatments: Stretches to low back. DLS for core strength.  Review HEP and update as needed for discharge       Time In: 1055 am    Time Out: 1140  am    Electronically signed by:  Man Mansfield PTA

## 2021-11-11 ENCOUNTER — HOSPITAL ENCOUNTER (OUTPATIENT)
Dept: PHYSICAL THERAPY | Age: 36
Setting detail: THERAPIES SERIES
Discharge: HOME | End: 2021-11-11
Payer: COMMERCIAL

## 2021-11-11 PROCEDURE — 97110 THERAPEUTIC EXERCISES: CPT

## 2021-11-11 NOTE — FLOWSHEET NOTE
[x] [facility]) [facility] CENTER &  Therapy  [address]  [phone]  F: [phone] [] [address]  [place] [address]   [address]  P: [phone]  F: [phone] [] [place]  [address]  P: [phone]  F: [phone] [] [address]  P: [phone]  F: [phone] [] [address]  [facility]   [address]   [place]: [phone]  F: [phone]      Physical Therapy Daily Treatment Note    Date:  2021  Patient Name:  Micah Mercado    :  1985  MRN: [id number]  Physician: Shannan Hall PA-C                               Insurance: [employer]   [facility] Diagnosis: low back pain                  Rehab Codes: M 54.5, M 62.830, M 62.81, Z 91.81  Onset Date: 21                 Next 's appt.: 21  Approval was received for 12 visits, from 21 to 10/13/21. Authorization number [id number]. Date extension approved 21 - 21: updated 10/28/21  Visit# / total visits:      Cancels/No Shows: 0    Subjective:    Pain:  [x] Yes  [] No Location: LBP  Pain Rating: (0-10 scale) 1/10  Pain altered Tx:  [x] No  [] Yes  Action:  Comments:  Patient states she is feeling well and back is fine at arrival. Aware today is last day per insurance auth.           Objective:  Modalities:   Precautions:   Exercises:  Exercise Reps/ Time Weight/ Level Comments Completed    Nu step/sci fit  6' L 2.5  x                 Prone        Quadruped stretch 3 x 10 sec Felt good    Prone hip ext 2x10 ea 2lb  x   Prone on elbows  2 min       Glut iso 10x3\"      Alt UE's  10x      Hip flexor + quad stretch 3 x 30 sec                            Seated       Hs stretch  2x30\" Stool     lumbar flexion  10x  reaching towards floor     Lumbar rotation  10x  Arms crossed on chest    TrA 10x3\"      Marches with TrA 10x      SB roll outs   lateral flex w/rot 5x5-10\"  Roll to L to stretch R side    LAQ 2x10 2lbs  x          Supine        Hs stretch  2x20\" Strap  Paired with contract relax MET 3 x 5 sec    Piriformis stretch  3x30\"  Pull across to opp shoulder, push down x   TrA in hooklying  10x3\"      SLR with TrA 15x 2lb  x   Bridge  10x   x   Bridge with alt march  10x      Bridge with butterfly  2x10  With abduction isometric     March with PPT 2x10 2lb     Reverse curls  10x      Clamshells 20x Blue Added 11/9 x                 Side lying        Hip abd  10x2 2lb  x   Clamshell 10x 2  Blue  x                 Gym        Gastroc stretch 3x20\"   x   HS stretch 3x20'  Stool x   Standing marches 20x 2lb     3 way hip with band 15x ea Lime/2lb  Performed in loop for carryover to HEP 11/11 x   Lateral step ups 10x ea 2lb/6\"  x   Mini squat  2x10   x   Palloff press  2x10 ea dir Blue TB     Palloff press with twist 15x ea Blue  x   Leg press 2x10 30#  x   lumbar ext machine  10x2 1pl + 5 lb  Total=25#      Other:      HEP:   Supine Bridge   Hooklying Hamstring Stretch with Strap   Seated March   Clamshell with Resistance         Treatment Charges: Mins Units   []  Modalities     [x]  Ther Exercise 40 3   []  Manual Therapy     []  Ther Activities     []  Aquatics     []  Vasocompression     []  Other     Total Treatment time 40 3       Assessment: [x] Progressing toward goals. Reviewed therapeutic exercises as noted above for carryover to HEP with good verbal understanding for all. Issued written copy of HEP containing majority of exercises completed this date as well as lime and blueberry bands for use with 3 way hip and clamshells. Assess LTG as noted below with progress made towards majority of goals at this time. [] No change.       [] Other:    [x] Patient would continue to benefit from skilled physical therapy services in order to: improve lumbar strength, decrease risk of falls, improve core strength, improve function. STG/LTG  Problems:    [x] ? Pain:  [x] ? ROM:  [x] ? Strength:  [x] ? Function:  [] Other:       STG: (to be met in 9 treatments)  1. ? Pain: Lumbar pain improve to 4/10 at max with standing - NOT MET \"any longer than 20 minutes can shoot up to 7-8/10\"  2. ? ROM: Bilateral hamstring flexibility improve to lacking 25 degrees or less  3. ? Strength: Patient able to SLS each leg x 6 seconds without LOB or hip drop - MET  4. ? Function: Patient to report improved sleep to 3 hours without waking due to back pain - Not met - patient reports up to 2-3 hours at a time with minimal improvement since starting therapy  5. Patient to be independent with home exercise program as demonstrated by performance with correct form without cues. - MET  6. Demonstrate Knowledge of fall prevention -- MET 11/2/21    LTG: (to be met in 18 treatments)  1. Lumbar pain improve to 2/10 at max after doing a day full of household chores -- Not met, standing more than 30 minutes \"the pain comes\"   2. Bilateral hamstring flexibility improve to lacking 20 degrees or less - RLE MET - 15° LLE MET 20°  3. Patient to report resolution of leg numbness - Progress, was always come and go, but \"not as much, only when the back hurts\"  4. Oswestry score improve to lacking 30% functionally impaired or less - Progress - 40% impaired  5. Patient able to sleep 5 hours without waking due to back pain -- Progress, hasn't been waking up due to her back as much, just in general not sleeping well. Patient goals: \"To figure out what the pain is\"    Pt. Education:  [x] Yes  [] No  [x] Reviewed Prior HEP/Ed  Method of Education: [x] Verbal  [x] Demo  [x] Written    Access Code: [email or web address]  URL: [email or web address]  Date: 10/01/2021  Prepared by:  Bev Aguiar    Exercises  Supine Bridge - 2 x daily - 7 x weekly - 3 sets - 10 reps  Hooklying Hamstring Stretch with Strap - 2 x daily - 7 x weekly - 1 sets - 3 reps - 30 sec hold  Seated March - 2 x daily - 7 x weekly - 3 sets - 10 reps  Clamshell with Resistance - 2 x daily - 7 x weekly - 3 sets - 10 reps      Comprehension of Education:  [x] Verbalizes understanding. [x] Demonstrates understanding. [] Needs review. [x] Demonstrates/verbalizes HEP/Ed previously given. Plan: [] Continue current frequency toward long and short term goals. -- Discharge  [] Specific Instructions for subsequent treatments: Discharge.        Time In: 105 pm    Time Out:  150 pm    Electronically signed by:  Hazel Crump PTA

## 2021-11-12 NOTE — DISCHARGE SUMMARY
[x] [facility]) [facility] CENTER &  Therapy  [address]  [phone]  F: [phone] [] [address]  [facility]   [address]  P: [phone]  F: [phone] [] [place]  [address]  P: [phone]  F: [phone] [] [address]  P: [phone]  F: [phone] [] [address]  [facility]   [address]   [place]: [phone]  F: [phone]      Physical Therapy Discharge Note    Date: 2021      Patient: Kristine Daugherty  : 1985  MRN: [id number]: THUAN Ornelas PA-C                               Insurance: [facility] Diagnosis: low back pain                  Rehab Codes: M 54.5, M 62.830, M 62.81, Z 91.81  Onset Date: 21                 Next 's appt.: 21  Approval was received for 12 visits, from 21 to 10/13/21.  Authorization number [id number]. Date extension approved 21 - 21: updated 10/28/21  Visit# / total visits:                                 Cancels/No Shows: 0  Date of initial visit: 21                Date of final visit: 21    Subjective:    Pain:  [x]? Yes  []? No   Location: LBP              Pain Rating: (0-10 scale) 1/10  Pain altered Tx:  [x]? No  []? Yes  Action:  Comments:  Patient states she is feeling well and back is fine at arrival. Aware today is last day per insurance auth. Objective:  Test Measurements/Function: Patient is limited to standing 30 minutes before her lumbar pain beings. Bilateral hamstrings are more flexible - right lacking 15 degrees, left lacking 20 degrees. Leg numbness has improved - it always comes and goes, but it is now only when her back hurts.   Oswestry score of 40% functionally impaired. Sleep is fair, but her back is not waking her up as often. Assessment:  STG: (to be met in 9 treatments)  1. ? Pain: Lumbar pain improve to 4/10 at max with standing - NOT MET \"any longer than 20 minutes can shoot up to 7-8/10\"  2. ? ROM: Bilateral hamstring flexibility improve to lacking 25 degrees or less  3. ? Strength: Patient able to SLS each leg x 6 seconds without LOB or hip drop - MET  4. ? Function: Patient to report improved sleep to 3 hours without waking due to back pain - Not met - patient reports up to 2-3 hours at a time with minimal improvement since starting therapy  5. Patient to be independent with home exercise program as demonstrated by performance with correct form without cues. - MET  6. Demonstrate Knowledge of fall prevention -- MET 11/2/21     LTG: (to be met in 18 treatments)  1. Lumbar pain improve to 2/10 at max after doing a day full of household chores -- Not met, standing more than 30 minutes \"the pain comes\"   2. Bilateral hamstring flexibility improve to lacking 20 degrees or less - RLE MET - 15° LLE MET 20°  3. Patient to report resolution of leg numbness - Progress, was always come and go, but \"not as much, only when the back hurts\"  4. Oswestry score improve to lacking 30% functionally impaired or less - Progress - 40% impaired  5.  Patient able to sleep 5 hours without waking due to back pain -- Progress, hasn't been waking up due to her back as much, just in general not sleeping well.      Patient goals:  \"To figure out what the pain is\"    Treatment to Date:  [x] Therapeutic Exercise    [] Modalities:  [] Therapeutic Activity    [] Ultrasound  [] Electrical Stimulation  [] Gait Training     [] Massage       [] Lumbar/Cervical Traction  [] Neuromuscular Re-education [] Cold/hotpack [] Iontophoresis: 4 mg/mL  [x] Instruction in Home Exercise Program                     Dexamethasone Sodium  [] Manual Therapy             Phosphate 40-80 mAmin  [] Aquatic Therapy [] Vasocompression/    [] Other:             Game Ready    Discharge Status:     [x] Pt recovered from conditions. Treatment goals were met. [] Pt received maximum benefit. No further therapy indicated at this time. [x] Pt to continue exercise/home instructions independently. [x] Therapy interrupted due to: Insurance end date was 11/12/21 - patient wished to complete therapy at her last visit on 11/11/21. [] Pt has 2 or more no shows/cancels, is discontinued per our policy. [] Pt has completed prescribed number of treatment sessions. [] Other:         Electronically signed by Arelis Vargas PT on 11/12/2021 at 6:08 AM      If you have any questions or concerns, please don't hesitate to call.   Thank you for your referral.

## 2021-12-16 ENCOUNTER — HOSPITAL ENCOUNTER (OUTPATIENT)
Age: 36
Discharge: HOME | End: 2021-12-16
Payer: COMMERCIAL

## 2021-12-16 LAB
ALBUMIN SERPL-MCNC: 4.4 G/DL (ref 3.5–5.2)
ALBUMIN/GLOBULIN RATIO: 1.3 (ref 1–2.5)
ALP BLD-CCNC: 97 U/L (ref 35–104)
ALT SERPL-CCNC: 259 U/L (ref 5–33)
ANION GAP SERPL CALCULATED.3IONS-SCNC: 16 MMOL/L (ref 9–17)
AST SERPL-CCNC: 203 U/L
BILIRUB SERPL-MCNC: 0.66 MG/DL (ref 0.3–1.2)
BUN BLDV-MCNC: 12 MG/DL (ref 6–20)
BUN/CREAT BLD: ABNORMAL (ref 9–20)
CALCIUM SERPL-MCNC: 9.5 MG/DL (ref 8.6–10.4)
CHLORIDE BLD-SCNC: 103 MMOL/L (ref 98–107)
CHOLESTEROL, FASTING: 257 MG/DL
CHOLESTEROL/HDL RATIO: 5
CO2: 20 MMOL/L (ref 20–31)
CREAT SERPL-MCNC: 0.66 MG/DL (ref 0.5–0.9)
ESTIMATED AVERAGE GLUCOSE: 103 MG/DL
GFR AFRICAN AMERICAN: >60 ML/MIN
GFR NON-AFRICAN AMERICAN: >60 ML/MIN
GFR SERPL CREATININE-BSD FRML MDRD: ABNORMAL ML/MIN/{1.73_M2}
GFR SERPL CREATININE-BSD FRML MDRD: ABNORMAL ML/MIN/{1.73_M2}
GLUCOSE BLD-MCNC: 97 MG/DL (ref 70–99)
HBA1C MFR BLD: 5.2 % (ref 4–6)
HCT VFR BLD CALC: 45.8 % (ref 36.3–47.1)
HDLC SERPL-MCNC: 51 MG/DL
HEMOGLOBIN: 15.1 G/DL (ref 11.9–15.1)
IRON: 131 UG/DL (ref 37–145)
LDL CHOLESTEROL: 150 MG/DL (ref 0–130)
MCH RBC QN AUTO: 31.9 PG (ref 25.2–33.5)
MCHC RBC AUTO-ENTMCNC: 33 G/DL (ref 28.4–34.8)
MCV RBC AUTO: 96.6 FL (ref 82.6–102.9)
NRBC AUTOMATED: 0 PER 100 WBC
PDW BLD-RTO: 12.7 % (ref 11.8–14.4)
PLATELET # BLD: 284 K/UL (ref 138–453)
PMV BLD AUTO: 10.2 FL (ref 8.1–13.5)
POTASSIUM SERPL-SCNC: 3.8 MMOL/L (ref 3.7–5.3)
RBC # BLD: 4.74 M/UL (ref 3.95–5.11)
SODIUM BLD-SCNC: 139 MMOL/L (ref 135–144)
TOTAL PROTEIN: 7.7 G/DL (ref 6.4–8.3)
TRIGLYCERIDE, FASTING: 278 MG/DL
VITAMIN B-12: 363 PG/ML (ref 232–1245)
VITAMIN D 25-HYDROXY: 25.8 NG/ML (ref 30–100)
VLDLC SERPL CALC-MCNC: ABNORMAL MG/DL (ref 1–30)
WBC # BLD: 9.4 K/UL (ref 3.5–11.3)

## 2021-12-16 PROCEDURE — 80061 LIPID PANEL: CPT

## 2021-12-16 PROCEDURE — 82607 VITAMIN B-12: CPT

## 2021-12-16 PROCEDURE — 83540 ASSAY OF IRON: CPT

## 2021-12-16 PROCEDURE — 82306 VITAMIN D 25 HYDROXY: CPT

## 2021-12-16 PROCEDURE — 36415 COLL VENOUS BLD VENIPUNCTURE: CPT

## 2021-12-16 PROCEDURE — 83036 HEMOGLOBIN GLYCOSYLATED A1C: CPT

## 2021-12-16 PROCEDURE — 85027 COMPLETE CBC AUTOMATED: CPT

## 2021-12-16 PROCEDURE — 80053 COMPREHEN METABOLIC PANEL: CPT

## 2022-03-17 ENCOUNTER — HOSPITAL ENCOUNTER (OUTPATIENT)
Age: 37
Setting detail: SPECIMEN
Discharge: HOME | End: 2022-03-17
Payer: COMMERCIAL

## 2022-03-17 LAB
ALBUMIN SERPL-MCNC: 4.5 G/DL (ref 3.5–5.2)
ALBUMIN/GLOBULIN RATIO: 1.6 (ref 1–2.5)
ALP BLD-CCNC: 90 U/L (ref 35–104)
ALT SERPL-CCNC: 235 U/L (ref 5–33)
ANION GAP SERPL CALCULATED.3IONS-SCNC: 16 MMOL/L (ref 9–17)
AST SERPL-CCNC: 197 U/L
BILIRUB SERPL-MCNC: 0.71 MG/DL (ref 0.3–1.2)
BUN BLDV-MCNC: 13 MG/DL (ref 6–20)
CALCIUM SERPL-MCNC: 10.1 MG/DL (ref 8.6–10.4)
CHLORIDE BLD-SCNC: 103 MMOL/L (ref 98–107)
CHOLESTEROL, FASTING: 263 MG/DL
CHOLESTEROL/HDL RATIO: 4.8
CO2: 23 MMOL/L (ref 20–31)
CREAT SERPL-MCNC: 0.69 MG/DL (ref 0.5–0.9)
GFR AFRICAN AMERICAN: >60 ML/MIN
GFR NON-AFRICAN AMERICAN: >60 ML/MIN
GFR SERPL CREATININE-BSD FRML MDRD: ABNORMAL ML/MIN/{1.73_M2}
GLUCOSE BLD-MCNC: 92 MG/DL (ref 70–99)
HCT VFR BLD CALC: 45.8 % (ref 36.3–47.1)
HDLC SERPL-MCNC: 55 MG/DL
HEMOGLOBIN: 15.2 G/DL (ref 11.9–15.1)
IRON: 140 UG/DL (ref 37–145)
LDL CHOLESTEROL: 150 MG/DL (ref 0–130)
MCH RBC QN AUTO: 32.1 PG (ref 25.2–33.5)
MCHC RBC AUTO-ENTMCNC: 33.2 G/DL (ref 28.4–34.8)
MCV RBC AUTO: 96.8 FL (ref 82.6–102.9)
NRBC AUTOMATED: 0 PER 100 WBC
PDW BLD-RTO: 13.3 % (ref 11.8–14.4)
PLATELET # BLD: 277 K/UL (ref 138–453)
PMV BLD AUTO: 10.6 FL (ref 8.1–13.5)
POTASSIUM SERPL-SCNC: 4.3 MMOL/L (ref 3.7–5.3)
RBC # BLD: 4.73 M/UL (ref 3.95–5.11)
SODIUM BLD-SCNC: 142 MMOL/L (ref 135–144)
TOTAL PROTEIN: 7.4 G/DL (ref 6.4–8.3)
TRIGLYCERIDE, FASTING: 290 MG/DL
VITAMIN B-12: 400 PG/ML (ref 232–1245)
VITAMIN D 25-HYDROXY: 29.7 NG/ML
WBC # BLD: 11.2 K/UL (ref 3.5–11.3)

## 2022-03-17 PROCEDURE — 36415 COLL VENOUS BLD VENIPUNCTURE: CPT

## 2022-03-17 PROCEDURE — 83540 ASSAY OF IRON: CPT

## 2022-03-17 PROCEDURE — 80053 COMPREHEN METABOLIC PANEL: CPT

## 2022-03-17 PROCEDURE — 85027 COMPLETE CBC AUTOMATED: CPT

## 2022-03-17 PROCEDURE — 80061 LIPID PANEL: CPT

## 2022-03-17 PROCEDURE — 82306 VITAMIN D 25 HYDROXY: CPT

## 2022-03-17 PROCEDURE — 82607 VITAMIN B-12: CPT

## 2022-06-16 ENCOUNTER — HOSPITAL ENCOUNTER (OUTPATIENT)
Age: 37
Discharge: HOME | End: 2022-06-16
Payer: MEDICAID

## 2022-06-16 LAB
ALBUMIN SERPL-MCNC: 4.2 G/DL (ref 3.5–5.2)
ALBUMIN/GLOBULIN RATIO: 1.4 (ref 1–2.5)
ALP BLD-CCNC: 99 U/L (ref 35–104)
ALT SERPL-CCNC: 227 U/L (ref 5–33)
ANION GAP SERPL CALCULATED.3IONS-SCNC: 13 MMOL/L (ref 9–17)
AST SERPL-CCNC: 165 U/L
BILIRUB SERPL-MCNC: 0.58 MG/DL (ref 0.3–1.2)
BUN BLDV-MCNC: 10 MG/DL (ref 6–20)
CALCIUM SERPL-MCNC: 9.3 MG/DL (ref 8.6–10.4)
CHLORIDE BLD-SCNC: 102 MMOL/L (ref 98–107)
CHOLESTEROL/HDL RATIO: 4.8
CHOLESTEROL: 244 MG/DL
CO2: 22 MMOL/L (ref 20–31)
CREAT SERPL-MCNC: 0.63 MG/DL (ref 0.5–0.9)
ESTIMATED AVERAGE GLUCOSE: 105 MG/DL
GFR AFRICAN AMERICAN: >60 ML/MIN
GFR NON-AFRICAN AMERICAN: >60 ML/MIN
GFR SERPL CREATININE-BSD FRML MDRD: ABNORMAL ML/MIN/{1.73_M2}
GLUCOSE BLD-MCNC: 95 MG/DL (ref 70–99)
HBA1C MFR BLD: 5.3 % (ref 4–6)
HCT VFR BLD CALC: 43.5 % (ref 36.3–47.1)
HDLC SERPL-MCNC: 51 MG/DL
HEMOGLOBIN: 15.1 G/DL (ref 11.9–15.1)
IRON: 127 UG/DL (ref 37–145)
LDL CHOLESTEROL: 147 MG/DL (ref 0–130)
MCH RBC QN AUTO: 33.1 PG (ref 25.2–33.5)
MCHC RBC AUTO-ENTMCNC: 34.7 G/DL (ref 28.4–34.8)
MCV RBC AUTO: 95.4 FL (ref 82.6–102.9)
NRBC AUTOMATED: 0 PER 100 WBC
PDW BLD-RTO: 13.2 % (ref 11.8–14.4)
PLATELET # BLD: 242 K/UL (ref 138–453)
PMV BLD AUTO: 10.9 FL (ref 8.1–13.5)
POTASSIUM SERPL-SCNC: 4.1 MMOL/L (ref 3.7–5.3)
RBC # BLD: 4.56 M/UL (ref 3.95–5.11)
SODIUM BLD-SCNC: 137 MMOL/L (ref 135–144)
TOTAL PROTEIN: 7.2 G/DL (ref 6.4–8.3)
TRIGL SERPL-MCNC: 229 MG/DL
TSH SERPL DL<=0.05 MIU/L-ACNC: 1.56 UIU/ML (ref 0.3–5)
VITAMIN D 25-HYDROXY: 32 NG/ML
WBC # BLD: 9.6 K/UL (ref 3.5–11.3)

## 2022-06-16 PROCEDURE — 36415 COLL VENOUS BLD VENIPUNCTURE: CPT

## 2022-06-16 PROCEDURE — 80061 LIPID PANEL: CPT

## 2022-06-16 PROCEDURE — 84443 ASSAY THYROID STIM HORMONE: CPT

## 2022-06-16 PROCEDURE — 85027 COMPLETE CBC AUTOMATED: CPT

## 2022-06-16 PROCEDURE — 82306 VITAMIN D 25 HYDROXY: CPT

## 2022-06-16 PROCEDURE — 83036 HEMOGLOBIN GLYCOSYLATED A1C: CPT

## 2022-06-16 PROCEDURE — 82607 VITAMIN B-12: CPT

## 2022-06-16 PROCEDURE — 80053 COMPREHEN METABOLIC PANEL: CPT

## 2022-06-16 PROCEDURE — 83540 ASSAY OF IRON: CPT

## 2022-06-17 LAB — VITAMIN B-12: 336 PG/ML (ref 232–1245)

## 2022-06-22 ENCOUNTER — HOSPITAL ENCOUNTER (OUTPATIENT)
Age: 37
Discharge: HOME | End: 2022-06-24
Payer: MEDICAID

## 2022-06-22 ENCOUNTER — HOSPITAL ENCOUNTER (OUTPATIENT)
Dept: GENERAL RADIOLOGY | Age: 37
Discharge: HOME | End: 2022-06-24
Payer: MEDICAID

## 2022-06-22 DIAGNOSIS — R05.9 COUGH: ICD-10-CM

## 2022-06-22 PROCEDURE — 71046 X-RAY EXAM CHEST 2 VIEWS: CPT

## 2022-09-19 ENCOUNTER — HOSPITAL ENCOUNTER (OUTPATIENT)
Age: 37
Discharge: HOME | End: 2022-09-19
Payer: MEDICAID

## 2022-09-19 LAB
ALBUMIN SERPL-MCNC: 4 G/DL (ref 3.5–5.2)
ALBUMIN/GLOBULIN RATIO: 1.1 (ref 1–2.5)
ALP BLD-CCNC: 100 U/L (ref 35–104)
ALT SERPL-CCNC: 127 U/L (ref 5–33)
ANION GAP SERPL CALCULATED.3IONS-SCNC: 13 MMOL/L (ref 9–17)
AST SERPL-CCNC: 96 U/L
BILIRUB SERPL-MCNC: 0.6 MG/DL (ref 0.3–1.2)
BUN BLDV-MCNC: 9 MG/DL (ref 6–20)
CALCIUM SERPL-MCNC: 9.3 MG/DL (ref 8.6–10.4)
CHLORIDE BLD-SCNC: 102 MMOL/L (ref 98–107)
CHOLESTEROL, FASTING: 265 MG/DL
CHOLESTEROL/HDL RATIO: 5.4
CO2: 23 MMOL/L (ref 20–31)
CREAT SERPL-MCNC: 0.64 MG/DL (ref 0.5–0.9)
ESTIMATED AVERAGE GLUCOSE: 100 MG/DL
GFR AFRICAN AMERICAN: >60 ML/MIN
GFR NON-AFRICAN AMERICAN: >60 ML/MIN
GFR SERPL CREATININE-BSD FRML MDRD: ABNORMAL ML/MIN/{1.73_M2}
GLUCOSE BLD-MCNC: 95 MG/DL (ref 70–99)
HBA1C MFR BLD: 5.1 % (ref 4–6)
HCT VFR BLD CALC: 44 % (ref 36.3–47.1)
HDLC SERPL-MCNC: 49 MG/DL
HEMOGLOBIN: 14.9 G/DL (ref 11.9–15.1)
IRON: 130 UG/DL (ref 37–145)
LDL CHOLESTEROL: 153 MG/DL (ref 0–130)
MCH RBC QN AUTO: 33.3 PG (ref 25.2–33.5)
MCHC RBC AUTO-ENTMCNC: 33.9 G/DL (ref 28.4–34.8)
MCV RBC AUTO: 98.4 FL (ref 82.6–102.9)
NRBC AUTOMATED: 0 PER 100 WBC
PDW BLD-RTO: 13 % (ref 11.8–14.4)
PLATELET # BLD: 255 K/UL (ref 138–453)
PMV BLD AUTO: 10.3 FL (ref 8.1–13.5)
POTASSIUM SERPL-SCNC: 4.1 MMOL/L (ref 3.7–5.3)
RBC # BLD: 4.47 M/UL (ref 3.95–5.11)
SODIUM BLD-SCNC: 138 MMOL/L (ref 135–144)
TOTAL PROTEIN: 7.5 G/DL (ref 6.4–8.3)
TRIGLYCERIDE, FASTING: 313 MG/DL
VITAMIN B-12: 321 PG/ML (ref 232–1245)
WBC # BLD: 10.8 K/UL (ref 3.5–11.3)

## 2022-09-19 PROCEDURE — 83540 ASSAY OF IRON: CPT

## 2022-09-19 PROCEDURE — 85027 COMPLETE CBC AUTOMATED: CPT

## 2022-09-19 PROCEDURE — 83036 HEMOGLOBIN GLYCOSYLATED A1C: CPT

## 2022-09-19 PROCEDURE — 36415 COLL VENOUS BLD VENIPUNCTURE: CPT

## 2022-09-19 PROCEDURE — 82607 VITAMIN B-12: CPT

## 2022-09-19 PROCEDURE — 80053 COMPREHEN METABOLIC PANEL: CPT

## 2022-09-19 PROCEDURE — 80061 LIPID PANEL: CPT

## 2022-12-19 ENCOUNTER — HOSPITAL ENCOUNTER (OUTPATIENT)
Age: 37
Discharge: HOME | End: 2022-12-19
Payer: MEDICAID

## 2022-12-19 LAB
ALBUMIN SERPL-MCNC: 4.2 G/DL (ref 3.5–5.2)
ALBUMIN/GLOBULIN RATIO: 1.2 (ref 1–2.5)
ALP BLD-CCNC: 109 U/L (ref 35–104)
ALT SERPL-CCNC: 207 U/L (ref 5–33)
ANION GAP SERPL CALCULATED.3IONS-SCNC: 13 MMOL/L (ref 9–17)
AST SERPL-CCNC: 194 U/L
BILIRUB SERPL-MCNC: 0.6 MG/DL (ref 0.3–1.2)
BUN BLDV-MCNC: 9 MG/DL (ref 6–20)
CALCIUM SERPL-MCNC: 9 MG/DL (ref 8.6–10.4)
CHLORIDE BLD-SCNC: 106 MMOL/L (ref 98–107)
CHOLESTEROL, FASTING: 278 MG/DL
CHOLESTEROL/HDL RATIO: 5.5
CO2: 24 MMOL/L (ref 20–31)
CREAT SERPL-MCNC: 0.66 MG/DL (ref 0.5–0.9)
ESTIMATED AVERAGE GLUCOSE: 97 MG/DL
GFR SERPL CREATININE-BSD FRML MDRD: >60 ML/MIN/1.73M2
GLUCOSE BLD-MCNC: 93 MG/DL (ref 70–99)
HBA1C MFR BLD: 5 % (ref 4–6)
HCT VFR BLD CALC: 44.7 % (ref 36.3–47.1)
HDLC SERPL-MCNC: 51 MG/DL
HEMOGLOBIN: 15 G/DL (ref 11.9–15.1)
IRON: 125 UG/DL (ref 37–145)
LDL CHOLESTEROL: 166 MG/DL (ref 0–130)
MCH RBC QN AUTO: 32.6 PG (ref 25.2–33.5)
MCHC RBC AUTO-ENTMCNC: 33.6 G/DL (ref 28.4–34.8)
MCV RBC AUTO: 97.2 FL (ref 82.6–102.9)
NRBC AUTOMATED: 0 PER 100 WBC
PDW BLD-RTO: 13.2 % (ref 11.8–14.4)
PLATELET # BLD: 255 K/UL (ref 138–453)
PMV BLD AUTO: 10.3 FL (ref 8.1–13.5)
POTASSIUM SERPL-SCNC: 3.8 MMOL/L (ref 3.7–5.3)
RBC # BLD: 4.6 M/UL (ref 3.95–5.11)
SODIUM BLD-SCNC: 143 MMOL/L (ref 135–144)
TOTAL PROTEIN: 7.6 G/DL (ref 6.4–8.3)
TRIGLYCERIDE, FASTING: 303 MG/DL
VITAMIN B-12: 335 PG/ML (ref 232–1245)
VITAMIN D 25-HYDROXY: 20 NG/ML
WBC # BLD: 7.9 K/UL (ref 3.5–11.3)

## 2022-12-19 PROCEDURE — 82306 VITAMIN D 25 HYDROXY: CPT

## 2022-12-19 PROCEDURE — 85027 COMPLETE CBC AUTOMATED: CPT

## 2022-12-19 PROCEDURE — 83036 HEMOGLOBIN GLYCOSYLATED A1C: CPT

## 2022-12-19 PROCEDURE — 83540 ASSAY OF IRON: CPT

## 2022-12-19 PROCEDURE — 80061 LIPID PANEL: CPT

## 2022-12-19 PROCEDURE — 80053 COMPREHEN METABOLIC PANEL: CPT

## 2022-12-19 PROCEDURE — 82607 VITAMIN B-12: CPT

## 2022-12-19 PROCEDURE — 36415 COLL VENOUS BLD VENIPUNCTURE: CPT

## 2022-12-22 ENCOUNTER — HOSPITAL ENCOUNTER (OUTPATIENT)
Age: 37
Discharge: HOME | End: 2022-12-24
Payer: MEDICAID

## 2022-12-22 ENCOUNTER — HOSPITAL ENCOUNTER (OUTPATIENT)
Dept: GENERAL RADIOLOGY | Age: 37
Discharge: HOME | End: 2022-12-24
Payer: MEDICAID

## 2022-12-22 DIAGNOSIS — M25.561 RIGHT KNEE PAIN, UNSPECIFIED CHRONICITY: ICD-10-CM

## 2022-12-22 PROCEDURE — 73562 X-RAY EXAM OF KNEE 3: CPT

## 2023-03-20 ENCOUNTER — HOSPITAL ENCOUNTER (OUTPATIENT)
Age: 38
Discharge: HOME | End: 2023-03-20
Payer: MEDICAID

## 2023-03-20 LAB
25(OH)D3 SERPL-MCNC: 27.2 NG/ML
ALBUMIN SERPL-MCNC: 4.4 G/DL (ref 3.5–5.2)
ALBUMIN/GLOBULIN RATIO: 1.3 (ref 1–2.5)
ALP SERPL-CCNC: 91 U/L (ref 35–104)
ALT SERPL-CCNC: 110 U/L (ref 5–33)
ANION GAP SERPL CALCULATED.3IONS-SCNC: 12 MMOL/L (ref 9–17)
AST SERPL-CCNC: 79 U/L
BILIRUB SERPL-MCNC: 0.7 MG/DL (ref 0.3–1.2)
BUN SERPL-MCNC: 9 MG/DL (ref 6–20)
CALCIUM SERPL-MCNC: 9.6 MG/DL (ref 8.6–10.4)
CHLORIDE SERPL-SCNC: 102 MMOL/L (ref 98–107)
CHOLEST SERPL-MCNC: 233 MG/DL
CHOLESTEROL/HDL RATIO: 4.7
CO2 SERPL-SCNC: 26 MMOL/L (ref 20–31)
CREAT SERPL-MCNC: 0.71 MG/DL (ref 0.5–0.9)
EST. AVERAGE GLUCOSE BLD GHB EST-MCNC: 97 MG/DL
GFR SERPL CREATININE-BSD FRML MDRD: >60 ML/MIN/1.73M2
GLUCOSE SERPL-MCNC: 95 MG/DL (ref 70–99)
HBA1C MFR BLD: 5 % (ref 4–6)
HCT VFR BLD AUTO: 45 % (ref 36.3–47.1)
HDLC SERPL-MCNC: 50 MG/DL
HGB BLD-MCNC: 15.2 G/DL (ref 11.9–15.1)
IRON SERPL-MCNC: 120 UG/DL (ref 37–145)
LDLC SERPL CALC-MCNC: 127 MG/DL (ref 0–130)
MCH RBC QN AUTO: 32.5 PG (ref 25.2–33.5)
MCHC RBC AUTO-ENTMCNC: 33.8 G/DL (ref 28.4–34.8)
MCV RBC AUTO: 96.4 FL (ref 82.6–102.9)
NRBC AUTOMATED: 0 PER 100 WBC
PDW BLD-RTO: 13.1 % (ref 11.8–14.4)
PLATELET # BLD AUTO: 267 K/UL (ref 138–453)
PMV BLD AUTO: 10.3 FL (ref 8.1–13.5)
POTASSIUM SERPL-SCNC: 3.7 MMOL/L (ref 3.7–5.3)
PROT SERPL-MCNC: 7.8 G/DL (ref 6.4–8.3)
RBC # BLD: 4.67 M/UL (ref 3.95–5.11)
SODIUM SERPL-SCNC: 140 MMOL/L (ref 135–144)
TRIGL SERPL-MCNC: 278 MG/DL
VIT B12 SERPL-MCNC: 338 PG/ML (ref 232–1245)
WBC # BLD AUTO: 9.4 K/UL (ref 3.5–11.3)

## 2023-03-20 PROCEDURE — 80061 LIPID PANEL: CPT

## 2023-03-20 PROCEDURE — 82306 VITAMIN D 25 HYDROXY: CPT

## 2023-03-20 PROCEDURE — 36415 COLL VENOUS BLD VENIPUNCTURE: CPT

## 2023-03-20 PROCEDURE — 80053 COMPREHEN METABOLIC PANEL: CPT

## 2023-03-20 PROCEDURE — 82607 VITAMIN B-12: CPT

## 2023-03-20 PROCEDURE — 83540 ASSAY OF IRON: CPT

## 2023-03-20 PROCEDURE — 83036 HEMOGLOBIN GLYCOSYLATED A1C: CPT

## 2023-03-20 PROCEDURE — 85027 COMPLETE CBC AUTOMATED: CPT

## 2023-08-02 ENCOUNTER — HOSPITAL ENCOUNTER (OUTPATIENT)
Age: 38
Discharge: HOME | End: 2023-08-02
Payer: MEDICAID

## 2023-08-02 LAB
25(OH)D3 SERPL-MCNC: 26.5 NG/ML
ALBUMIN SERPL-MCNC: 3.9 G/DL (ref 3.5–5.2)
ALBUMIN/GLOB SERPL: 1.3 {RATIO} (ref 1–2.5)
ALP SERPL-CCNC: 82 U/L (ref 35–104)
ALT SERPL-CCNC: 64 U/L (ref 5–33)
ANION GAP SERPL CALCULATED.3IONS-SCNC: 13 MMOL/L (ref 9–17)
AST SERPL-CCNC: 54 U/L
BILIRUB SERPL-MCNC: 0.7 MG/DL (ref 0.3–1.2)
BUN SERPL-MCNC: 9 MG/DL (ref 6–20)
CALCIUM SERPL-MCNC: 9 MG/DL (ref 8.6–10.4)
CHLORIDE SERPL-SCNC: 103 MMOL/L (ref 98–107)
CHOLEST SERPL-MCNC: 233 MG/DL
CHOLESTEROL/HDL RATIO: 5.3
CO2 SERPL-SCNC: 24 MMOL/L (ref 20–31)
CREAT SERPL-MCNC: 0.6 MG/DL (ref 0.5–0.9)
ERYTHROCYTE [DISTWIDTH] IN BLOOD BY AUTOMATED COUNT: 12.9 % (ref 11.8–14.4)
EST. AVERAGE GLUCOSE BLD GHB EST-MCNC: 94 MG/DL
GFR SERPL CREATININE-BSD FRML MDRD: >60 ML/MIN/1.73M2
GLUCOSE SERPL-MCNC: 86 MG/DL (ref 70–99)
HBA1C MFR BLD: 4.9 % (ref 4–6)
HCT VFR BLD AUTO: 43 % (ref 36.3–47.1)
HDLC SERPL-MCNC: 44 MG/DL
HGB BLD-MCNC: 14.4 G/DL (ref 11.9–15.1)
IRON SERPL-MCNC: 152 UG/DL (ref 37–145)
LDLC SERPL CALC-MCNC: 125 MG/DL (ref 0–130)
MCH RBC QN AUTO: 31.9 PG (ref 25.2–33.5)
MCHC RBC AUTO-ENTMCNC: 33.5 G/DL (ref 28.4–34.8)
MCV RBC AUTO: 95.3 FL (ref 82.6–102.9)
NRBC BLD-RTO: 0 PER 100 WBC
PLATELET # BLD AUTO: NORMAL K/UL (ref 138–453)
PLATELET, FLUORESCENCE: NORMAL K/UL (ref 138–453)
POTASSIUM SERPL-SCNC: 3.9 MMOL/L (ref 3.7–5.3)
PROT SERPL-MCNC: 6.8 G/DL (ref 6.4–8.3)
RBC # BLD AUTO: 4.51 M/UL (ref 3.95–5.11)
SODIUM SERPL-SCNC: 140 MMOL/L (ref 135–144)
TRIGL SERPL-MCNC: 322 MG/DL
VIT B12 SERPL-MCNC: 237 PG/ML (ref 232–1245)
WBC OTHER # BLD: 7.8 K/UL (ref 3.5–11.3)

## 2023-08-02 PROCEDURE — 36415 COLL VENOUS BLD VENIPUNCTURE: CPT

## 2023-08-02 PROCEDURE — 80053 COMPREHEN METABOLIC PANEL: CPT

## 2023-08-02 PROCEDURE — 83036 HEMOGLOBIN GLYCOSYLATED A1C: CPT

## 2023-08-02 PROCEDURE — 82607 VITAMIN B-12: CPT

## 2023-08-02 PROCEDURE — 83540 ASSAY OF IRON: CPT

## 2023-08-02 PROCEDURE — 80061 LIPID PANEL: CPT

## 2023-08-02 PROCEDURE — 85055 RETICULATED PLATELET ASSAY: CPT

## 2023-08-02 PROCEDURE — 85027 COMPLETE CBC AUTOMATED: CPT

## 2023-08-02 PROCEDURE — 82306 VITAMIN D 25 HYDROXY: CPT

## 2023-10-27 ENCOUNTER — HOSPITAL ENCOUNTER (OUTPATIENT)
Age: 38
Discharge: HOME | End: 2023-10-27
Payer: MEDICAID

## 2023-10-27 LAB
25(OH)D3 SERPL-MCNC: 24.6 NG/ML
ALBUMIN SERPL-MCNC: 4.1 G/DL (ref 3.5–5.2)
ALBUMIN/GLOB SERPL: 1.3 {RATIO} (ref 1–2.5)
ALP SERPL-CCNC: 96 U/L (ref 35–104)
ALT SERPL-CCNC: 79 U/L (ref 5–33)
ANION GAP SERPL CALCULATED.3IONS-SCNC: 10 MMOL/L (ref 9–17)
AST SERPL-CCNC: 62 U/L
BILIRUB SERPL-MCNC: 0.7 MG/DL (ref 0.3–1.2)
BUN SERPL-MCNC: 9 MG/DL (ref 6–20)
CALCIUM SERPL-MCNC: 9.5 MG/DL (ref 8.6–10.4)
CHLORIDE SERPL-SCNC: 102 MMOL/L (ref 98–107)
CHOLEST SERPL-MCNC: 244 MG/DL
CHOLESTEROL/HDL RATIO: 5.7
CO2 SERPL-SCNC: 27 MMOL/L (ref 20–31)
CREAT SERPL-MCNC: 0.7 MG/DL (ref 0.5–0.9)
ERYTHROCYTE [DISTWIDTH] IN BLOOD BY AUTOMATED COUNT: 13.2 % (ref 11.8–14.4)
EST. AVERAGE GLUCOSE BLD GHB EST-MCNC: 94 MG/DL
GFR SERPL CREATININE-BSD FRML MDRD: >60 ML/MIN/1.73M2
GLUCOSE SERPL-MCNC: 92 MG/DL (ref 70–99)
HBA1C MFR BLD: 4.9 % (ref 4–6)
HCT VFR BLD AUTO: 43.1 % (ref 36.3–47.1)
HDLC SERPL-MCNC: 43 MG/DL
HGB BLD-MCNC: 14.8 G/DL (ref 11.9–15.1)
IRON SERPL-MCNC: 120 UG/DL (ref 37–145)
LDLC SERPL CALC-MCNC: 127 MG/DL (ref 0–130)
MCH RBC QN AUTO: 32.8 PG (ref 25.2–33.5)
MCHC RBC AUTO-ENTMCNC: 34.3 G/DL (ref 28.4–34.8)
MCV RBC AUTO: 95.6 FL (ref 82.6–102.9)
NRBC BLD-RTO: 0 PER 100 WBC
PLATELET # BLD AUTO: 273 K/UL (ref 138–453)
PMV BLD AUTO: 10.2 FL (ref 8.1–13.5)
POTASSIUM SERPL-SCNC: 3.9 MMOL/L (ref 3.7–5.3)
PROT SERPL-MCNC: 7.3 G/DL (ref 6.4–8.3)
RBC # BLD AUTO: 4.51 M/UL (ref 3.95–5.11)
SODIUM SERPL-SCNC: 139 MMOL/L (ref 135–144)
TRIGL SERPL-MCNC: 369 MG/DL
VIT B12 SERPL-MCNC: 308 PG/ML (ref 232–1245)
WBC OTHER # BLD: 8.6 K/UL (ref 3.5–11.3)

## 2023-10-27 PROCEDURE — 82306 VITAMIN D 25 HYDROXY: CPT

## 2023-10-27 PROCEDURE — 83540 ASSAY OF IRON: CPT

## 2023-10-27 PROCEDURE — 83036 HEMOGLOBIN GLYCOSYLATED A1C: CPT

## 2023-10-27 PROCEDURE — 80061 LIPID PANEL: CPT

## 2023-10-27 PROCEDURE — 85027 COMPLETE CBC AUTOMATED: CPT

## 2023-10-27 PROCEDURE — 36415 COLL VENOUS BLD VENIPUNCTURE: CPT

## 2023-10-27 PROCEDURE — 80053 COMPREHEN METABOLIC PANEL: CPT

## 2023-10-27 PROCEDURE — 82607 VITAMIN B-12: CPT

## 2023-11-10 ENCOUNTER — HOSPITAL ENCOUNTER (OUTPATIENT)
Dept: GENERAL RADIOLOGY | Age: 38
End: 2023-11-10
Payer: MEDICAID

## 2023-11-10 ENCOUNTER — HOSPITAL ENCOUNTER (OUTPATIENT)
Age: 38
End: 2023-11-10
Payer: MEDICAID

## 2023-11-10 DIAGNOSIS — M25.552 LEFT HIP PAIN: ICD-10-CM

## 2023-11-10 PROCEDURE — 73502 X-RAY EXAM HIP UNI 2-3 VIEWS: CPT

## 2023-11-27 SDOH — HEALTH STABILITY: PHYSICAL HEALTH: ON AVERAGE, HOW MANY DAYS PER WEEK DO YOU ENGAGE IN MODERATE TO STRENUOUS EXERCISE (LIKE A BRISK WALK)?: 4 DAYS

## 2023-11-27 SDOH — HEALTH STABILITY: PHYSICAL HEALTH: ON AVERAGE, HOW MANY MINUTES DO YOU ENGAGE IN EXERCISE AT THIS LEVEL?: 120 MIN

## 2023-11-27 ASSESSMENT — SOCIAL DETERMINANTS OF HEALTH (SDOH)
WITHIN THE LAST YEAR, HAVE YOU BEEN HUMILIATED OR EMOTIONALLY ABUSED IN OTHER WAYS BY YOUR PARTNER OR EX-PARTNER?: NO
WITHIN THE LAST YEAR, HAVE YOU BEEN KICKED, HIT, SLAPPED, OR OTHERWISE PHYSICALLY HURT BY YOUR PARTNER OR EX-PARTNER?: NO
WITHIN THE LAST YEAR, HAVE YOU BEEN AFRAID OF YOUR PARTNER OR EX-PARTNER?: NO
WITHIN THE LAST YEAR, HAVE TO BEEN RAPED OR FORCED TO HAVE ANY KIND OF SEXUAL ACTIVITY BY YOUR PARTNER OR EX-PARTNER?: NO

## 2023-11-29 ENCOUNTER — OFFICE VISIT (OUTPATIENT)
Dept: ORTHOPEDIC SURGERY | Age: 38
End: 2023-11-29

## 2023-11-29 VITALS — HEIGHT: 64 IN | BODY MASS INDEX: 36.7 KG/M2 | WEIGHT: 215 LBS

## 2023-11-29 DIAGNOSIS — M25.552 LEFT HIP PAIN: Primary | ICD-10-CM

## 2023-11-29 NOTE — PROGRESS NOTES
[address] [facility]  [address] [facility] [phone]  [address]pt: [address]      CHIEF COMPLAINT:    Chief Complaint   Patient presents with    New Patient     Left hip pain x months       HISTORY OF PRESENT ILLNESS:    The patient is a 41 y.o. female who is being seen as a new patient for  worsening left hip pain over the past month. Patient reported that the pain initially began 9 months ago roughly without an inciting injury. Patient denies any numbness, tingling, weakness. Patient reports pain is in his C-sign pattern at left hip previous though does not involve the groin. Patient reports pain radiates in a trigger point fashion though no radicular symptoms beyond the mid thigh. Patient is history of low back pain and has been taking anti-inflammatories which provide moderate relief. Patient does report a dull achy pain at night that is difficult to sleep. Patient notes pain aggravated by increased activity level and at the end of the day. She previously has not done any physical therapy or had any prior injections of the left hip. Patient has no other orthopedic complaints this time.       Past Medical History:    Past Medical History:   Diagnosis Date    Abnormal Pap smear of cervix     ASCUS with positive high risk HPV 2013    Crohn's disease ([address])     Elevated LFTs     Fracture     Nose fracture no surgery    Headache     Hearing loss     Hypoglycemia     Ovarian cyst     Left    Personal history of other medical treatment     OVA1 elevated markers    Wellness examination 05/10/2021    PCP: Myra Porter PA-C, last visit 4/29/2021    Wellness examination 05/10/2021    GI: Zabrina Millard, last visit 5/3/2021    Wellness examination 05/10/2021    Dr. Deborah Ferreira, [facility], 4/2021    Wellness examination 05/10/2021    CNM: Kirsty Velasquez, [place] [facility], last visit 2021       Past

## 2023-12-04 ENCOUNTER — HOSPITAL ENCOUNTER (OUTPATIENT)
Dept: PHYSICAL THERAPY | Age: 38
Setting detail: THERAPIES SERIES
Discharge: HOME | End: 2023-12-04
Payer: COMMERCIAL

## 2023-12-04 PROCEDURE — 97161 PT EVAL LOW COMPLEX 20 MIN: CPT

## 2023-12-04 PROCEDURE — 97110 THERAPEUTIC EXERCISES: CPT

## 2023-12-04 NOTE — CONSULTS
[x] [facility]inny Wang  Outpatient Physical Therapy  [address] [facility].  Phone: [phone]  Fax: [phone] [] [address] [facility]   Phone: [phone]  Fax: [phone]  [] [facility] for [employer] at [address]  Phone: [phone]   Fax: [phone]     Physical Therapy Evaluation    Date:  2023  Patient: Amari Reid  : 1985  MRN: [id number]  Physician: Adair Raza DO     Insurance: [email or web address] dual, [facility]   Medical Diagnosis: I03.626 (ICD-10-CM) - Left hip pain     Rehab Codes: M25.552, M25.652, M62.81  Onset Date: 2023                                 Next 's appt: 24 ortho    Subjective:   CC: Ritesh  presents to physical therapy evaluation with signs and symptoms consistent with femoral acetabular impingement and possible IT band syndrome. Pt states her pain starts in her left hip joint and travels up into her low back and down to her L knee. Pt states stairs, sudden stops while walking, getting up from the ground, and squatting/kneeling are painful. Pt also reports home workouts on stationary bike also aggravate hip pain. HPI: On and off pain in L hip that started 9 months ago. Pain started becoming more constant in October. PMHx/Comorbidities:  [x] refer to full medical chart in [facility] [] Unremarkable    [] Pacemaker [] Cancer [] Arthritis   [] MI/Heart Problems [] Diabetes [] HTN   [] Obesity [] Dialysis  [x] Other: Crohn's disease   [] Asthma/COPD [] Dementia [] Other:   [] Stroke [] Sleep apnea [] Other:   [] Vascular disease [] Rheumatic disease [] Other:     Medications: [x] Refer to full medical record [] None [] Other:  Allergies:      [x] Refer to full medical record  [] None [] Other:    Tests:   [x] X-Ray: L hip 11/15  FINDINGS:  There is no evidence of acute fracture. There is normal alignment.

## 2023-12-06 ENCOUNTER — HOSPITAL ENCOUNTER (OUTPATIENT)
Dept: PHYSICAL THERAPY | Age: 38
Setting detail: THERAPIES SERIES
Discharge: HOME | End: 2023-12-06
Payer: COMMERCIAL

## 2023-12-06 PROCEDURE — 97110 THERAPEUTIC EXERCISES: CPT

## 2023-12-06 PROCEDURE — 97140 MANUAL THERAPY 1/> REGIONS: CPT

## 2023-12-06 NOTE — FLOWSHEET NOTE
[x] [facility]) [facility] &  Therapy  [address] [facility].  [phone]  F: [phone]     Physical Therapy Daily Treatment Note    Date:  2023  Patient Name:  Ty Kaplan      :  1985    MRN: [id number]  Physician: Darrius Cleveland DO                                      Insurance: [employer] dual, [address] REQUIRED AFTER 12TH VISIT   Medical Diagnosis: T94.350 (ICD-10-CM) - Left hip pain                 Rehab Codes: M25.552, M25.652, M62.81  Onset Date: 2023                                  Next 's appt: 24 ortho     Visit# / total visits:     Cancels/No Shows: 0/0    Subjective:    Pain:  [x] Yes  [] No Location: L anterolateral hip   Pain Rating: (0-10 scale) 3.5/10  Pain altered Tx:  [x] No  [] Yes  Action:  Comments: patient reports that she just woke up recently and has not done too much yet so her pain is not too bad currently. Some pain and numbness around hip presently. Objective:  Modalities:   Precautions:  Exercises:  Exercise Reps/ Time Weight/ Level Comments   SciFit 6 min Level 4          Supine      Hip Flexor Stretch off EOM 3x30\"     Manual 5 min  Lateral, anterolateral, inferior joint MOBS w/mobility belt   Bridge 2x10     Resisted hip Adduction (bent knee fallout 2x10 Blue    SLR 2x10 AROM    Resisted Hip Flexion 2x10 Lime Loop Loop around feet         Sidelying      Clamshells 2x10 Blue    Hip Abduction 2x10 Blue          Prone      Hip Extension      Quad Stretch w/strap 3x30\"           Other:      Treatment Charges: Mins Units   []  Modalities     [x]  Ther Exercise 36 2   [x]  Manual Therapy 5 1   []  Ther Activities     []  Neuro Re-ed     []  Vasocompression     [] Gait     []  Other     Total Billable time 41 3       Assessment: [x] Progressing toward goals. Initiated Tx this date to address L hip pain consistent with DEONDRE.  Manual therapy to improve joint and soft tissue mobility - light joint MOBS with this as

## 2023-12-13 ENCOUNTER — HOSPITAL ENCOUNTER (OUTPATIENT)
Dept: PHYSICAL THERAPY | Age: 38
Setting detail: THERAPIES SERIES
Discharge: HOME | End: 2023-12-13
Payer: COMMERCIAL

## 2023-12-13 PROCEDURE — 97110 THERAPEUTIC EXERCISES: CPT

## 2023-12-13 NOTE — FLOWSHEET NOTE
Step  - 2-3 x daily - 7 x weekly - 1 sets - 3 reps - 30 sec hold    Plan: [x] Continue current frequency toward long and short term goals.      [x] Frequency:  2 x/week for 12 visits   [x] Specific Instructions for subsequent treatments: hip abd/ext strengthening, core strengthening, hip flexor stretching         Time In: 4895            Time Out: 6700      Electronically signed by:  Veronique Overton PTA

## 2023-12-15 ENCOUNTER — HOSPITAL ENCOUNTER (OUTPATIENT)
Dept: PHYSICAL THERAPY | Age: 38
Setting detail: THERAPIES SERIES
Discharge: HOME | End: 2023-12-15
Payer: COMMERCIAL

## 2023-12-15 PROCEDURE — 97110 THERAPEUTIC EXERCISES: CPT

## 2023-12-15 NOTE — FLOWSHEET NOTE
[x] [address]  [address] [facility].  [phone]  F: [phone]     Physical Therapy Daily Treatment Note    Date:  12/15/2023  Patient Name:  Francine Wilkinson      :  1985    MRN: [id number]  Physician: Meli Crouch DO                                      Insurance: [employer] dual, [facility]   Medical Diagnosis: J33.233 (ICD-10-CM) - Left hip pain                 Rehab Codes: M25.552, M25.652, M62.81  Onset Date: 2023                                  Next 's appt: 24 ortho     Visit# / total visits:     Cancels/No Shows: 0/0    Subjective:    Pain:  [x] Yes  [] No Location: L anterolateral hip   Pain Rating: (0-10 scale) 4.5-5/10  Pain altered Tx:  [x] No  [] Yes  Action:  Comments: Pt arrives stating she is in the same amount of pain this morning and is still unable to get comfortable in order to get some good sleep.      Objective:  Modalities:   Precautions:  Exercises:  Exercise Reps/ Time Weight/ Level Comments   SciFit 6 min Level 4          Standing      Hip Flexor Stretch on Step 3x30\" 12 inch Added 23   Hamstring Stretch on Step 3x30\" 12 inch Added 23         Supine      Hip Flexor Stretch off EOM 3x30\"     Held 23   Bridge 2x10     Resisted hip Adduction (bent knee fallout 2x10 Blue    SLR 2x10 AROM    Resisted Hip Flexion 2x10 Lime Loop Loop around feet         Sidelying      Clamshells 2x10 Blue    Hip Abduction 2x10 Blue    Reverse Clamshells 2x10 Blue Added resistance 12/15/23   IT Band/QL Stretch 3x30\"  R side lying - w/overhead reach  Added 23  Modified to standing 12/15         Prone      Hip Extension 2x10     Quad Stretch w/strap 3x30\"  Added bolster 12/15   Prayer stretch 2x30\" [address] [facility] and to R (stretching L side)   Other:      Treatment Charges: Mins Units   []  Modalities     [x]  Ther Exercise 42 3   []  Manual Therapy     []  Ther Activities     []  Neuro

## 2023-12-22 ENCOUNTER — APPOINTMENT (OUTPATIENT)
Dept: PHYSICAL THERAPY | Age: 38
End: 2023-12-22
Payer: COMMERCIAL

## 2023-12-27 ENCOUNTER — HOSPITAL ENCOUNTER (OUTPATIENT)
Dept: PHYSICAL THERAPY | Age: 38
Setting detail: THERAPIES SERIES
Discharge: HOME | End: 2023-12-27
Payer: COMMERCIAL

## 2023-12-27 PROCEDURE — 97110 THERAPEUTIC EXERCISES: CPT

## 2023-12-27 NOTE — FLOWSHEET NOTE
[x] [facility] CENTER &  Therapy  [address] [facility].  [phone]  F: [phone]     Physical Therapy Daily Treatment Note    Date:  2023  Patient Name:  Victoria Whalen      :  1985    MRN: [id number]  Physician: Estefanía Yusuf DO                                      Insurance: Maudine Fidel dual, [address] REQUIRED AFTER 12TH VISIT   Medical Diagnosis: W37.499 (ICD-10-CM) - Left hip pain                 Rehab Codes: M25.552, M25.652, M62.81  Onset Date: 2023                                  Next 's appt: 24 ortho   Visit# / total visits:     Cancels/No Shows: 0/0    Subjective:    Pain:  [x] Yes  [] No Location: L anterolateral hip   Pain Rating: (0-10 scale) 4/10  Pain altered Tx:  [x] No  [] Yes  Action:  Comments:  Reports  left hip is feeling somewhat better, addressing HEP/stretching. Objective:  Modalities: as needed  Precautions:  Exercises:  Exercise Reps/ Time Weight/ Level Comments   SciFit 6 min Level 4          Standing      Calf stretch  3x30\"  wedge   Hip Flexor Stretch on Step 3x30\" 12 inch     Hamstring Stretch on Step 3x30\" 12 inch    Hip hikes 1x10 6 \"  R CKC, added    IT/QL stretch  3x30\"  Vc for correct tech. Supine      Hip ER stretch.   3x20\"  Fabers stretch, added    Hip Flexor Stretch off EOM 3x30\"  Added strap at repetitions #2-3   Held 23   Groin stretch  3x20\"  Added    Bridge 2x10     Iso abdom  1x10 5\" Added    Iso abdom / march  1x15  Added    Resisted hip Abduction (bent knee fallout 2x10 Blue Omitted in error    SLR 2x10 AROM    Resisted Hip Flexion 2x10 Lime Loop Loop around feet         Sidelying      Clamshells 2x10 Blue    Hip Abduction 2x10 Blue With 1.5 lbs    Reverse Clamshells 2x10 Blue                  Prone      Hip Extension 2x10     Quad Stretch w/strap 3x30\"  bolster    Prayer stretch 3x30\" [address] [place] [address] and to R (stretching L side)

## 2024-01-02 ENCOUNTER — HOSPITAL ENCOUNTER (OUTPATIENT)
Dept: PHYSICAL THERAPY | Age: 39
Setting detail: THERAPIES SERIES
Discharge: HOME | End: 2024-01-02
Payer: COMMERCIAL

## 2024-01-02 PROCEDURE — 97110 THERAPEUTIC EXERCISES: CPT

## 2024-01-02 PROCEDURE — 97016 VASOPNEUMATIC DEVICE THERAPY: CPT

## 2024-01-02 NOTE — FLOWSHEET NOTE
[x] [facility]  Outpatient Rehabilitation &  Therapy  [address]  [phone]  F: [phone]     Physical Therapy Daily Treatment Note    Date:  2024  Patient Name:  More Goldman      :  1985    MRN: [id number]  Physician: Jj Dimas DO                                      Insurance: [employer] dual, AUTH REQUIRED AFTER 12TH VISIT   Medical Diagnosis: M25.552 (ICD-10-CM) - Left hip pain                 Rehab Codes: M25.552, M25.652, M62.81  Onset Date: 2023                                  Next 's appt: 24 ortho    Visit# / total visits:     Cancels/No Shows: 0/0    Subjective:    Pain:  [x] Yes  [] No Location: L anterolateral hip   Pain Rating: (0-10 scale) 5.5/10  Pain altered Tx:  [x] No  [] Yes  Action:  Comments: Patient notes increased pain in her hip and pain radiating down her leg today. Unsure of the reason for the increased pain coming on this morning. Attempted to use HP and TENS, which provided no relief.     Objective:  Modalities: Vasocompression x15 minutes post Tx, Min Compression 40 degrees - supine     Exercises:  Exercise Reps/ Time Weight/ Level Comments 24   SciFit 6 min Level 4  X          Standing       Calf stretch  3x30\"  wedge X   Hip Flexor Stretch on Step 3x30\" 12 inch   X   Hamstring Stretch on Step 3x30\" 12 inch  X   Hip hikes 1x10 6 \"  R CKC, added     IT/QL stretch  3x30\"  Vc for correct tech.  X          Supine       Hip ER stretch.  3x20\"  Fabers stretch, added  X   Hip Flexor Stretch off EOM w/strap 3x30\"   X   Groin stretch  3x20\"  Added     Bridge 2x10   X   Iso abdom  1x10 5\" Added     Iso abdom w/ march  1x15  Added     Resisted hip Adduction (bent knee fallout w/resisted adduction) 2x10 Blue  X   SLR 2x10 AROM  X   Resisted Hip Flexion 2x10 Lime Loop Loop around feet X          Sidelying       Clamshells 2x10 Blue  X   Hip Abduction 2x10 Blue  X   Reverse Clamshells 2x10

## 2024-01-05 ENCOUNTER — HOSPITAL ENCOUNTER (OUTPATIENT)
Dept: PHYSICAL THERAPY | Age: 39
Setting detail: THERAPIES SERIES
Discharge: HOME | End: 2024-01-05
Payer: COMMERCIAL

## 2024-01-05 NOTE — FLOWSHEET NOTE
[x] [facility]  Outpatient Rehabilitation &  Therapy  [address]  [phone]  F: [phone] [] [place]  Outpatient Rehabilitation &  Therapy  [address]  P: [phone]  F: [phone] [] [facility]  Outpatient Rehabilitation &  Therapy  [address]  P: [phone]  F: [phone] [] [facility]  Outpatient Rehabilitation &  Therapy  [address]  P: [phone]  F: [phone] [] [facility]  Outpatient Rehabilitation &  Therapy  [address]   P: [phone]  F: [phone]  [] [facility]  Outpatient Rehabilitation &  Therapy  [address]   P: [phone]  F: [phone] [] [facility]  Outpatient Rehabilitation &  Therapy  [address]  P: [phone]  F: [phone] [] [facility]  Outpatient Rehabilitation &  Therapy  [address]  P: [phone]  F: [phone] [] [facility]  Outpatient Rehabilitation &  Therapy  [address]  P: [phone]  F: [phone]      Therapy Cancel/No Show note    Date: 2024  Patient: More Goldman  : 1985  MRN: [id number]    Cancels/No Shows to date:     For today's appointment patient:    [x]  Cancelled    [] Rescheduled appointment    [] No-show     Reason given by patient:    []  Patient ill    []  Conflicting appointment    [] No transportation      [] Conflict with work    [x] No reason given    [] Weather related    [] COVID-19    [] Other:      Comments:        [x] Next appointment was confirmed    Electronically signed by: SHERLEY GRULLON PTA

## 2024-01-08 ENCOUNTER — HOSPITAL ENCOUNTER (OUTPATIENT)
Dept: PHYSICAL THERAPY | Age: 39
Setting detail: THERAPIES SERIES
Discharge: HOME | End: 2024-01-08
Payer: COMMERCIAL

## 2024-01-08 PROCEDURE — 97110 THERAPEUTIC EXERCISES: CPT

## 2024-01-08 PROCEDURE — 97016 VASOPNEUMATIC DEVICE THERAPY: CPT

## 2024-01-08 NOTE — FLOWSHEET NOTE
[x] [facility]  Outpatient Rehabilitation &  Therapy  [address]  [phone]  F: [phone]     Physical Therapy Daily Treatment Note    Date:  2024  Patient Name:  More Goldman      :  1985    MRN: [id number]  Physician: Jj Dimas DO                                      Insurance: [place] [employer] dual, AUTH REQUIRED AFTER 12TH VISIT   Medical Diagnosis: M25.552 (ICD-10-CM) - Left hip pain                 Rehab Codes: M25.552, M25.652, M62.81  Onset Date: 2023                                  Next 's appt: 24 ortho    Visit# / total visits:     Cancels/No Shows: 0/0    Subjective:    Pain:  [x] Yes  [] No Location: L anterolateral hip   Pain Rating: (0-10 scale) 3-4/10  Pain altered Tx:  [x] No  [] Yes  Action:  Comments: Hip is feeling pretty good today. Notes ice really helped alleviate mm pain in her anterior thigh last Tx and has been utilizing ice at home as well.     Objective:  Modalities: Vasocompression x15 minutes post Tx, Min Compression 40 degrees - supine     Exercises:  Exercise Reps/ Time Weight/ Level Comments 24   SciFit 6 min Level 4  X          Standing       Calf stretch  3x30\"  wedge X   Hip Flexor Stretch on Step 3x30\" 12 inch   X   Hamstring Stretch on Step 3x30\" 12 inch  X   4 Way hip 2x10 ea Lime Added 24 X          Hip hikes 1x10 6 \"  R CKC, added     IT/QL stretch  3x30\"  Vc for correct tech.            Supine       Hip ER stretch.  3x20\"  Fabers stretch, added     Hip Flexor Stretch off EOM w/strap 3x30\"   X   Groin stretch  3x20\"  Added  X   Bridge 2x10   X   Resisted hip Adduction (bent knee fallout w/resisted adduction) 2x10 Blue  X   SLR 2x10 AROM     Resisted Hip Flexion 2x10 Lime Loop Loop around feet X          Sidelying       Clamshells 2x10 Blue  X   Hip Abduction 2x10 Blue  X   Reverse Clamshells 2x10 Blue   X                  Prone       Hip Extension w/knee bent 2x10  Added knee band

## 2024-01-11 ENCOUNTER — HOSPITAL ENCOUNTER (OUTPATIENT)
Dept: PHYSICAL THERAPY | Age: 39
Setting detail: THERAPIES SERIES
Discharge: HOME | End: 2024-01-11
Payer: COMMERCIAL

## 2024-01-11 PROCEDURE — 97110 THERAPEUTIC EXERCISES: CPT

## 2024-01-11 PROCEDURE — 97016 VASOPNEUMATIC DEVICE THERAPY: CPT

## 2024-01-11 NOTE — FLOWSHEET NOTE
[x] [facility]  Outpatient Rehabilitation &  Therapy  [address]  [phone]  F: [phone]     Physical Therapy Daily Treatment Note    Date:  2024  Patient Name:  More Goldman      :  1985    MRN: [id number]  Physician: Jj Dimas DO                                      Insurance: [place] [employer] dual, AUTH REQUIRED AFTER 12TH VISIT   Medical Diagnosis: M25.552 (ICD-10-CM) - Left hip pain                 Rehab Codes: M25.552, M25.652, M62.81  Onset Date: 2023                                  Next 's appt: 24 ortho    Visit# / total visits:     Cancels/No Shows: 0/0    Subjective:    Pain:  [x] Yes  [] No Location: L anterolateral hip   Pain Rating: (0-10 scale) 4.5/10  Pain altered Tx:  [x] No  [] Yes  Action:  Comments: reports that her quad mm's are feeling pretty good, but notes moderate pain in anterior hip.     Objective:  Modalities: Vasocompression x15 minutes post Tx, Min Compression 38 degrees - supine     Exercises:  Exercise Reps/ Time Weight/ Level Comments 24   SciFit 6 min Level 4            Upright Bicycle 6 min M 2 Added 24  X          Standing       Calf stretch  3x30\"  wedge X   Hip Flexor Stretch on Step 3x30\" 12 inch   X   Hamstring Stretch on Step 3x30\" 12 inch  X   4 Way hip 2x10 ea Lime Added 24 X          Step Ups 20x 8 inch Added 24 X   Step Down - Fwd 20x  4 inch Added 24 X   Hip Hikes 2x10 4 inch Bilaterally  X   IT/QL stretch  3x30\"  Vc for correct tech.            Supine       Hip ER stretch.  3x20\"  Fabers stretch, added     Hip Flexor Stretch off EOM w/strap 3x30\"   X   Groin stretch  3x20\"  Added  X   Bridge w/band 3x10 Blue  X   Resisted hip Adduction (bent knee fallout w/resisted adduction) 2x10 Blue  X   SLR 2x10 AROM     Resisted Hip Flexion 2x10 Blue Loop Loop around feet  Increased band 24 X          Sidelying       Clamshells 2x10 Blue  X   Hip Abduction 2x10 Blue  X

## 2024-01-15 ENCOUNTER — HOSPITAL ENCOUNTER (OUTPATIENT)
Dept: PHYSICAL THERAPY | Age: 39
Setting detail: THERAPIES SERIES
Discharge: HOME | End: 2024-01-15
Payer: COMMERCIAL

## 2024-01-15 NOTE — FLOWSHEET NOTE
[x] [facility]  Outpatient Rehabilitation &  Therapy  [address]  [phone]  F: [phone] [] [facility]  Outpatient Rehabilitation &  Therapy  [address]   [address]  P: [phone]  F: [phone] [] [facility]  Outpatient Rehabilitation &  Therapy  [address]  P: [phone]  F: [phone] [] [facility]  Outpatient Rehabilitation &  Therapy  [address]  P: [phone]  F: [phone] [] [facility]  Outpatient Rehabilitation &  Therapy  [address]   P: [phone]  F: [phone]  [] [facility]  Outpatient Rehabilitation &  Therapy  [address]   P: [phone]  F: [phone] [] [facility]  Outpatient Rehabilitation &  Therapy  [address]  P: [phone]  F: [phone] [] [facility]  Outpatient Rehabilitation &  Therapy  [address]  P: [phone]  F: [phone] [] [facility]  Outpatient Rehabilitation &  Therapy  [address]  P: [phone]  F: [phone]      Therapy Cancel/No Show note    Date: 1/15/2024  Patient: More Goldman  : 1985  MRN: [id number]    Cancels/No Shows to date: 0    For today's appointment patient:    [x]  Cancelled    [] Rescheduled appointment    [] No-show     Reason given by patient:    []  Patient ill    []  Conflicting appointment    [] No transportation      [] Conflict with work    [x] No reason given    [] Weather related    [] COVID-19    [] Other:      Comments:  left message over the weekend canceling for 1/15; no reason given.       [x] Next appointment was confirmed    Electronically signed by: Jean-Pierre Rodriguez, PT

## 2024-01-18 ENCOUNTER — HOSPITAL ENCOUNTER (OUTPATIENT)
Dept: PHYSICAL THERAPY | Age: 39
Setting detail: THERAPIES SERIES
Discharge: HOME | End: 2024-01-18
Payer: COMMERCIAL

## 2024-01-18 NOTE — FLOWSHEET NOTE
[x] [facility]  Outpatient Rehabilitation &  Therapy  [address] [place] [address]  [phone]  F: [phone]     Therapy Cancel/No Show note    Date: 2024  Patient: More Goldman  : 1985  MRN: [id number]    Cancels/No Shows to date:     For today's appointment patient:    []  Cancelled    [] Rescheduled appointment    [x] No-show     Reason given by patient:    []  Patient ill    []  Conflicting appointment    [] No transportation      [] Conflict with work    [x] No reason given    [] Weather related    [] COVID-19    [] Other:      Comments:        [] Next appointment was confirmed    Electronically signed by: Bryan Hitchcock PTA

## 2024-01-23 ENCOUNTER — HOSPITAL ENCOUNTER (OUTPATIENT)
Dept: PHYSICAL THERAPY | Age: 39
Setting detail: THERAPIES SERIES
Discharge: HOME | End: 2024-01-23
Payer: COMMERCIAL

## 2024-01-23 NOTE — FLOWSHEET NOTE
[x] [facility]  Outpatient Rehabilitation &  Therapy  [address] [place] [address]  [phone]  F: [phone]     Therapy Cancel/No Show note    Date: 2024  Patient: More Goldman  : 1985  MRN: [id number]    Cancels/No Shows to date: 3/1    For today's appointment patient:    [x]  Cancelled    [] Rescheduled appointment    [] No-show     Reason given by patient:    []  Patient ill    []  Conflicting appointment    [] No transportation      [] Conflict with work    [] No reason given    [x] Weather related    [] COVID-19    [] Other:      Comments:        [x] Next appointment was confirmed    Electronically signed by: Bryan Hitchcock PTA

## 2024-01-25 ENCOUNTER — HOSPITAL ENCOUNTER (OUTPATIENT)
Dept: PHYSICAL THERAPY | Age: 39
Setting detail: THERAPIES SERIES
Discharge: HOME | End: 2024-01-25
Payer: COMMERCIAL

## 2024-01-25 PROCEDURE — 97110 THERAPEUTIC EXERCISES: CPT

## 2024-01-25 NOTE — FLOWSHEET NOTE
[x] [facility]  Outpatient Rehabilitation &  Therapy  [address]  [phone]  F: [phone]     Physical Therapy Daily Treatment Note    Date:  2024  Patient Name:  More Goldman      :  1985    MRN: [id number]  Physician: Jj Dimas DO                                      Insurance: [place] [employer] dual, AUTH REQUIRED AFTER 12TH VISIT   Medical Diagnosis: M25.552 (ICD-10-CM) - Left hip pain                 Rehab Codes: M25.552, M25.652, M62.81  Onset Date: 2023                                  Next 's appt: 24 ortho    Visit# / total visits:     Cancels/No Shows: 3/1    Subjective:    Pain:  [x] Yes  [] No Location: L anterolateral hip   Pain Rating: (0-10 scale) 3.5-4/10  Pain altered Tx:  [x] No  [] Yes  Action:  Comments: mm soreness is getting better with HEP. Does report slight increase in hip pain, but does improve with rest.     Objective:  Modalities: Vasocompression x15 minutes post Tx, Min Compression 38 degrees - supine     Exercises:  Exercise Reps/ Time Weight/ Level Comments 24   SciFit 6 min Level 4            Upright Bicycle 6 min M 2 Added 24  X          Standing       Calf stretch  3x30\"  wedge X   Hip Flexor Stretch on Step 3x30\" 12 inch   X   Hamstring Stretch on Step 3x30\" 12 inch  X   4 Way hip 2x10 ea Lime Added 24 X          Step Ups 20x 8 inch Added lateral step up 24    Step Down - Fwd 20x  4 inch Added 24    Hip Hikes 2x10 4 inch Bilaterally     Lunge 10x ea  Bilaterally  Added 24 X          Leg Press 20x 20 lb Added 24 X          Seated       LAQ 20x 5 lb Added 24 X   Elevated March  20x 5 lb Added 24 X   Goblet Squat to mat 20x 10 lb Added 24 X   SL Sit to Stand 10x  Added 24 X          Supine       Hip ER stretch 3x20\"  Fabers stretch, added     Hip Flexor Stretch off EOM w/strap 3x30\"      Groin stretch  3x20\"  Added     Bridge w/band 3x10 Blue

## 2024-01-31 ENCOUNTER — PATIENT MESSAGE (OUTPATIENT)
Dept: ORTHOPEDIC SURGERY | Age: 39
End: 2024-01-31

## 2024-01-31 ENCOUNTER — OFFICE VISIT (OUTPATIENT)
Dept: ORTHOPEDIC SURGERY | Age: 39
End: 2024-01-31

## 2024-01-31 VITALS — BODY MASS INDEX: 36.7 KG/M2 | HEIGHT: 64 IN | WEIGHT: 215 LBS

## 2024-01-31 DIAGNOSIS — R52 PAIN: ICD-10-CM

## 2024-01-31 DIAGNOSIS — M76.822 INSUFFICIENCY OF LEFT POSTERIOR TIBIAL TENDON: Primary | ICD-10-CM

## 2024-02-01 NOTE — TELEPHONE ENCOUNTER
From: More Goldman  To: Charles Zaragoza MD  Sent: 1/31/2024 4:39 PM EST  Subject: Prescription     I was just informing you that my insurance doesn’t cover the prescription you sent to the pharmacy

## 2024-02-01 NOTE — PROGRESS NOTES
radiographic views of the left ankle AP, mortise, lateral demonstrating a well aligned left ankle without evidence of acute fracture or dislocation.  There is a notable calcaneal spur present on the inferior aspect of the calcaneus.  Joint space well-maintained at the tibiotalar joint.    Impression:  Normal left ankle without acute fracture or dislocation.    ASSESSMENT:     1. Insufficiency of left posterior tibial tendon    2. Pain         PLAN:       Ms. Goldman is a 38-year-old male seen evaluated in clinic today.  We had extensive discussion regarding her current clinical state.  We reviewed her radiographs together.  Based off physical exam I believe she has stage I posterior tibial tendon insufficiency.  She can perform heel raise however has mild valgus deformity of her hindfoot with too many toes sign.  She is likely has tenosynovitis directly over her posterior tibial tendon course.  At this point in time we would like the patient to be immobilized in a cam walking boot.  She cannot take oral anti-inflammatories so we will prescribe topical Voltaren gel which she can apply 4 times daily.  We will have her maintain the boot while ambulatory for 4 weeks.  Will have the patient follow-up in our office in 1 month at which time we will discuss physical therapy for ankle strengthening and range of motion.  Patient voices understanding and agrees with this plan.      Return in about 4 weeks (around 2/28/2024) for WITHOUT new radiographs.    Orders Placed This Encounter   Medications    diclofenac sodium (VOLTAREN) 1 % GEL     Sig: Apply 4 g topically 4 times daily     Dispense:  350 g     Refill:  0       Orders Placed This Encounter   Procedures    XR ANKLE LEFT (MIN 3 VIEWS)     Standing Status:   Future     Number of Occurrences:   1     Standing Expiration Date:   1/31/2025          Reviewed Subjective section with patient who did agree and confirmed everything documented. Discussed plan and patient

## 2024-02-20 ENCOUNTER — HOSPITAL ENCOUNTER (OUTPATIENT)
Age: 39
Discharge: HOME | End: 2024-02-20
Payer: COMMERCIAL

## 2024-02-20 LAB
25(OH)D3 SERPL-MCNC: 20.3 NG/ML (ref 30–100)
ALBUMIN SERPL-MCNC: 4.3 G/DL (ref 3.5–5.2)
ALBUMIN/GLOB SERPL: 1 {RATIO} (ref 1–2.5)
ALP SERPL-CCNC: 75 U/L (ref 35–104)
ALT SERPL-CCNC: 54 U/L (ref 10–35)
ANION GAP SERPL CALCULATED.3IONS-SCNC: 11 MMOL/L (ref 9–16)
AST SERPL-CCNC: 50 U/L (ref 10–35)
BILIRUB SERPL-MCNC: 0.6 MG/DL (ref 0–1.2)
BUN SERPL-MCNC: 13 MG/DL (ref 6–20)
CALCIUM SERPL-MCNC: 9.5 MG/DL (ref 8.6–10.4)
CHLORIDE SERPL-SCNC: 104 MMOL/L (ref 98–107)
CHOLEST SERPL-MCNC: 244 MG/DL (ref 0–199)
CHOLESTEROL/HDL RATIO: 5
CO2 SERPL-SCNC: 25 MMOL/L (ref 20–31)
CREAT SERPL-MCNC: 0.7 MG/DL (ref 0.5–0.9)
ERYTHROCYTE [DISTWIDTH] IN BLOOD BY AUTOMATED COUNT: 13 % (ref 11.8–14.4)
EST. AVERAGE GLUCOSE BLD GHB EST-MCNC: 91 MG/DL
GFR SERPL CREATININE-BSD FRML MDRD: >60 ML/MIN/1.73M2
GLUCOSE SERPL-MCNC: 84 MG/DL (ref 74–99)
HBA1C MFR BLD: 4.8 % (ref 4–6)
HCT VFR BLD AUTO: 42.8 % (ref 36.3–47.1)
HDLC SERPL-MCNC: 46 MG/DL
HGB BLD-MCNC: 14.6 G/DL (ref 11.9–15.1)
IRON SERPL-MCNC: 130 UG/DL (ref 37–145)
LDLC SERPL CALC-MCNC: 134 MG/DL (ref 0–100)
MCH RBC QN AUTO: 32.2 PG (ref 25.2–33.5)
MCHC RBC AUTO-ENTMCNC: 34.1 G/DL (ref 28.4–34.8)
MCV RBC AUTO: 94.5 FL (ref 82.6–102.9)
NRBC BLD-RTO: 0 PER 100 WBC
PLATELET # BLD AUTO: 268 K/UL (ref 138–453)
PMV BLD AUTO: 10.8 FL (ref 8.1–13.5)
POTASSIUM SERPL-SCNC: 4 MMOL/L (ref 3.7–5.3)
PROT SERPL-MCNC: 7.2 G/DL (ref 6.6–8.7)
RBC # BLD AUTO: 4.53 M/UL (ref 3.95–5.11)
SODIUM SERPL-SCNC: 140 MMOL/L (ref 136–145)
TRIGL SERPL-MCNC: 320 MG/DL
VIT B12 SERPL-MCNC: 290 PG/ML (ref 232–1245)
VLDLC SERPL CALC-MCNC: 64 MG/DL
WBC OTHER # BLD: 8.5 K/UL (ref 3.5–11.3)

## 2024-02-20 PROCEDURE — 83036 HEMOGLOBIN GLYCOSYLATED A1C: CPT

## 2024-02-20 PROCEDURE — 85027 COMPLETE CBC AUTOMATED: CPT

## 2024-02-20 PROCEDURE — 83540 ASSAY OF IRON: CPT

## 2024-02-20 PROCEDURE — 82607 VITAMIN B-12: CPT

## 2024-02-20 PROCEDURE — 80053 COMPREHEN METABOLIC PANEL: CPT

## 2024-02-20 PROCEDURE — 80061 LIPID PANEL: CPT

## 2024-02-20 PROCEDURE — 36415 COLL VENOUS BLD VENIPUNCTURE: CPT

## 2024-02-20 PROCEDURE — 82306 VITAMIN D 25 HYDROXY: CPT

## 2024-02-28 ENCOUNTER — OFFICE VISIT (OUTPATIENT)
Dept: ORTHOPEDIC SURGERY | Age: 39
End: 2024-02-28
Payer: COMMERCIAL

## 2024-02-28 VITALS — HEIGHT: 64 IN | WEIGHT: 215 LBS | BODY MASS INDEX: 36.7 KG/M2

## 2024-02-28 DIAGNOSIS — M76.32 IT BAND SYNDROME, LEFT: Primary | ICD-10-CM

## 2024-02-28 DIAGNOSIS — M25.552 LEFT HIP PAIN: ICD-10-CM

## 2024-02-28 DIAGNOSIS — M76.822 INSUFFICIENCY OF LEFT POSTERIOR TIBIAL TENDON: ICD-10-CM

## 2024-02-28 PROCEDURE — 99213 OFFICE O/P EST LOW 20 MIN: CPT

## 2024-02-28 PROCEDURE — G8427 DOCREV CUR MEDS BY ELIG CLIN: HCPCS

## 2024-02-28 PROCEDURE — G8417 CALC BMI ABV UP PARAM F/U: HCPCS

## 2024-02-28 PROCEDURE — G8484 FLU IMMUNIZE NO ADMIN: HCPCS

## 2024-02-28 PROCEDURE — 1036F TOBACCO NON-USER: CPT

## 2024-02-28 NOTE — PROGRESS NOTES
I performed a history and physical examination of the patient and discussed management with the resident. I reviewed the physician assistant/resident physician note and agree with the documented findings and plan of care. Any areas of disagreement are noted on the chart.   I have personally evaluated this patient and have completed at least one if not all key elements of the E/M (history, physical exam, and MDM).  Additional findings are as noted. I agree with the chief complaint, past medical history, past surgical history, allergies, medications, social and family history as documented unless otherwise noted below.     Electronically signed by Jaime Hogue DO on 2/28/2024 at 10:50 AM

## 2024-02-28 NOTE — PROGRESS NOTES
[facility]ANS [facility]  [address]  [facility] [place] [address]  Dept: [phone]  Dept Fax: [phone]        Ambulatory    Follow Up    Subjective:   More Goldman is a 38 year old female who presents to our office today for evaluation of her left hip pain and left ankle pain.  Patient was last seen in our clinic on 2/21/2024 for evaluation of her left ankle pain.  Patient has a known history of posterior tibial tendon insufficiency and is starting to experience discomfort on the bottom of her foot and medial aspect of her heel.  She was given home therapy exercises and a cam walking boot in order to allow reduction of inflammation and relief of symptoms.  Today patient states that her ankle/foot feels significantly better and that she has been able to come out of her boot and perform daily activities with significant reduction in her prior pain and discomfort.  However patient is here today for the pain of her left hip.  Patient denies any trauma, or injury to her hips.  However she does admit to pain at night discomfort when laying on her side.  Patient also states that she has increased pain on the outer aspect of her hip when standing from a sitting position or using stairs.  Patient states that the pain is more on the outside of her hip and does not radiate into her groin.  Patient denies any new numbness, tingling or weakness.  Patient does state that she does not take NSAIDs her Crohn's disease.  However she does use topical Voltaren, and Tylenol as needed for pain relief.  She has no other orthopedic events or concerns at this time, all of her questions were answered to her satisfaction.    Review of Systems   Constitutional: Negative for Fever and Chills.   HENT: Negative for Congestion.    Eyes: Negative for Blurred Vision and Double Vision.   Respiratory: Negative for Cough, Shortness of Breath and Wheezing.    Cardiovascular: Negative

## 2024-03-11 ENCOUNTER — HOSPITAL ENCOUNTER (OUTPATIENT)
Dept: PHYSICAL THERAPY | Age: 39
Setting detail: THERAPIES SERIES
Discharge: HOME | End: 2024-03-11
Payer: COMMERCIAL

## 2024-03-11 PROCEDURE — 97140 MANUAL THERAPY 1/> REGIONS: CPT

## 2024-03-11 PROCEDURE — 97110 THERAPEUTIC EXERCISES: CPT

## 2024-03-11 PROCEDURE — 97164 PT RE-EVAL EST PLAN CARE: CPT

## 2024-03-11 NOTE — CONSULTS
[x] [facility]  Outpatient Physical Therapy  [address]  Phone: [phone]  Fax: [phone] [] [facility] Outpatient Rehabilitation & Therapy  [address]   Phone: [phone]  Fax: [phone]  [] [facility]  [address]   [address]  Phone: [phone]   Fax: [phone]     Physical Therapy Evaluation    Date:  3/11/2024  Patient: More Goldman  : 1985  MRN: [id number]  Physician: Jess Baez and Jaime Hogue,      Insurance: [place] [employer] dual, AUTH REQUIRED AFTER 12TH VISIT Four visits in 2024 - 8 visits remaining  Medical Diagnosis: M76.32 (ICD-10-CM) - It band syndrome, left    Rehab Codes: M 25.552, M 62.838, M 79.651, R 26.89, R 29.3  Onset Date: 2023                                   Next 's appt: 4/10/24     Subjective:   CC:   All complaints are the same as previously - it has been going on so long she is just use to it now - and she keeps trying to do what the doctors say to help improve it.    Previously, whole leg would shake.  Ortho told patient patient may need deep tissue massage for IT band.      Continues: Pt states her pain starts in her left hip joint and travels up into her low back and down to her L knee. Pt states stairs, sudden stops while walking, getting up from the ground, and squatting/kneeling are painful. Has stopped home workouts.  Has two bedrooms she is redoing at home - that is her home workout now.      HPI: On and off pain in L hip that started about a year ago.  Pain started becoming more constant in 2023.  States she was put in a CAM boot for the month she was not in therapy - but that was discontinued and wants it to be stretched out now.      PMHx/Comorbidities:  [x] refer to full medical chart in [facility] [] Unremarkable    [] Pacemaker [] Cancer [] Arthritis   [] MI/Heart Problems [] Diabetes [] HTN   [] Obesity []

## 2024-03-15 ENCOUNTER — HOSPITAL ENCOUNTER (OUTPATIENT)
Dept: PHYSICAL THERAPY | Age: 39
Setting detail: THERAPIES SERIES
Discharge: HOME | End: 2024-03-15
Payer: COMMERCIAL

## 2024-03-15 PROCEDURE — 97110 THERAPEUTIC EXERCISES: CPT

## 2024-03-15 PROCEDURE — 97140 MANUAL THERAPY 1/> REGIONS: CPT

## 2024-03-15 NOTE — FLOWSHEET NOTE
[x] [facility]  Outpatient Rehabilitation &  Therapy  [address]  [phone]  [phone] [] [facility]  Outpatient Rehabilitation &  Therapy  [address]  P: [phone]  F: [phone] [] [facility]  Outpatient Rehabilitation &  Therapy  [address]  P: [phone]  F: [phone] [] [facility]  Outpatient Rehabilitation &  Therapy  [address]  [phone]  [phone] [] [facility]  Outpatient Rehabilitation &  Therapy  [address]   P: [phone]  F: [phone]  [] [facility]  Outpatient Rehabilitation &  Therapy  [address]  P: [phone]  F: [phone] [] [facility]  Outpatient Rehabilitation &  Therapy  [address]  P: [phone]  F: [phone] [] [facility]  Outpatient Rehabilitation &  Therapy  [address]  P: [phone]  F: [phone] [] [facility]  Outpatient Rehabilitation &  Therapy  [address]  P: [phone]  F: [phone]  [] [facility] Outpatient Rehabilitation &  Therapy  [address]  P: [phone]  F: [phone]     Physical Therapy Daily Treatment Note    Date:  3/15/2024  Patient Name:  More Goldman    :  1985  MRN: [id number]  Physician: Jess Baez and Jaime Hogue, DO                                Insurance: [place] [employer] dual, AUTH REQUIRED AFTER 12TH VISIT Four visits in 2024 - 8 visits remaining  Medical Diagnosis: M76.32 (ICD-10-CM) - It band syndrome, left    Rehab Codes: M 25.552, M 62.838, M 79.651, R 26.89, R 29.3  Onset Date: 2023                                   Next 's appt: 4/10/24   Visit# / total visits: 2/8    Cancels/No Shows: 0/0    Subjective:    Pain:  [x] Yes  [] No   Location: Left

## 2024-03-20 ENCOUNTER — HOSPITAL ENCOUNTER (OUTPATIENT)
Dept: PHYSICAL THERAPY | Age: 39
Setting detail: THERAPIES SERIES
Discharge: HOME | End: 2024-03-20
Payer: COMMERCIAL

## 2024-03-20 PROCEDURE — 97140 MANUAL THERAPY 1/> REGIONS: CPT

## 2024-03-20 PROCEDURE — 97110 THERAPEUTIC EXERCISES: CPT

## 2024-03-20 NOTE — FLOWSHEET NOTE
[x] [facility]  Outpatient Rehabilitation &  Therapy  [address]  [phone]  [phone]     Physical Therapy Daily Treatment Note    Date:  3/20/2024  Patient Name:  More Goldman      :  1985    MRN: [id number]  Physician: Jess Baez and Jaime Hogue, DO                                Insurance: [place] [employer] dual, AUTH REQUIRED AFTER 12TH VISIT Four visits in 2024 - 8 visits remaining  Medical Diagnosis: M76.32 (ICD-10-CM) - It band syndrome, left    Rehab Codes: M 25.552, M 62.838, M 79.651, R 26.89, R 29.3  Onset Date: 2023                                  Next 's appt: 4/10/24     Visit# / total visits: 3/8    Cancels/No Shows: 0/0    Subjective:    Pain:  [x] Yes  [] No Location: Left hip   Pain Rating: (0-10 scale) 4/10  Pain altered Tx:  [x] No  [] Yes  Action:  Comments: A bit sore throughout hip coming in this morning. Did note some relief in hip following last Tx and was able to go for a walk in the park later that day without issues until later.      Objective:  Modalities:   Precautions: standard  Exercises:  Exercise Reps/ Time Weight/ Level Comments   MET to correct left leg shorter (to right leg); shotgun technique. 1 x  Upon arrival, left leg approx 1/2\" shorter.  After 1 round of MET, improved to be equal         Hypervolt - in side lying with pillow between knees 15 min  Lateral hip and glut left side         Hip Abduction 20x     Clamshell 20x     Reverse clamshell 20x     Hip hike 20x     Prone hip ext with knee flexion 20x     Prone Hip ext 20x     Leg pulls 3 min  Straight line hip pull in side lying                                                               Other:      Treatment Charges: Mins Units   []  Modalities     [x]  Ther Exercise 20 1   [x]  Manual Therapy 15 1   []  Ther Activities     []  Neuro Re-ed     []  Vasocompression     [] Gait     []  Other     Total Billable time 35 2       Assessment: [x]

## 2024-03-22 ENCOUNTER — HOSPITAL ENCOUNTER (OUTPATIENT)
Dept: PHYSICAL THERAPY | Age: 39
Setting detail: THERAPIES SERIES
Discharge: HOME | End: 2024-03-22
Payer: COMMERCIAL

## 2024-03-22 PROCEDURE — 97140 MANUAL THERAPY 1/> REGIONS: CPT

## 2024-03-22 PROCEDURE — 97110 THERAPEUTIC EXERCISES: CPT

## 2024-03-22 NOTE — FLOWSHEET NOTE
[x] [facility]  Outpatient Rehabilitation &  Therapy  [address]  [phone]  [phone]     Physical Therapy Daily Treatment Note    Date:  3/22/2024  Patient Name:  More Goldman      :  1985    MRN: [id number]  Physician: Jess Baez and Jaime Hogue, DO                                Insurance: [place] [employer] dual, AUTH REQUIRED AFTER 12TH VISIT Four visits in 2024 - 8 visits remaining  Medical Diagnosis: M76.32 (ICD-10-CM) - It band syndrome, left    Rehab Codes: M 25.552, M 62.838, M 79.651, R 26.89, R 29.3  Onset Date: 2023                                  Next 's appt: 4/10/24     Visit# / total visits:     Cancels/No Shows: 0/0    Subjective:    Pain:  [x] Yes  [] No Location: Left hip   Pain Rating: (0-10 scale) 410  Pain altered Tx:  [x] No  [] Yes  Action:  Comments: A bit sore throughout hip coming in this morning. Did note some relief in hip following last Tx and was able to go for a walk in the park later that day without issues until later.      Objective:  Modalities:   Precautions: standard  Exercises:  Exercise Reps/ Time Weight/ Level Comments   Bicycle  5 min M 3 Added 3/22/24          Standing      3 - Way Hip 20x ea Blue Bilaterally  Added 3/22/24          Supine      Lateral Hamstring/IT band stretch w/green strap 3x30\"  Added 3/22/24    SL Bridge 20x  Added 3/22/24                Hip Abduction 20x Blue Added band 3/22/24    Clamshell 20x Blue Added band 3/22/24    Reverse clamshell 20x Blue Added band 3/22/24    Hip hike 20x     Prone hip ext with knee flexion 20x     Prone Hip ext 20x     Leg pulls 3 min  Straight line hip pull in side lying         Hypervolt - in side lying with pillow between knees 10-15 min  Lateral hip and glut left side                                                   Other:    Manual:   Treatment Charges: Mins Units   []  Modalities     [x]  Ther Exercise 26 2   [x]  Manual Therapy 12 1   []

## 2024-03-25 ENCOUNTER — HOSPITAL ENCOUNTER (OUTPATIENT)
Dept: PHYSICAL THERAPY | Age: 39
Setting detail: THERAPIES SERIES
Discharge: HOME | End: 2024-03-25
Payer: COMMERCIAL

## 2024-03-25 PROCEDURE — 97110 THERAPEUTIC EXERCISES: CPT

## 2024-03-25 PROCEDURE — 97140 MANUAL THERAPY 1/> REGIONS: CPT

## 2024-03-25 NOTE — FLOWSHEET NOTE
[x] [facility]  Outpatient Rehabilitation &  Therapy  [address]  [phone]  [phone]     Physical Therapy Daily Treatment Note    Date:  3/25/2024  Patient Name:  More Goldman      :  1985    MRN: [id number]  Physician: Jess Baez and Jaime Hogue, DO                                Insurance: [employer] dual, AUTH REQUIRED AFTER 12TH VISIT Four visits in 2024 - 8 visits remaining  Medical Diagnosis: M76.32 (ICD-10-CM) - It band syndrome, left    Rehab Codes: M 25.552, M 62.838, M 79.651, R 26.89, R 29.3  Onset Date: 2023                                  Next 's appt: 4/10/24     Visit# / total visits:     Cancels/No Shows: 0/0    Subjective:    Pain:  [x] Yes  [] No Location: Left hip   Pain Rating: (0-10 scale) 2-3/10  Pain altered Tx:  [x] No  [] Yes  Action:  Comments: Had some pain when first getting up, but took some Ibuprofen and is feeling a little better currently. Had a pretty busy weekend, moving furniture, doing some spring cleaning - no major reports of pain with these increased activities, but did find times where she had to rest and us HP but was able to resume activities afterwards.     Objective:  Modalities:   Precautions: standard  Exercises:  Exercise Reps/ Time Weight/ Level Comments 24     Bicycle  5 min M 3 Added 3/22/24  X          Standing       3 - Way Hip 20x ea Blue Bilaterally  Added 3/22/24  X   SL Box Squat 20x 24 inch Added 3/25/24 X   Lateral Heel Tap 20x 6 inch Added 3/25/24 X   SL Sidelying TG Squats  20x Level 30 Added 3/25/24 X   Matrix Knee Extension 2x10 30 lb Added 3/25/24 X   Matrix Knee Flexion 2x10 30 lb Added 3/25/24 X          Supine       Lateral Hamstring/IT band stretch w/green strap 3x30\"  Added 3/22/24  X   SL Bridge 20x  Added 3/22/24  X                 Hip Abduction 20x Blue Added band 3/22/24     Clamshell 20x Blue Added band 3/22/24     Reverse clamshell 20x Blue Added band

## 2024-03-27 ENCOUNTER — HOSPITAL ENCOUNTER (OUTPATIENT)
Dept: PHYSICAL THERAPY | Age: 39
Setting detail: THERAPIES SERIES
Discharge: HOME | End: 2024-03-27
Payer: COMMERCIAL

## 2024-03-27 PROCEDURE — 97110 THERAPEUTIC EXERCISES: CPT

## 2024-03-27 PROCEDURE — 97140 MANUAL THERAPY 1/> REGIONS: CPT

## 2024-03-27 NOTE — FLOWSHEET NOTE
[x] [facility]  Outpatient Rehabilitation &  Therapy   [address]  [phone]  [phone]     Physical Therapy Daily Treatment Note    Date:  3/27/2024  Patient Name:  More Goldman      :  1985    MRN: [id number]  Physician: Jess Baez and Jaime Hogue, DO                                Insurance: [employer] dual, AUTH REQUIRED AFTER 12TH VISIT Four visits in 2024 - 8 visits remaining  Medical Diagnosis: M76.32 (ICD-10-CM) - It band syndrome, left    Rehab Codes: M 25.552, M 62.838, M 79.651, R 26.89, R 29.3  Onset Date: 2023                                  Next 's appt: 4/10/24     Visit# / total visits:     Cancels/No Shows: 0/0    Subjective:    Pain:  [x] Yes  [] No Location: Left hip   Pain Rating: (0-10 scale) 4/10  Pain altered Tx:  [x] No  [] Yes  Action:  Comments:  Patient reports moderate mm soreness following last Tx and progressions made. A mixture of pain and residual mm soreness today, mostly through anterolateral thigh.      Objective:  Modalities:   Precautions: standard  Exercises:  Exercise Reps/ Time Weight/ Level Comments 24     Bicycle  5 min M 3 Added 3/22/24  X          Standing       Calf Stretch on Incline 3x30\"   X   Hamstring Stretch 3x30\"   X   3 - Way Hip 20x ea Blue Bilaterally  Added 3/22/24     SL Box Squat 20x 24 inch Added 3/25/24 X   Lateral Heel Tap 20x 6 inch Added 3/25/24 X   BAPS 20x ea Level 2 Ant/Post, M/L, CW/CCW  Added 3/27/24 X   SLS on Blue Foam 3x45\"  Added 3/27/24 X          SL Sidelying TG Squats  20x Level 30 Added 3/25/24 X   Matrix Knee Extension 2x10 30 lb Added 3/25/24 X   Matrix Knee Flexion 2x10 30 lb Added 3/25/24 X          Supine       Lateral Hamstring/IT band stretch w/green strap 3x30\"  Added 3/22/24  X   SL Bridge 20x  Added 3/22/24  X   4 - Way Ankle 20x ea Blue            Hip Abduction 20x Blue Added band 3/22/24     Clamshell 20x Blue Added band 3/22/24     Reverse

## 2024-04-10 ENCOUNTER — HOSPITAL ENCOUNTER (OUTPATIENT)
Dept: PHYSICAL THERAPY | Age: 39
Setting detail: THERAPIES SERIES
Discharge: HOME | End: 2024-04-10
Payer: COMMERCIAL

## 2024-04-10 ENCOUNTER — OFFICE VISIT (OUTPATIENT)
Dept: ORTHOPEDIC SURGERY | Age: 39
End: 2024-04-10

## 2024-04-10 VITALS — HEIGHT: 64 IN | BODY MASS INDEX: 36.7 KG/M2 | WEIGHT: 215 LBS

## 2024-04-10 DIAGNOSIS — M25.552 LEFT HIP PAIN: Primary | ICD-10-CM

## 2024-04-10 DIAGNOSIS — M76.822 INSUFFICIENCY OF LEFT POSTERIOR TIBIAL TENDON: ICD-10-CM

## 2024-04-10 DIAGNOSIS — M76.32 IT BAND SYNDROME, LEFT: ICD-10-CM

## 2024-04-10 PROCEDURE — 97110 THERAPEUTIC EXERCISES: CPT

## 2024-04-10 NOTE — PROGRESS NOTES
[facility] ORTHO SPECIALISTS  [address]  [place] [address]  Dept: [phone]  Dept Fax: [phone]        Orthopaedic Clinic Follow Up      Subjective:     More Goldman is a 38 y.o. female who presents to the clinic today for routine followup regarding her left hip pain and left ankle pain. Patient states that regarding her hip pain, it is localized to the lateral aspect of the hip and radiates down the lateral aspect of the leg. She does sometimes have some groin pain worse with internal rotation motion. The hip pain is intermittently bothersome. She has been working with physical therapy for stretching as well as deep tissue massage using a \"massage gun\" focusing on IT band syndrome which does seem to help her pain. This provides a decent amount of relief. She states that she does not have a massage gun at home and so does not get to utilize this as frequently as she wants.     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 She states that she only just received her orthotics for her shoes two weeks ago and is still adjusting to them. She does believe these help but still does have ankle pain after standing for long periods of time. Ankle pain is localized to the posteromedial ankle which radiates to the medial calcaneus. She does use Voltaren cream but doesn't believe this helps. She is unable to take NSAIDs due to history of Crohn's disease, but does note that on occasion, she has taken ibuprofen which does help. She has been advised by her PCP against taking NSAIDs orally.     Patient denies any numbness/tingling in the left lower extremity. She denies any history of trauma or injury. She states that overall she feels like both her hip pain and ankle pain are improving, although her progress has been slow.     ROS:  Gen: No fevers/chills   Cardio: no chest pain   Lungs: no

## 2024-04-10 NOTE — PROGRESS NOTES
[x] [facility]  Outpatient Rehabilitation &  Therapy  [address]  [phone]  [phone] [] [facility]  Outpatient Rehabilitation &  Therapy  [address]  P: [phone]  F: [phone] [] [facility]  Outpatient Rehabilitation &  Therapy  [address]  P: [phone]  F: [phone] [] [facility]  Outpatient Rehabilitation &  Therapy  [address]  [phone]  [phone] [] [facility]  Outpatient Rehabilitation &  Therapy  [address]   P: [phone]  F: [phone]  [] [facility]  Outpatient Rehabilitation &  Therapy  [address]  P: [phone]  F: [phone] [] [facility]  Outpatient Rehabilitation &  Therapy  [address]  P: [phone]  F: [phone] [] [facility]  Outpatient Rehabilitation &  Therapy  [address]  P: [phone]  F: [phone] [] [facility]  Outpatient Rehabilitation &  Therapy  [address]  P: [phone]  F: [phone]  [] [facility] Outpatient Rehabilitation &  Therapy  [address]  P: [phone]  F: [phone]     Physical Therapy Progress Note    Date: 4/10/2024      Patient: More Goldman  : 1985  MRN: [id number]        [x] [facility]  Outpatient Rehabilitation &  Therapy  [address]  [phone]  [phone]      Physical Therapy Daily Treatment Note     Date:  3/27/2024  Patient Name:  More Goldman                   :  1985                       MRN: [id number]  Physician: Jess Baez and Jaime Hogue,                                 Insurance: [facility] dual, AUTH REQUIRED AFTER 12TH VISIT Four visits in 2024 - 8 visits remaining  Medical Diagnosis: M76.32 (ICD-10-CM) - It

## 2024-04-10 NOTE — FLOWSHEET NOTE
[x] [facility]  Outpatient Rehabilitation &  Therapy  [address]  [phone]  [phone]     Physical Therapy Daily Treatment Note    Date:  4/10/2024  Patient Name:  More Goldman      :  1985    MRN: [id number]  Physician: Jess Baez and Jaime Hogue, DO                                Insurance: [employer] dual, AUTH REQUIRED AFTER 12TH VISIT Four visits in 2024 - 8 visits remaining  Medical Diagnosis: M76.32 (ICD-10-CM) - It band syndrome, left    Rehab Codes: M 25.552, M 62.838, M 79.651, R 26.89, R 29.3  Onset Date: 2023                                  Next 's appt: 4/10/24     Visit# / total visits: 7/8    Cancels/No Shows: 0/0    Subjective:    Pain:  [x] Yes  [] No Location: Left ankle   Pain Rating: (0-10 scale) 3.5/10  Pain altered Tx:  [x] No  [] Yes  Action:  Comments:  Hip is doing well overall. Feels like most of her pain is in her ankle currently. Spent a lot of time walking around barefoot over the weekend and equates increased pain to this.     Objective:  Modalities:   Precautions: standard  Exercises:  Exercise Reps/ Time Weight/ Level Comments 04/10/24     Bicycle  5 min M 3 Added 3/22/24  X          Standing       Calf Stretch on Incline 3x30\"   X   Hamstring Stretch 3x30\"   X   3 - Way Hip 20x ea Blue Bilaterally  Added 3/22/24     SL Box Squat 20x 24 inch Added 3/25/24    Lateral Heel Tap 20x 6 inch Added 3/25/24 X   BAPS 20x ea Level 2 Ant/Post, M/L, CW/CCW  Added 3/27/24 X   SLS on Blue Foam 3x45\"  Added 3/27/24 X   SL 3 - Way Hip On foam 10x ea  L LE SLE - slow controlled motions - maintaining balance X   SL Sidelying TG Squats  20x Level 30 Added 3/25/24 X   Matrix Knee Extension 2x10 30 lb Added 3/25/24 X   Matrix Knee Flexion 2x10 30 lb Added 3/25/24 X   Leg Press 20x 40 lb Added 4/10/24 X          Supine       Lateral Hamstring/IT band stretch w/green strap 3x30\"  Added 3/22/24  X   SL Bridge 20x  Added 3/22/24

## 2024-04-17 ENCOUNTER — HOSPITAL ENCOUNTER (OUTPATIENT)
Dept: PHYSICAL THERAPY | Age: 39
Setting detail: THERAPIES SERIES
Discharge: HOME | End: 2024-04-17
Payer: COMMERCIAL

## 2024-04-17 PROCEDURE — 97110 THERAPEUTIC EXERCISES: CPT

## 2024-04-17 NOTE — FLOWSHEET NOTE
[x] [facility]  Outpatient Rehabilitation &  Therapy  [address]  [phone]  [phone]     Physical Therapy Daily Treatment Note    Date:  2024  Patient Name:  More Goldman      :  1985    MRN: [id number]  Physician: Jess Baez and Jaime Hogue, DO                                Insurance: [employer] dual, AUTH REQUIRED AFTER 12TH VISIT Four visits in 2024 - 8 visits remaining  Medical Diagnosis: M76.32 (ICD-10-CM) - It band syndrome, left    Rehab Codes: M 25.552, M 62.838, M 79.651, R 26.89, R 29.3  Onset Date: 2023                                  Next 's appt: 24 Ortho    Visit# / total visits:     Cancels/No Shows: 0/0    Subjective:    Pain:  [x] Yes  [] No Location: Left medial ankle/plantar surface of foot   Pain Rating: (0-10 scale) 4.5/10  Pain altered Tx:  [x] No  [] Yes  Action:  Comments:  A bit of increased pain in her medial foot yesterday and today. Had a pretty light weekend with minimal pain, so she has tried doing more projects around her house. Increased pain may be from this.     Objective:  Modalities:   Precautions: standard  Exercises:  Exercise Reps/ Time Weight/ Level Comments 24     Bicycle  5 min M 3 Added 3/22/24  X          Standing       Calf Stretch on Incline 3x30\"   X   Hamstring Stretch 3x30\"   X   3 - Way Hip 20x ea Blue Bilaterally  Added 3/22/24     SL Box Squat 20x 24 inch Added 3/25/24    Lateral Heel Tap 20x 6 inch Added 3/25/24 X   BAPS 20x ea Level 2 Ant/Post, M/L, CW/CCW  Added 3/27/24 X   SLS on Blue Foam - no shoe 3x45\"  Added 3/27/24 X   SL 3 - Way Hip On foam 10x ea  L LE SLE - slow controlled motions - maintaining balance X   SL Sidelying TG Squats  20x Level 30 Added 3/25/24    Matrix Knee Extension 2x10 30 lb Added 3/25/24    Matrix Knee Flexion 2x10 30 lb Added 3/25/24    Leg Press 20x 40 lb Added 4/10/24           Supine       Lateral Hamstring/IT band stretch w/green

## 2024-05-13 ENCOUNTER — HOSPITAL ENCOUNTER (OUTPATIENT)
Age: 39
Discharge: HOME | End: 2024-05-13
Payer: COMMERCIAL

## 2024-05-13 LAB
25(OH)D3 SERPL-MCNC: 33.9 NG/ML (ref 30–100)
ALBUMIN SERPL-MCNC: 4.2 G/DL (ref 3.5–5.2)
ALBUMIN/GLOB SERPL: 1 {RATIO} (ref 1–2.5)
ALP SERPL-CCNC: 69 U/L (ref 35–104)
ALT SERPL-CCNC: 52 U/L (ref 10–35)
ANION GAP SERPL CALCULATED.3IONS-SCNC: 11 MMOL/L (ref 9–16)
AST SERPL-CCNC: 38 U/L (ref 10–35)
BILIRUB SERPL-MCNC: 0.6 MG/DL (ref 0–1.2)
BUN SERPL-MCNC: 8 MG/DL (ref 6–20)
CALCIUM SERPL-MCNC: 9 MG/DL (ref 8.6–10.4)
CHLORIDE SERPL-SCNC: 104 MMOL/L (ref 98–107)
CHOLEST SERPL-MCNC: 199 MG/DL (ref 0–199)
CHOLESTEROL/HDL RATIO: 5
CO2 SERPL-SCNC: 27 MMOL/L (ref 20–31)
CREAT SERPL-MCNC: 0.8 MG/DL (ref 0.5–0.9)
ERYTHROCYTE [DISTWIDTH] IN BLOOD BY AUTOMATED COUNT: 13.3 % (ref 11.8–14.4)
EST. AVERAGE GLUCOSE BLD GHB EST-MCNC: 91 MG/DL
GFR, ESTIMATED: >90 ML/MIN/1.73M2
GLUCOSE SERPL-MCNC: 90 MG/DL (ref 74–99)
HBA1C MFR BLD: 4.8 % (ref 4–6)
HCT VFR BLD AUTO: 40.7 % (ref 36.3–47.1)
HDLC SERPL-MCNC: 43 MG/DL
HGB BLD-MCNC: 13.8 G/DL (ref 11.9–15.1)
IRON SERPL-MCNC: 77 UG/DL (ref 37–145)
LDLC SERPL CALC-MCNC: 107 MG/DL (ref 0–100)
MCH RBC QN AUTO: 32.2 PG (ref 25.2–33.5)
MCHC RBC AUTO-ENTMCNC: 33.9 G/DL (ref 28.4–34.8)
MCV RBC AUTO: 94.9 FL (ref 82.6–102.9)
NRBC BLD-RTO: 0 PER 100 WBC
PLATELET # BLD AUTO: 249 K/UL (ref 138–453)
PMV BLD AUTO: 10.7 FL (ref 8.1–13.5)
POTASSIUM SERPL-SCNC: 3.2 MMOL/L (ref 3.7–5.3)
PROT SERPL-MCNC: 7.1 G/DL (ref 6.6–8.7)
RBC # BLD AUTO: 4.29 M/UL (ref 3.95–5.11)
SODIUM SERPL-SCNC: 142 MMOL/L (ref 136–145)
TRIGL SERPL-MCNC: 243 MG/DL
VIT B12 SERPL-MCNC: 195 PG/ML (ref 232–1245)
VLDLC SERPL CALC-MCNC: 49 MG/DL
WBC OTHER # BLD: 7.7 K/UL (ref 3.5–11.3)

## 2024-05-13 PROCEDURE — 82306 VITAMIN D 25 HYDROXY: CPT

## 2024-05-13 PROCEDURE — 82607 VITAMIN B-12: CPT

## 2024-05-13 PROCEDURE — 80053 COMPREHEN METABOLIC PANEL: CPT

## 2024-05-13 PROCEDURE — 85027 COMPLETE CBC AUTOMATED: CPT

## 2024-05-13 PROCEDURE — 36415 COLL VENOUS BLD VENIPUNCTURE: CPT

## 2024-05-13 PROCEDURE — 83036 HEMOGLOBIN GLYCOSYLATED A1C: CPT

## 2024-05-13 PROCEDURE — 80061 LIPID PANEL: CPT

## 2024-05-13 PROCEDURE — 83540 ASSAY OF IRON: CPT

## 2024-06-12 ENCOUNTER — OFFICE VISIT (OUTPATIENT)
Dept: ORTHOPEDIC SURGERY | Age: 39
End: 2024-06-12
Payer: COMMERCIAL

## 2024-06-12 VITALS — BODY MASS INDEX: 34.38 KG/M2 | WEIGHT: 194 LBS | HEIGHT: 63 IN

## 2024-06-12 DIAGNOSIS — M72.2 PLANTAR FASCIITIS OF LEFT FOOT: Primary | ICD-10-CM

## 2024-06-12 PROCEDURE — 99213 OFFICE O/P EST LOW 20 MIN: CPT

## 2024-06-12 PROCEDURE — G8417 CALC BMI ABV UP PARAM F/U: HCPCS

## 2024-06-12 PROCEDURE — G8427 DOCREV CUR MEDS BY ELIG CLIN: HCPCS

## 2024-06-12 PROCEDURE — 1036F TOBACCO NON-USER: CPT

## 2024-06-12 NOTE — PROGRESS NOTES
Chills.   HENT: Negative for Congestion.    Eyes: Negative for Blurred Vision and Double Vision.   Respiratory: Negative for Cough, Shortness of Breath and Wheezing.    Cardiovascular: Negative for Chest Pain and Palpitations.   Gastrointestinal: Negative for Nausea. Negative for Vomiting.   Musculoskeletal: Positive for Myalgias and Joint Pain (left heel/foot pain).   Skin: Negative for Itching and Rash.   Neurological: Negative for Dizziness, Sensory Change and Headaches.   Psychiatric/Behavioral: Negative for Depression and Suicidal Ideas.     Objective:   General: AAOx3, NAD.  Ortho Exam  Neuro: Alert. Oriented.  Eyes: Extra-Ocular Muscles Intact.  Mouth: Oral Mucosa Moist. No Perioral Lesions.  Pulm: Respirations Unlabored and Regular.  Skin: Warm, Well Perfused.  Psych: Patient has good fund of knowledge and displays understanding of Exam, Diagnosis, and Plan.  MSK:   LLE: Skin is intact to the left lower extremity.  There are no abrasions, lacerations or gross deformities.  Patient is able to perform a standing heel raise.  Patient does have mild increased pronation of her left foot when compared to her right when standing.  There is tenderness palpation over the medial aspect of the distal portion of the calcaneus.  There is increased tenderness over the plantar fascia.  Sensation is intact light touch across SPN/DPN/sural/saphenous/plantar nerve distributions.  Motor functions grossly intact to TA/GS/EHL/FHL motor complexes.  Limb is warm and perfused with brisk capillary refill.    Radiology:   No new radiographs were taken during this visit.  However prior imaging was independent reviewed and discussed with the patient.    Assessment:        1. Plantar fasciitis of left foot         Plan:   Assessment & Plan     - I reviewed the imaging with the patient.  - We discussed the likely cause of their presenting complaint being fasciitis of the left foot.  - We discussed various treatment alternatives including

## 2024-06-19 ENCOUNTER — HOSPITAL ENCOUNTER (OUTPATIENT)
Age: 39
Discharge: HOME | End: 2024-06-19
Payer: COMMERCIAL

## 2024-06-19 LAB
ALBUMIN SERPL-MCNC: 4.5 G/DL (ref 3.5–5.2)
ALBUMIN/GLOB SERPL: 2 {RATIO} (ref 1–2.5)
ALP SERPL-CCNC: 83 U/L (ref 35–104)
ALT SERPL-CCNC: 51 U/L (ref 10–35)
ANION GAP SERPL CALCULATED.3IONS-SCNC: 13 MMOL/L (ref 9–16)
AST SERPL-CCNC: 37 U/L (ref 10–35)
BILIRUB SERPL-MCNC: 0.5 MG/DL (ref 0–1.2)
BUN SERPL-MCNC: 14 MG/DL (ref 6–20)
CALCIUM SERPL-MCNC: 9.5 MG/DL (ref 8.6–10.4)
CHLORIDE SERPL-SCNC: 103 MMOL/L (ref 98–107)
CO2 SERPL-SCNC: 25 MMOL/L (ref 20–31)
CREAT SERPL-MCNC: 0.9 MG/DL (ref 0.5–0.9)
ERYTHROCYTE [DISTWIDTH] IN BLOOD BY AUTOMATED COUNT: 13.1 % (ref 11.8–14.4)
GFR, ESTIMATED: 83 ML/MIN/1.73M2
GLUCOSE SERPL-MCNC: 93 MG/DL (ref 74–99)
HCT VFR BLD AUTO: 47.3 % (ref 36.3–47.1)
HGB BLD-MCNC: 15.4 G/DL (ref 11.9–15.1)
IRON SERPL-MCNC: 69 UG/DL (ref 37–145)
MAGNESIUM SERPL-MCNC: 2 MG/DL (ref 1.6–2.6)
MCH RBC QN AUTO: 31.8 PG (ref 25.2–33.5)
MCHC RBC AUTO-ENTMCNC: 32.6 G/DL (ref 28.4–34.8)
MCV RBC AUTO: 97.5 FL (ref 82.6–102.9)
NRBC BLD-RTO: 0 PER 100 WBC
PLATELET # BLD AUTO: 296 K/UL (ref 138–453)
PMV BLD AUTO: 10.5 FL (ref 8.1–13.5)
POTASSIUM SERPL-SCNC: 3.7 MMOL/L (ref 3.7–5.3)
PROT SERPL-MCNC: 7.5 G/DL (ref 6.6–8.7)
RBC # BLD AUTO: 4.85 M/UL (ref 3.95–5.11)
SODIUM SERPL-SCNC: 141 MMOL/L (ref 136–145)
VIT B12 SERPL-MCNC: 320 PG/ML (ref 232–1245)
WBC OTHER # BLD: 11 K/UL (ref 3.5–11.3)

## 2024-06-19 PROCEDURE — 83735 ASSAY OF MAGNESIUM: CPT

## 2024-06-19 PROCEDURE — 85027 COMPLETE CBC AUTOMATED: CPT

## 2024-06-19 PROCEDURE — 83540 ASSAY OF IRON: CPT

## 2024-06-19 PROCEDURE — 36415 COLL VENOUS BLD VENIPUNCTURE: CPT

## 2024-06-19 PROCEDURE — 82607 VITAMIN B-12: CPT

## 2024-06-19 PROCEDURE — 80053 COMPREHEN METABOLIC PANEL: CPT

## 2024-08-19 ENCOUNTER — HOSPITAL ENCOUNTER (OUTPATIENT)
Age: 39
Discharge: HOME | End: 2024-08-19
Payer: COMMERCIAL

## 2024-08-19 LAB
25(OH)D3 SERPL-MCNC: 43.6 NG/ML (ref 30–100)
ALBUMIN SERPL-MCNC: 4.5 G/DL (ref 3.5–5.2)
ALBUMIN/GLOB SERPL: 2 {RATIO} (ref 1–2.5)
ALP SERPL-CCNC: 65 U/L (ref 35–104)
ALT SERPL-CCNC: 21 U/L (ref 10–35)
ANION GAP SERPL CALCULATED.3IONS-SCNC: 12 MMOL/L (ref 9–16)
AST SERPL-CCNC: 20 U/L (ref 10–35)
BILIRUB SERPL-MCNC: 0.9 MG/DL (ref 0–1.2)
BUN SERPL-MCNC: 10 MG/DL (ref 6–20)
CALCIUM SERPL-MCNC: 9.5 MG/DL (ref 8.6–10.4)
CHLORIDE SERPL-SCNC: 105 MMOL/L (ref 98–107)
CHOLEST SERPL-MCNC: 195 MG/DL (ref 0–199)
CHOLESTEROL/HDL RATIO: 4
CO2 SERPL-SCNC: 26 MMOL/L (ref 20–31)
CREAT SERPL-MCNC: 0.8 MG/DL (ref 0.5–0.9)
ERYTHROCYTE [DISTWIDTH] IN BLOOD BY AUTOMATED COUNT: 12.9 % (ref 11.8–14.4)
EST. AVERAGE GLUCOSE BLD GHB EST-MCNC: 88 MG/DL
GFR, ESTIMATED: >90 ML/MIN/1.73M2
GLUCOSE SERPL-MCNC: 80 MG/DL (ref 74–99)
HBA1C MFR BLD: 4.7 % (ref 4–6)
HCT VFR BLD AUTO: 40.5 % (ref 36.3–47.1)
HDLC SERPL-MCNC: 51 MG/DL
HGB BLD-MCNC: 14.2 G/DL (ref 11.9–15.1)
IRON SERPL-MCNC: 109 UG/DL (ref 37–145)
LDLC SERPL CALC-MCNC: 103 MG/DL (ref 0–100)
MAGNESIUM SERPL-MCNC: 1.9 MG/DL (ref 1.6–2.6)
MCH RBC QN AUTO: 32.9 PG (ref 25.2–33.5)
MCHC RBC AUTO-ENTMCNC: 35.1 G/DL (ref 28.4–34.8)
MCV RBC AUTO: 93.8 FL (ref 82.6–102.9)
NRBC BLD-RTO: 0 PER 100 WBC
PLATELET # BLD AUTO: 268 K/UL (ref 138–453)
PMV BLD AUTO: 10.8 FL (ref 8.1–13.5)
POTASSIUM SERPL-SCNC: 3.1 MMOL/L (ref 3.7–5.3)
PROT SERPL-MCNC: 7.1 G/DL (ref 6.6–8.7)
RBC # BLD AUTO: 4.32 M/UL (ref 3.95–5.11)
SODIUM SERPL-SCNC: 143 MMOL/L (ref 136–145)
TRIGL SERPL-MCNC: 206 MG/DL (ref 0–149)
VIT B12 SERPL-MCNC: 283 PG/ML (ref 232–1245)
VLDLC SERPL CALC-MCNC: 41 MG/DL
WBC OTHER # BLD: 10.1 K/UL (ref 3.5–11.3)

## 2024-08-19 PROCEDURE — 82607 VITAMIN B-12: CPT

## 2024-08-19 PROCEDURE — 80053 COMPREHEN METABOLIC PANEL: CPT

## 2024-08-19 PROCEDURE — 82306 VITAMIN D 25 HYDROXY: CPT

## 2024-08-19 PROCEDURE — 83036 HEMOGLOBIN GLYCOSYLATED A1C: CPT

## 2024-08-19 PROCEDURE — 83540 ASSAY OF IRON: CPT

## 2024-08-19 PROCEDURE — 80061 LIPID PANEL: CPT

## 2024-08-19 PROCEDURE — 85027 COMPLETE CBC AUTOMATED: CPT

## 2024-08-19 PROCEDURE — 36415 COLL VENOUS BLD VENIPUNCTURE: CPT

## 2024-08-19 PROCEDURE — 83735 ASSAY OF MAGNESIUM: CPT

## 2024-08-26 ENCOUNTER — HOSPITAL ENCOUNTER (OUTPATIENT)
Dept: GENERAL RADIOLOGY | Age: 39
Discharge: HOME | End: 2024-08-28
Payer: COMMERCIAL

## 2024-08-26 ENCOUNTER — HOSPITAL ENCOUNTER (OUTPATIENT)
Age: 39
Discharge: HOME | End: 2024-08-28
Payer: COMMERCIAL

## 2024-08-26 DIAGNOSIS — R31.9 HEMATURIA SYNDROME: ICD-10-CM

## 2024-08-26 PROCEDURE — 74018 RADEX ABDOMEN 1 VIEW: CPT

## 2024-09-04 ENCOUNTER — TRANSCRIBE ORDERS (OUTPATIENT)
Dept: ADMINISTRATIVE | Age: 39
End: 2024-09-04

## 2024-09-04 DIAGNOSIS — N20.0 RENAL CALCULI: Primary | ICD-10-CM

## 2024-09-04 DIAGNOSIS — R31.9 HEMATURIA, UNSPECIFIED TYPE: ICD-10-CM

## 2024-09-13 ENCOUNTER — HOSPITAL ENCOUNTER (OUTPATIENT)
Age: 39
Discharge: HOME | End: 2024-09-13
Payer: COMMERCIAL

## 2024-09-13 LAB
ALBUMIN SERPL-MCNC: 4.5 G/DL (ref 3.5–5.2)
ALBUMIN/GLOB SERPL: 1 {RATIO} (ref 1–2.5)
ALP SERPL-CCNC: 78 U/L (ref 35–104)
ALT SERPL-CCNC: 32 U/L (ref 10–35)
ANION GAP SERPL CALCULATED.3IONS-SCNC: 11 MMOL/L (ref 9–16)
AST SERPL-CCNC: 31 U/L (ref 10–35)
BILIRUB SERPL-MCNC: 0.6 MG/DL (ref 0–1.2)
BUN SERPL-MCNC: 11 MG/DL (ref 6–20)
CALCIUM SERPL-MCNC: 9.7 MG/DL (ref 8.6–10.4)
CHLORIDE SERPL-SCNC: 104 MMOL/L (ref 98–107)
CO2 SERPL-SCNC: 26 MMOL/L (ref 20–31)
CREAT SERPL-MCNC: 0.7 MG/DL (ref 0.5–0.9)
ERYTHROCYTE [DISTWIDTH] IN BLOOD BY AUTOMATED COUNT: 13 % (ref 11.8–14.4)
GFR, ESTIMATED: >90 ML/MIN/1.73M2
GLUCOSE SERPL-MCNC: 83 MG/DL (ref 74–99)
HCT VFR BLD AUTO: 43.5 % (ref 36.3–47.1)
HGB BLD-MCNC: 14.7 G/DL (ref 11.9–15.1)
IRON SERPL-MCNC: 89 UG/DL (ref 37–145)
MAGNESIUM SERPL-MCNC: 1.8 MG/DL (ref 1.6–2.6)
MCH RBC QN AUTO: 31.9 PG (ref 25.2–33.5)
MCHC RBC AUTO-ENTMCNC: 33.8 G/DL (ref 28.4–34.8)
MCV RBC AUTO: 94.4 FL (ref 82.6–102.9)
NRBC BLD-RTO: 0 PER 100 WBC
PLATELET # BLD AUTO: 281 K/UL (ref 138–453)
PMV BLD AUTO: 10.1 FL (ref 8.1–13.5)
POTASSIUM SERPL-SCNC: 3.4 MMOL/L (ref 3.7–5.3)
PROT SERPL-MCNC: 7.7 G/DL (ref 6.6–8.7)
RBC # BLD AUTO: 4.61 M/UL (ref 3.95–5.11)
SODIUM SERPL-SCNC: 141 MMOL/L (ref 136–145)
VIT B12 SERPL-MCNC: 380 PG/ML (ref 232–1245)
WBC OTHER # BLD: 10.3 K/UL (ref 3.5–11.3)

## 2024-09-13 PROCEDURE — 80053 COMPREHEN METABOLIC PANEL: CPT

## 2024-09-13 PROCEDURE — 82607 VITAMIN B-12: CPT

## 2024-09-13 PROCEDURE — 83735 ASSAY OF MAGNESIUM: CPT

## 2024-09-13 PROCEDURE — 36415 COLL VENOUS BLD VENIPUNCTURE: CPT

## 2024-09-13 PROCEDURE — 83540 ASSAY OF IRON: CPT

## 2024-09-13 PROCEDURE — 85027 COMPLETE CBC AUTOMATED: CPT

## 2024-09-19 ENCOUNTER — HOSPITAL ENCOUNTER (OUTPATIENT)
Dept: CT IMAGING | Age: 39
Discharge: HOME | End: 2024-09-21
Payer: COMMERCIAL

## 2024-09-19 DIAGNOSIS — R31.9 HEMATURIA, UNSPECIFIED TYPE: ICD-10-CM

## 2024-09-19 DIAGNOSIS — N20.0 RENAL CALCULI: ICD-10-CM

## 2024-09-19 PROCEDURE — 74176 CT ABD & PELVIS W/O CONTRAST: CPT

## 2024-10-01 ENCOUNTER — HOSPITAL ENCOUNTER (OUTPATIENT)
Dept: ULTRASOUND IMAGING | Age: 39
Discharge: HOME | End: 2024-10-03
Payer: COMMERCIAL

## 2024-10-01 DIAGNOSIS — N83.201 BILATERAL OVARIAN CYSTS: ICD-10-CM

## 2024-10-01 DIAGNOSIS — N83.202 BILATERAL OVARIAN CYSTS: ICD-10-CM

## 2024-10-01 PROCEDURE — 76856 US EXAM PELVIC COMPLETE: CPT

## 2024-10-09 ENCOUNTER — OFFICE VISIT (OUTPATIENT)
Dept: ORTHOPEDIC SURGERY | Age: 39
End: 2024-10-09
Payer: COMMERCIAL

## 2024-10-09 VITALS — WEIGHT: 187 LBS | HEIGHT: 63 IN | BODY MASS INDEX: 33.13 KG/M2

## 2024-10-09 DIAGNOSIS — M72.2 PLANTAR FASCIITIS OF LEFT FOOT: Primary | ICD-10-CM

## 2024-10-09 PROCEDURE — 99213 OFFICE O/P EST LOW 20 MIN: CPT | Performed by: ORTHOPAEDIC SURGERY

## 2024-10-09 PROCEDURE — G8484 FLU IMMUNIZE NO ADMIN: HCPCS | Performed by: ORTHOPAEDIC SURGERY

## 2024-10-09 PROCEDURE — G8417 CALC BMI ABV UP PARAM F/U: HCPCS | Performed by: ORTHOPAEDIC SURGERY

## 2024-10-09 PROCEDURE — G8427 DOCREV CUR MEDS BY ELIG CLIN: HCPCS | Performed by: ORTHOPAEDIC SURGERY

## 2024-10-09 PROCEDURE — 1036F TOBACCO NON-USER: CPT | Performed by: ORTHOPAEDIC SURGERY

## 2024-10-09 NOTE — PROGRESS NOTES
[facility] PHYSICIANS [facility] ORTHO SPECIALISTS  [address]  [place] [address]  Dept: [phone]  Dept Fax: [phone]        Orthopaedic Clinic Follow Up      Subjective:   More Goldman is a 39 y.o. year old female who presents to the clinic today for routine follow up of left foot pain. She was last seen on 6/13/24 and diagnosed with left foot plantar fasciitis, and given a corticosteroid injection. She reports that she is doing significantly better.  Patient denies any start up pain.  She does still have some occasional numbness/tingling in the medial and lateral plantar nerve distribution with standing and walking for an extended period of time.  She has been wearing her shoe inserts.  She has been doing physical therapy exercises at home.  She is using minimal over-the-counter pain medications: Tylenol/ibuprofen.    Review of Systems  Gen: no fever, chills, malaise  CV: no chest pain or palpitations  Resp: no cough or shortness of breath  GI: no nausea, vomiting, diarrhea, or constipation  Neuro: no seizures, vertigo, or headache  Msk: See HPI  10 remaining systems reviewed and negative    Objective :   There were no vitals filed for this visit.Body mass index is 33.13 kg/m².  General: No acute distress, resting comfortably in the clinic  Neuro: alert. oriented  Eyes: Extra-ocular muscles intact  Pulm: Respirations unlabored and regular.  Skin: warm, well perfused  Psych:   Patient has good fund of knowledge and displays understanding of exam, diagnosis, and plan.  MSK: Left foot: Patient is mildly tender at the medial aspect of the calcaneus.  She does not have too many toe signs.  She is able to do single-leg heel raise.  Upon inspection, the hindfoot is in valgus compared to the contralateral ankle.  Medial arch integrity is maintained and equal to the contralateral extremity.  She has no pain along the posterior tibialis tendon.  Tinel's sign is

## 2024-10-30 NOTE — PROGRESS NOTES
I performed a history and physical examination of the patient and discussed management with the resident. I reviewed the /resident physician note and agree with the documented findings and plan of care. Any areas of disagreement are noted on the chart.   I have personally evaluated this patient and have completed at least one if not all key elements of the E/M (history, physical exam,procedure and MDM).  Additional findings are as noted. I agree with the chief complaint, past medical history, past surgical history, allergies, medications, social and family history as documented unless otherwise noted below.     Electronically signed by AKSHAT CERVANTES DO on 10/30/2024 at 8:07 AM

## 2024-11-18 ENCOUNTER — HOSPITAL ENCOUNTER (OUTPATIENT)
Age: 39
Discharge: HOME | End: 2024-11-18
Payer: COMMERCIAL

## 2024-11-18 LAB
25(OH)D3 SERPL-MCNC: 41.1 NG/ML (ref 30–100)
ALBUMIN SERPL-MCNC: 4.3 G/DL (ref 3.5–5.2)
ALBUMIN/GLOB SERPL: 1.5 {RATIO} (ref 1–2.5)
ALP SERPL-CCNC: 77 U/L (ref 35–104)
ALT SERPL-CCNC: 21 U/L (ref 10–35)
ANION GAP SERPL CALCULATED.3IONS-SCNC: 10 MMOL/L (ref 9–16)
AST SERPL-CCNC: 19 U/L (ref 10–35)
BILIRUB SERPL-MCNC: 0.9 MG/DL (ref 0–1.2)
BUN SERPL-MCNC: 11 MG/DL (ref 6–20)
CALCIUM SERPL-MCNC: 9.4 MG/DL (ref 8.6–10.4)
CHLORIDE SERPL-SCNC: 106 MMOL/L (ref 98–107)
CHOLEST SERPL-MCNC: 199 MG/DL (ref 0–199)
CHOLESTEROL/HDL RATIO: 4
CO2 SERPL-SCNC: 25 MMOL/L (ref 20–31)
CREAT SERPL-MCNC: 0.8 MG/DL (ref 0.6–0.9)
ERYTHROCYTE [DISTWIDTH] IN BLOOD BY AUTOMATED COUNT: 13.2 % (ref 11.8–14.4)
EST. AVERAGE GLUCOSE BLD GHB EST-MCNC: 85 MG/DL
GFR, ESTIMATED: >90 ML/MIN/1.73M2
GLUCOSE SERPL-MCNC: 90 MG/DL (ref 74–99)
HBA1C MFR BLD: 4.6 % (ref 4–6)
HCT VFR BLD AUTO: 43.4 % (ref 36.3–47.1)
HDLC SERPL-MCNC: 50 MG/DL
HGB BLD-MCNC: 14.8 G/DL (ref 11.9–15.1)
IRON SERPL-MCNC: 182 UG/DL (ref 37–145)
LDLC SERPL CALC-MCNC: 101 MG/DL (ref 0–100)
MAGNESIUM SERPL-MCNC: 1.9 MG/DL (ref 1.6–2.6)
MCH RBC QN AUTO: 32.5 PG (ref 25.2–33.5)
MCHC RBC AUTO-ENTMCNC: 34.1 G/DL (ref 28.4–34.8)
MCV RBC AUTO: 95.4 FL (ref 82.6–102.9)
NRBC BLD-RTO: 0 PER 100 WBC
PLATELET # BLD AUTO: 223 K/UL (ref 138–453)
PMV BLD AUTO: 11.3 FL (ref 8.1–13.5)
POTASSIUM SERPL-SCNC: 3.5 MMOL/L (ref 3.7–5.3)
PROT SERPL-MCNC: 7.2 G/DL (ref 6.6–8.7)
RBC # BLD AUTO: 4.55 M/UL (ref 3.95–5.11)
SODIUM SERPL-SCNC: 141 MMOL/L (ref 136–145)
TRIGL SERPL-MCNC: 240 MG/DL (ref 0–149)
VIT B12 SERPL-MCNC: 331 PG/ML (ref 232–1245)
VLDLC SERPL CALC-MCNC: 48 MG/DL (ref 1–30)
WBC OTHER # BLD: 9.8 K/UL (ref 3.5–11.3)

## 2024-11-18 PROCEDURE — 80061 LIPID PANEL: CPT

## 2024-11-18 PROCEDURE — 82306 VITAMIN D 25 HYDROXY: CPT

## 2024-11-18 PROCEDURE — 80053 COMPREHEN METABOLIC PANEL: CPT

## 2024-11-18 PROCEDURE — 36415 COLL VENOUS BLD VENIPUNCTURE: CPT

## 2024-11-18 PROCEDURE — 82607 VITAMIN B-12: CPT

## 2024-11-18 PROCEDURE — 85027 COMPLETE CBC AUTOMATED: CPT

## 2024-11-18 PROCEDURE — 83540 ASSAY OF IRON: CPT

## 2024-11-18 PROCEDURE — 83735 ASSAY OF MAGNESIUM: CPT

## 2024-11-18 PROCEDURE — 83036 HEMOGLOBIN GLYCOSYLATED A1C: CPT

## 2024-12-27 ENCOUNTER — HOSPITAL ENCOUNTER (OUTPATIENT)
Age: 39
Discharge: HOME | End: 2024-12-29
Payer: COMMERCIAL

## 2024-12-27 ENCOUNTER — HOSPITAL ENCOUNTER (OUTPATIENT)
Dept: GENERAL RADIOLOGY | Age: 39
Discharge: HOME | End: 2024-12-29
Payer: COMMERCIAL

## 2024-12-27 DIAGNOSIS — R05.9 COUGH IN ADULT: ICD-10-CM

## 2024-12-27 PROCEDURE — 71046 X-RAY EXAM CHEST 2 VIEWS: CPT

## 2025-01-07 ENCOUNTER — OFFICE VISIT (OUTPATIENT)
Dept: OBGYN CLINIC | Age: 40
End: 2025-01-07
Payer: COMMERCIAL

## 2025-01-07 VITALS
BODY MASS INDEX: 30.8 KG/M2 | HEIGHT: 64 IN | SYSTOLIC BLOOD PRESSURE: 148 MMHG | DIASTOLIC BLOOD PRESSURE: 80 MMHG | WEIGHT: 180.4 LBS

## 2025-01-07 DIAGNOSIS — Z98.890 HISTORY OF EPISIOTOMY: ICD-10-CM

## 2025-01-07 DIAGNOSIS — Z12.4 CERVICAL CANCER SCREENING: ICD-10-CM

## 2025-01-07 DIAGNOSIS — D25.9 UTERINE LEIOMYOMA, UNSPECIFIED LOCATION: ICD-10-CM

## 2025-01-07 DIAGNOSIS — N94.6 DYSMENORRHEA: ICD-10-CM

## 2025-01-07 DIAGNOSIS — Z11.3 ROUTINE SCREENING FOR STI (SEXUALLY TRANSMITTED INFECTION): ICD-10-CM

## 2025-01-07 DIAGNOSIS — N93.9 ABNORMAL UTERINE BLEEDING (AUB): Primary | ICD-10-CM

## 2025-01-07 PROCEDURE — G0101 CA SCREEN;PELVIC/BREAST EXAM: HCPCS | Performed by: ADVANCED PRACTICE MIDWIFE

## 2025-01-07 PROCEDURE — M1308 PR FLU IMMUNIZE NO ADMIN: HCPCS | Performed by: ADVANCED PRACTICE MIDWIFE

## 2025-01-07 RX ORDER — ERGOCALCIFEROL 1.25 MG/1
CAPSULE, LIQUID FILLED ORAL
COMMUNITY
Start: 2024-11-03 | End: 2025-01-07

## 2025-01-07 RX ORDER — POTASSIUM CHLORIDE 750 MG/1
10 TABLET, EXTENDED RELEASE ORAL DAILY
COMMUNITY

## 2025-01-07 RX ORDER — CYANOCOBALAMIN 1000 UG/ML
1000 INJECTION, SOLUTION INTRAMUSCULAR; SUBCUTANEOUS
COMMUNITY

## 2025-01-07 RX ORDER — CLINDAMYCIN PHOSPHATE 10 MG/G
GEL TOPICAL
COMMUNITY
Start: 2024-10-26

## 2025-01-07 ASSESSMENT — ANXIETY QUESTIONNAIRES
6. BECOMING EASILY ANNOYED OR IRRITABLE: MORE THAN HALF THE DAYS
GAD7 TOTAL SCORE: 11
1. FEELING NERVOUS, ANXIOUS, OR ON EDGE: SEVERAL DAYS
IF YOU CHECKED OFF ANY PROBLEMS ON THIS QUESTIONNAIRE, HOW DIFFICULT HAVE THESE PROBLEMS MADE IT FOR YOU TO DO YOUR WORK, TAKE CARE OF THINGS AT HOME, OR GET ALONG WITH OTHER PEOPLE: NOT DIFFICULT AT ALL
7. FEELING AFRAID AS IF SOMETHING AWFUL MIGHT HAPPEN: NOT AT ALL
2. NOT BEING ABLE TO STOP OR CONTROL WORRYING: SEVERAL DAYS
3. WORRYING TOO MUCH ABOUT DIFFERENT THINGS: MORE THAN HALF THE DAYS
5. BEING SO RESTLESS THAT IT IS HARD TO SIT STILL: MORE THAN HALF THE DAYS
4. TROUBLE RELAXING: NEARLY EVERY DAY

## 2025-01-07 ASSESSMENT — PATIENT HEALTH QUESTIONNAIRE - PHQ9
SUM OF ALL RESPONSES TO PHQ QUESTIONS 1-9: 0
SUM OF ALL RESPONSES TO PHQ9 QUESTIONS 1 & 2: 0
2. FEELING DOWN, DEPRESSED OR HOPELESS: NOT AT ALL
SUM OF ALL RESPONSES TO PHQ QUESTIONS 1-9: 0
SUM OF ALL RESPONSES TO PHQ QUESTIONS 1-9: 0
1. LITTLE INTEREST OR PLEASURE IN DOING THINGS: NOT AT ALL
SUM OF ALL RESPONSES TO PHQ QUESTIONS 1-9: 0

## 2025-01-07 NOTE — PROGRESS NOTES
Patient here as New patient/Annual   Last pap 10/24/2019 normal + chlamydia  
Patient here for annual exam and and states she has questions about a recent usn and states she she passes liquid stool sometimes through her vagina. Patient also states she had been having irregular cycles that are painful.  Last pap 10/24/2019 normal (+) chlamydia   
maintenance per patients PCP.      Counseling Completed:  [x] Prevent Health Recommendations    Discussed findings of nor rectal defect and possible vaginal infection/ STI due to discharge   Discussed medical/ surgical options for fibroid management. Pt declined medical management and desires surgical management.        Age 18 and > [facility] Woman [facility]  General Health:  [] Alcohol screening & counseling  [x] Blood pressure screening: mildly elevated  [] Contraceptive counseling & methods: BTL  [x] Depression Screening: Negative  [] Diabetes Screening:    [] Tobacco & Secondary smoke risks reviewed; instructed on cessation and avoidance  [] Folic acid supplementation  [] Healthful diet & activity counseling:    [] Interpersonal violence screening: Negative  [] Lipid screening:   [x] Obesity Screening: Body mass index is 30.97 kg/m².  [] Osteoporosis screening  [] Substance use screening & counseling  [] Tobacco screening & counseling  [x] Urinary incontinence screening  [] Colon cancer screening  [] Hereditary Breast, Ovarian, Colon and Uterine Cancer screening      Infectious Diseases:  [x] Gonorrhea & chlamydia screening:   [] Hepatitis C Screening   [x] HIV risk assessment/ testing (at least once in lifetime):    [] Immunizations:   [x] STI prevention counseling    Cancer:  [x] Cervical cancer screening:   Reviewed that estimates suggest that 50% of the women with cervical cancer is diagnosed never had cervical cytology testing, and another 10% had not been screened within 5 years before diagnosis.  Reviewed only a small fraction of women infected with high-risk (human papilloma virus) HPV will develop significant cervical abnormalities and cancer.  Reviewed HPV-16 has the highest carcinogenic potential accounting for approximately 50-60% of all cases of cervical cancer worldwide and HPV-18 is the next most associated with cervical cancer and is responsible for 10-15% of cases of cervical cancer.  HPV infection is

## 2025-01-25 SDOH — ECONOMIC STABILITY: FOOD INSECURITY: WITHIN THE PAST 12 MONTHS, YOU WORRIED THAT YOUR FOOD WOULD RUN OUT BEFORE YOU GOT MONEY TO BUY MORE.: OFTEN TRUE

## 2025-01-25 SDOH — ECONOMIC STABILITY: INCOME INSECURITY: IN THE LAST 12 MONTHS, WAS THERE A TIME WHEN YOU WERE NOT ABLE TO PAY THE MORTGAGE OR RENT ON TIME?: NO

## 2025-01-25 SDOH — ECONOMIC STABILITY: FOOD INSECURITY: WITHIN THE PAST 12 MONTHS, THE FOOD YOU BOUGHT JUST DIDN'T LAST AND YOU DIDN'T HAVE MONEY TO GET MORE.: OFTEN TRUE

## 2025-01-25 SDOH — ECONOMIC STABILITY: TRANSPORTATION INSECURITY
IN THE PAST 12 MONTHS, HAS THE LACK OF TRANSPORTATION KEPT YOU FROM MEDICAL APPOINTMENTS OR FROM GETTING MEDICATIONS?: YES

## 2025-01-25 SDOH — ECONOMIC STABILITY: TRANSPORTATION INSECURITY
IN THE PAST 12 MONTHS, HAS LACK OF TRANSPORTATION KEPT YOU FROM MEETINGS, WORK, OR FROM GETTING THINGS NEEDED FOR DAILY LIVING?: YES

## 2025-01-28 ENCOUNTER — OFFICE VISIT (OUTPATIENT)
Age: 40
End: 2025-01-28
Payer: COMMERCIAL

## 2025-01-28 VITALS
BODY MASS INDEX: 32.36 KG/M2 | HEIGHT: 63 IN | SYSTOLIC BLOOD PRESSURE: 138 MMHG | DIASTOLIC BLOOD PRESSURE: 92 MMHG | WEIGHT: 182.6 LBS

## 2025-01-28 DIAGNOSIS — Z87.59 HISTORY OF FOURTH DEGREE PERINEAL LACERATION: ICD-10-CM

## 2025-01-28 DIAGNOSIS — N83.201 RIGHT OVARIAN CYST: ICD-10-CM

## 2025-01-28 DIAGNOSIS — K50.018 CROHN'S DISEASE OF SMALL INTESTINE WITH OTHER COMPLICATION (HCC): ICD-10-CM

## 2025-01-28 DIAGNOSIS — N93.9 ABNORMAL UTERINE BLEEDING (AUB): ICD-10-CM

## 2025-01-28 DIAGNOSIS — Z90.49 HISTORY OF RIGHT HEMICOLECTOMY: ICD-10-CM

## 2025-01-28 DIAGNOSIS — Z90.79 HISTORY OF BILATERAL SALPINGECTOMY: ICD-10-CM

## 2025-01-28 DIAGNOSIS — Z90.721 HISTORY OF LEFT OOPHORECTOMY: ICD-10-CM

## 2025-01-28 DIAGNOSIS — R10.2 PELVIC PAIN: Primary | ICD-10-CM

## 2025-01-28 DIAGNOSIS — Z98.890 HISTORY OF LOOP ELECTRICAL EXCISION PROCEDURE (LEEP): ICD-10-CM

## 2025-01-28 DIAGNOSIS — N89.8 VAGINAL DISCHARGE: ICD-10-CM

## 2025-01-28 DIAGNOSIS — D25.9 UTERINE LEIOMYOMA, UNSPECIFIED LOCATION: ICD-10-CM

## 2025-01-28 PROCEDURE — 1036F TOBACCO NON-USER: CPT | Performed by: STUDENT IN AN ORGANIZED HEALTH CARE EDUCATION/TRAINING PROGRAM

## 2025-01-28 PROCEDURE — G8427 DOCREV CUR MEDS BY ELIG CLIN: HCPCS | Performed by: STUDENT IN AN ORGANIZED HEALTH CARE EDUCATION/TRAINING PROGRAM

## 2025-01-28 PROCEDURE — G8417 CALC BMI ABV UP PARAM F/U: HCPCS | Performed by: STUDENT IN AN ORGANIZED HEALTH CARE EDUCATION/TRAINING PROGRAM

## 2025-01-28 PROCEDURE — 99213 OFFICE O/P EST LOW 20 MIN: CPT | Performed by: STUDENT IN AN ORGANIZED HEALTH CARE EDUCATION/TRAINING PROGRAM

## 2025-01-28 RX ORDER — ERGOCALCIFEROL 1.25 MG/1
CAPSULE, LIQUID FILLED ORAL
COMMUNITY
Start: 2025-01-25

## 2025-01-28 NOTE — PROGRESS NOTES
OB/GYN Consultation  [facility]     More Goldman  2024                       Primary Care Physician: Kristina Ornelas PA-C    CC:   Chief Complaint   Patient presents with    New Patient         HPI: More Goldman is a 39 y.o. female  here for consultation with regards to chronic pelvic pain and discussion with regards to surgical intervention.    The patient reports a long history of heavy painful menstrual cycles. She also has chronic abdominal and pelvic pain with a known history of Crohns disease and has previously underwent hemicolectomy. The patient also has a significant surgical history of cholecystectomy and left oophorectomy for large ovarian cyst. Bilateral salpingectomy was performed at the time of that oophorectomy by Gyn Oncology. This surgery occurred in , and at that time, general surgery was also present given her significant surgical history and concern for significant pelvic adhesions. Op reports reviewed, mild pelvic adhesions were noted between omentum, right ovary, and right pelvic side wall. No obvious adhesions to anterior abdominal wall or uterus were noted at that time.    The patient does also complain of diarrhea symptoms. She is concerned because for about the past 3-4 months, even if she didn't have a bowel movement, she will wipe and notice some foul discharge. She was worried she could be developing a fistula. The patient does have hypertension and is not a candidate for estrogen. We discussed hormonal management options and patient has no interest in any kind of LARC or hormone at this time.    Most recent ultrasound was reviewed. It revealed a small right ovarian cyst and a 9cm uterus with multiple fibroids. Discussed at length that hysterectomy may not help her pelvic pain symptoms given potential other etiologies, however it will help her bleeding symptoms. Also discussed given Crohn's disease, risk of future fistula development. The patient

## 2025-01-30 PROBLEM — R87.610 ATYPICAL SQUAMOUS CELLS OF UNDETERMINED SIGNIFICANCE (ASCUS) ON PAPANICOLAOU SMEAR OF CERVIX: Status: RESOLVED | Noted: 2021-04-06 | Resolved: 2025-01-30

## 2025-02-12 ENCOUNTER — HOSPITAL ENCOUNTER (OUTPATIENT)
Age: 40
Discharge: HOME | End: 2025-02-12
Payer: COMMERCIAL

## 2025-02-12 LAB
25(OH)D3 SERPL-MCNC: 22.3 NG/ML (ref 30–100)
ALBUMIN SERPL-MCNC: 4.1 G/DL (ref 3.5–5.2)
ALBUMIN/GLOB SERPL: 1.5 {RATIO} (ref 1–2.5)
ALP SERPL-CCNC: 54 U/L (ref 35–104)
ALT SERPL-CCNC: 17 U/L (ref 10–35)
ANION GAP SERPL CALCULATED.3IONS-SCNC: 11 MMOL/L (ref 9–16)
AST SERPL-CCNC: 19 U/L (ref 10–35)
BASOPHILS # BLD: 0.06 K/UL (ref 0–0.2)
BASOPHILS NFR BLD: 1 % (ref 0–2)
BILIRUB SERPL-MCNC: 0.5 MG/DL (ref 0–1.2)
BUN SERPL-MCNC: 11 MG/DL (ref 6–20)
CALCIUM SERPL-MCNC: 9.4 MG/DL (ref 8.6–10.4)
CHLORIDE SERPL-SCNC: 105 MMOL/L (ref 98–107)
CHOLEST SERPL-MCNC: 202 MG/DL (ref 0–199)
CHOLESTEROL/HDL RATIO: 4
CO2 SERPL-SCNC: 24 MMOL/L (ref 20–31)
CREAT SERPL-MCNC: 0.8 MG/DL (ref 0.6–0.9)
EOSINOPHIL # BLD: 0.21 K/UL (ref 0–0.44)
EOSINOPHILS RELATIVE PERCENT: 3 % (ref 1–4)
ERYTHROCYTE [DISTWIDTH] IN BLOOD BY AUTOMATED COUNT: 13.3 % (ref 11.8–14.4)
EST. AVERAGE GLUCOSE BLD GHB EST-MCNC: 82 MG/DL
GFR, ESTIMATED: >90 ML/MIN/1.73M2
GLUCOSE SERPL-MCNC: 86 MG/DL (ref 74–99)
HBA1C MFR BLD: 4.5 % (ref 4–6)
HCT VFR BLD AUTO: 43.3 % (ref 36.3–47.1)
HDLC SERPL-MCNC: 51 MG/DL
HGB BLD-MCNC: 14 G/DL (ref 11.9–15.1)
IMM GRANULOCYTES # BLD AUTO: <0.03 K/UL (ref 0–0.3)
IMM GRANULOCYTES NFR BLD: 0 %
IRON SERPL-MCNC: 76 UG/DL (ref 37–145)
LDLC SERPL CALC-MCNC: 109 MG/DL (ref 0–100)
LYMPHOCYTES NFR BLD: 2.46 K/UL (ref 1.1–3.7)
LYMPHOCYTES RELATIVE PERCENT: 30 % (ref 24–43)
MAGNESIUM SERPL-MCNC: 2 MG/DL (ref 1.6–2.6)
MCH RBC QN AUTO: 31.5 PG (ref 25.2–33.5)
MCHC RBC AUTO-ENTMCNC: 32.3 G/DL (ref 28.4–34.8)
MCV RBC AUTO: 97.5 FL (ref 82.6–102.9)
MONOCYTES NFR BLD: 0.65 K/UL (ref 0.1–1.2)
MONOCYTES NFR BLD: 8 % (ref 3–12)
NEUTROPHILS NFR BLD: 58 % (ref 36–65)
NEUTS SEG NFR BLD: 4.72 K/UL (ref 1.5–8.1)
NRBC BLD-RTO: 0 PER 100 WBC
PLATELET # BLD AUTO: 220 K/UL (ref 138–453)
PMV BLD AUTO: 10.5 FL (ref 8.1–13.5)
POTASSIUM SERPL-SCNC: 4.1 MMOL/L (ref 3.7–5.3)
PROT SERPL-MCNC: 6.8 G/DL (ref 6.6–8.7)
RBC # BLD AUTO: 4.44 M/UL (ref 3.95–5.11)
SODIUM SERPL-SCNC: 140 MMOL/L (ref 136–145)
TRIGL SERPL-MCNC: 210 MG/DL
VIT B12 SERPL-MCNC: 360 PG/ML (ref 232–1245)
VLDLC SERPL CALC-MCNC: 42 MG/DL (ref 1–30)
WBC OTHER # BLD: 8.1 K/UL (ref 3.5–11.3)

## 2025-02-12 PROCEDURE — 83540 ASSAY OF IRON: CPT

## 2025-02-12 PROCEDURE — 80061 LIPID PANEL: CPT

## 2025-02-12 PROCEDURE — 80053 COMPREHEN METABOLIC PANEL: CPT

## 2025-02-12 PROCEDURE — 82607 VITAMIN B-12: CPT

## 2025-02-12 PROCEDURE — 83036 HEMOGLOBIN GLYCOSYLATED A1C: CPT

## 2025-02-12 PROCEDURE — 36415 COLL VENOUS BLD VENIPUNCTURE: CPT

## 2025-02-12 PROCEDURE — 82306 VITAMIN D 25 HYDROXY: CPT

## 2025-02-12 PROCEDURE — 85025 COMPLETE CBC W/AUTO DIFF WBC: CPT

## 2025-02-12 PROCEDURE — 83735 ASSAY OF MAGNESIUM: CPT

## 2025-02-20 ENCOUNTER — HOSPITAL ENCOUNTER (OUTPATIENT)
Dept: GENERAL RADIOLOGY | Age: 40
Discharge: HOME | End: 2025-02-22
Payer: COMMERCIAL

## 2025-02-20 ENCOUNTER — HOSPITAL ENCOUNTER (OUTPATIENT)
Age: 40
Discharge: HOME | End: 2025-02-22
Payer: COMMERCIAL

## 2025-02-20 DIAGNOSIS — M54.50 LUMBAR PAIN: ICD-10-CM

## 2025-02-20 PROCEDURE — 72220 X-RAY EXAM SACRUM TAILBONE: CPT

## 2025-02-20 PROCEDURE — 72100 X-RAY EXAM L-S SPINE 2/3 VWS: CPT

## 2025-03-11 ENCOUNTER — PROCEDURE VISIT (OUTPATIENT)
Age: 40
End: 2025-03-11

## 2025-03-11 ENCOUNTER — HOSPITAL ENCOUNTER (OUTPATIENT)
Age: 40
Setting detail: SPECIMEN
Discharge: HOME | End: 2025-03-11

## 2025-03-11 ENCOUNTER — TELEPHONE (OUTPATIENT)
Dept: OBGYN CLINIC | Age: 40
End: 2025-03-11

## 2025-03-11 VITALS
HEIGHT: 63 IN | DIASTOLIC BLOOD PRESSURE: 84 MMHG | WEIGHT: 180 LBS | BODY MASS INDEX: 31.89 KG/M2 | SYSTOLIC BLOOD PRESSURE: 126 MMHG

## 2025-03-11 DIAGNOSIS — R10.2 PELVIC PAIN: ICD-10-CM

## 2025-03-11 DIAGNOSIS — K50.018 CROHN'S DISEASE OF SMALL INTESTINE WITH OTHER COMPLICATION (HCC): ICD-10-CM

## 2025-03-11 DIAGNOSIS — Z90.721 HISTORY OF LEFT OOPHORECTOMY: ICD-10-CM

## 2025-03-11 DIAGNOSIS — D25.9 UTERINE LEIOMYOMA, UNSPECIFIED LOCATION: ICD-10-CM

## 2025-03-11 DIAGNOSIS — Z87.59 HISTORY OF FOURTH DEGREE PERINEAL LACERATION: ICD-10-CM

## 2025-03-11 DIAGNOSIS — Z90.49 HISTORY OF RIGHT HEMICOLECTOMY: ICD-10-CM

## 2025-03-11 DIAGNOSIS — N89.8 VAGINAL DISCHARGE: ICD-10-CM

## 2025-03-11 DIAGNOSIS — Z12.4 PAP SMEAR FOR CERVICAL CANCER SCREENING: ICD-10-CM

## 2025-03-11 DIAGNOSIS — N82.3 RECTOVAGINAL FISTULA: ICD-10-CM

## 2025-03-11 DIAGNOSIS — Z98.890 HISTORY OF EPISIOTOMY: ICD-10-CM

## 2025-03-11 DIAGNOSIS — Z90.79 HISTORY OF BILATERAL SALPINGECTOMY: ICD-10-CM

## 2025-03-11 DIAGNOSIS — N73.6 PELVIC ADHESIVE DISEASE: ICD-10-CM

## 2025-03-11 DIAGNOSIS — N93.9 ABNORMAL UTERINE BLEEDING (AUB): Primary | ICD-10-CM

## 2025-03-11 LAB
CONTROL: YES
PREGNANCY TEST URINE, POC: NEGATIVE

## 2025-03-11 NOTE — TELEPHONE ENCOUNTER
Marylin from [facility] called back regarding patients pap results from Cayla's message.   Marylin informed writer the pap was not completed due to [facility] did not sent over pap order sheet along with the other orders. Only GC was resulted. Writer asked if a order can be faxed over if they still have the sample. Lab is unable to complete since they no longer have sample. Patient will need to repeat pap.

## 2025-03-11 NOTE — PROGRESS NOTES
OB/GYN Procedure Note  [facility]     More Goldman  3/11/2024                       Primary Care Physician: Kristina Ornelas PA-C      Subjective:   More Goldman 39 y.o. female  is here for previously scheduled endometrial biopsy due to history of AUB, pelvic pain.  Patient's last menstrual period was 2025. She has known uterine fibroids and desires surgical intervention for her symptoms. The patient has a significant surgical history of right hemicolectomy for Crohn's disease which has been overall under better control with current medication regimen of Entyvio. The patient has previously had bilateral salpingectomy and left oophorectomy for large ovarian cyst by Gynecologic Oncology. The patient is hoping for definitive surgery for her pain and bleeding symptoms. She does not want to try anything hormonal.    The patient has also complained of experiencing gas from her vagina and occasionally concerned she is passing stool from her vagina. She follows with GI and reports she is due for a colonoscopy. She reports this has been happening since October.    Vitals:   Blood pressure 126/84, height 1.6 m (5' 3\"), weight 81.6 kg (180 lb), last menstrual period 2025, not currently breastfeeding.    Procedure: Pelvic Exam, Pap Smear, Endometrial Biopsy, Health Tracks Swab Collection    Indications: Vaginal Discharge, AUB, Pelvic Pain    Procedure Details:   Chaperone for Intimate Exam: Chaperone was offered and declined    The patient was counseled on the procedure. Risks, benefits and alternatives were reviewed. The patient is aware that this is diagnostic and not curative and a second procedure may be needed. A consent was reviewed and obtained.    The patient was positioned comfortably on the exam table. External exam revealed a small amount of yellow/brown stool at introitus. Tucked just inside posterior introitus near hymenal remnant was a small pinpoint opening to a tract.

## 2025-03-12 ENCOUNTER — RESULTS FOLLOW-UP (OUTPATIENT)
Age: 40
End: 2025-03-12

## 2025-03-12 RX ORDER — TRANEXAMIC ACID 650 MG/1
1300 TABLET ORAL 3 TIMES DAILY
Qty: 30 TABLET | Refills: 0 | Status: SHIPPED | OUTPATIENT
Start: 2025-03-12 | End: 2025-03-17

## 2025-03-13 ENCOUNTER — RESULTS FOLLOW-UP (OUTPATIENT)
Dept: LAB | Age: 40
End: 2025-03-13

## 2025-03-13 ENCOUNTER — RESULTS FOLLOW-UP (OUTPATIENT)
Dept: OBGYN CLINIC | Age: 40
End: 2025-03-13

## 2025-03-13 DIAGNOSIS — N76.0 ACUTE VAGINITIS: Primary | ICD-10-CM

## 2025-03-13 LAB
HPV I/H RISK 4 DNA CVX QL NAA+PROBE: NOT DETECTED
HPV SAMPLE: NORMAL
HPV, INTERPRETATION: NORMAL
HPV16 DNA CVX QL NAA+PROBE: NOT DETECTED
HPV18 DNA CVX QL NAA+PROBE: NOT DETECTED
SPECIMEN DESCRIPTION: NORMAL
SURGICAL PATHOLOGY REPORT: NORMAL

## 2025-03-13 RX ORDER — LEVOFLOXACIN 500 MG/1
500 TABLET, FILM COATED ORAL DAILY
Qty: 7 TABLET | Refills: 0 | Status: SHIPPED | OUTPATIENT
Start: 2025-03-13 | End: 2025-03-20

## 2025-03-13 RX ORDER — METRONIDAZOLE 500 MG/1
500 TABLET ORAL 2 TIMES DAILY
Qty: 14 TABLET | Refills: 0 | Status: SHIPPED | OUTPATIENT
Start: 2025-03-13 | End: 2025-03-20

## 2025-03-20 LAB — CYTOLOGY REPORT: NORMAL

## 2025-04-09 ENCOUNTER — OFFICE VISIT (OUTPATIENT)
Dept: SURGERY | Age: 40
End: 2025-04-09
Payer: COMMERCIAL

## 2025-04-09 VITALS
SYSTOLIC BLOOD PRESSURE: 124 MMHG | HEIGHT: 63 IN | OXYGEN SATURATION: 95 % | DIASTOLIC BLOOD PRESSURE: 82 MMHG | HEART RATE: 74 BPM | TEMPERATURE: 97.3 F | WEIGHT: 179 LBS | BODY MASS INDEX: 31.71 KG/M2

## 2025-04-09 DIAGNOSIS — L98.8 FISTULA: ICD-10-CM

## 2025-04-09 DIAGNOSIS — N82.3 RECTOVAGINAL FISTULA: Primary | ICD-10-CM

## 2025-04-09 PROCEDURE — 46600 DIAGNOSTIC ANOSCOPY SPX: CPT | Performed by: COLON & RECTAL SURGERY

## 2025-04-09 PROCEDURE — G8427 DOCREV CUR MEDS BY ELIG CLIN: HCPCS | Performed by: COLON & RECTAL SURGERY

## 2025-04-09 PROCEDURE — 99203 OFFICE O/P NEW LOW 30 MIN: CPT | Performed by: COLON & RECTAL SURGERY

## 2025-04-09 PROCEDURE — G8417 CALC BMI ABV UP PARAM F/U: HCPCS | Performed by: COLON & RECTAL SURGERY

## 2025-04-09 PROCEDURE — 1036F TOBACCO NON-USER: CPT | Performed by: COLON & RECTAL SURGERY

## 2025-04-09 ASSESSMENT — ENCOUNTER SYMPTOMS: DIARRHEA: 1

## 2025-04-09 NOTE — PROGRESS NOTES
[facility] PHYSICIANS [facility] SURGICAL SPECIALISTS  [address] [id number]  Dept: [phone]    Patient:  More Goldman  YOB: 1985  Date: 4/9/2025     The patient is a 39 y.o. female who presents today for consult of the following problems:     Chief Complaint: New Patient (Vaginal discharge- Rectovaginal fistula)       HPI:     More Goldman a 39 y.o. female is here for a new patient consult to discuss rectovaginal fistula. Since October she reports having gas and stool pass through her vagina. She has a history of a colectomy in May 2021 due to Crohn's disease.   She reports runny discharge that is yellowish in color.   She denies   swelling, itching, constipation,  rectal bleeding when wiping, rectal pain, blood in stool, abdominal cramping, feeling pressure at the rectum, feeling any lumps with wiping.    She describes BM 4-5x daily without straining.    HPI  Name of surgery: colectomy- Dr. Bunn  Surgery date: 05/18/2021   CT - 9/19/2024  Colonoscopy: 5/28/24 ([facility])    History:     Past Medical History:   Diagnosis Date    Abnormal Pap smear of cervix     ASCUS with positive high risk HPV 2013    Crohn's disease (HCC)     Elevated LFTs     Fracture     Nose fracture no surgery    Headache     Hearing loss     Hypoglycemia     Ovarian cyst     Left    Personal history of other medical treatment     OVA1 elevated markers    Wellness examination 05/10/2021    PCP: Myra Petersen PA-C, last visit 4/29/2021    Wellness examination 05/10/2021    GI: Ema Reddy, [facility], last visit 5/3/2021    Wellness examination 05/10/2021    Dr. Bynum, [facility], 4/2021    Wellness examination 05/10/2021    CNM: Vilma Hernandez, [facility], last visit 2021     Past Surgical History:   Procedure Laterality Date    CHOLECYSTECTOMY, LAPAROSCOPIC N/A 09/30/2019    XI LAPAROSCOPIC ROBOTIC CHOLECYSTECTOMY, FIREFLY

## 2025-04-10 DIAGNOSIS — L98.8 FISTULA: Primary | ICD-10-CM

## 2025-04-21 ENCOUNTER — HOSPITAL ENCOUNTER (OUTPATIENT)
Dept: MRI IMAGING | Age: 40
Discharge: HOME | End: 2025-04-23
Attending: COLON & RECTAL SURGERY
Payer: COMMERCIAL

## 2025-04-21 DIAGNOSIS — N82.3 RECTOVAGINAL FISTULA: ICD-10-CM

## 2025-04-21 DIAGNOSIS — L98.8 FISTULA: ICD-10-CM

## 2025-04-21 PROCEDURE — 6360000004 HC RX CONTRAST MEDICATION: Performed by: COLON & RECTAL SURGERY

## 2025-04-21 PROCEDURE — 2500000003 HC RX 250 WO HCPCS: Performed by: COLON & RECTAL SURGERY

## 2025-04-21 PROCEDURE — 72197 MRI PELVIS W/O & W/DYE: CPT

## 2025-04-21 PROCEDURE — A9576 INJ PROHANCE MULTIPACK: HCPCS | Performed by: COLON & RECTAL SURGERY

## 2025-04-21 RX ORDER — SODIUM CHLORIDE 0.9 % (FLUSH) 0.9 %
30 SYRINGE (ML) INJECTION PRN
Status: DISCONTINUED | OUTPATIENT
Start: 2025-04-21 | End: 2025-04-24 | Stop reason: HOSPADM

## 2025-04-21 RX ADMIN — SODIUM CHLORIDE, PRESERVATIVE FREE 30 ML: 5 INJECTION INTRAVENOUS at 09:12

## 2025-04-21 RX ADMIN — GADOTERIDOL 16 ML: 279.3 INJECTION, SOLUTION INTRAVENOUS at 09:12

## 2025-04-23 ENCOUNTER — RESULTS FOLLOW-UP (OUTPATIENT)
Dept: SURGERY | Age: 40
End: 2025-04-23

## 2025-05-12 NOTE — PROGRESS NOTES
[facility] UROGYNECOLOGY AND PELVIC REHABILITATION   [address] [place] [address]  Dept: [phone]  Date: 2025  Patient Name: More Goldman    VISIT - NEW PATIENT VISIT     CC: had concerns including New Patient (Pt here for rectovaginal fistula, ).    Chaperone present for entire visit and pertinent physical exam: Resident    HPI: More Goldman 39 y.o. female  is here for previously scheduled endometrial biopsy due to history of AUB, pelvic pain.  Patient's last menstrual period was 2025. She has known uterine fibroids and desires surgical intervention for her symptoms. The patient has a significant surgical history of right hemicolectomy for Crohn's disease which has been overall under better control with current medication regimen of Entyvio. The patient has previously had bilateral salpingectomy and left oophorectomy for large ovarian cyst by Gynecologic Oncology. The patient is hoping for definitive surgery for her pain and bleeding symptoms. She does not want to try anything hormonal.     The patient has also complained of experiencing gas from her vagina and occasionally concerned she is passing stool from her vagina. She follows with GI and reports she is due for a colonoscopy. She reports this has been happening since October.    Per Dr. DESIREE Voss's last visit recommendations:     - Suspected rectovaginal fistula noted on exam along posterior introitus              - Pt has history of episiotomy with 4th degree perineal laceration and Crohns disease              - Pt reports symptoms since October              - Follows with GI, due for colonoscopy              - Referred to colorectal surgery              - Would not recommend hysterectomy with active fistula out of concern for further fistula formation              - Discussed medical management options for her pelvic pain and heavy, painful

## 2025-05-13 ENCOUNTER — OFFICE VISIT (OUTPATIENT)
Age: 40
End: 2025-05-13
Payer: COMMERCIAL

## 2025-05-13 VITALS
BODY MASS INDEX: 31.89 KG/M2 | OXYGEN SATURATION: 94 % | HEART RATE: 74 BPM | SYSTOLIC BLOOD PRESSURE: 145 MMHG | DIASTOLIC BLOOD PRESSURE: 98 MMHG | HEIGHT: 63 IN | WEIGHT: 180 LBS

## 2025-05-13 DIAGNOSIS — K50.013 CROHN'S DISEASE OF SMALL INTESTINE WITH FISTULA (HCC): ICD-10-CM

## 2025-05-13 DIAGNOSIS — N94.6 DYSMENORRHEA: ICD-10-CM

## 2025-05-13 DIAGNOSIS — D21.9 FIBROIDS: Primary | ICD-10-CM

## 2025-05-13 DIAGNOSIS — N92.1 MENOMETRORRHAGIA: ICD-10-CM

## 2025-05-13 DIAGNOSIS — R14.0 ABDOMINAL BLOATING: ICD-10-CM

## 2025-05-13 PROCEDURE — 1036F TOBACCO NON-USER: CPT | Performed by: OBSTETRICS & GYNECOLOGY

## 2025-05-13 PROCEDURE — 99204 OFFICE O/P NEW MOD 45 MIN: CPT | Performed by: OBSTETRICS & GYNECOLOGY

## 2025-05-13 PROCEDURE — G8417 CALC BMI ABV UP PARAM F/U: HCPCS | Performed by: OBSTETRICS & GYNECOLOGY

## 2025-05-13 PROCEDURE — G8427 DOCREV CUR MEDS BY ELIG CLIN: HCPCS | Performed by: OBSTETRICS & GYNECOLOGY

## 2025-05-13 RX ORDER — ERGOCALCIFEROL 1.25 MG/1
CAPSULE, LIQUID FILLED ORAL
COMMUNITY
Start: 2025-05-09

## 2025-05-13 RX ORDER — OMEPRAZOLE 40 MG/1
CAPSULE, DELAYED RELEASE ORAL
COMMUNITY
Start: 2025-05-09

## 2025-05-16 ENCOUNTER — HOSPITAL ENCOUNTER (OUTPATIENT)
Age: 40
Discharge: HOME | End: 2025-05-16
Payer: COMMERCIAL

## 2025-05-16 LAB
25(OH)D3 SERPL-MCNC: 28.8 NG/ML (ref 30–100)
ALBUMIN SERPL-MCNC: 3.9 G/DL (ref 3.5–5.2)
ALBUMIN/GLOB SERPL: 1.3 {RATIO} (ref 1–2.5)
ALP SERPL-CCNC: 79 U/L (ref 35–104)
ALT SERPL-CCNC: 35 U/L (ref 10–35)
ANION GAP SERPL CALCULATED.3IONS-SCNC: 10 MMOL/L (ref 9–16)
AST SERPL-CCNC: 30 U/L (ref 10–35)
BILIRUB SERPL-MCNC: 0.7 MG/DL (ref 0–1.2)
BUN SERPL-MCNC: 8 MG/DL (ref 6–20)
CALCIUM SERPL-MCNC: 9 MG/DL (ref 8.6–10.4)
CHLORIDE SERPL-SCNC: 105 MMOL/L (ref 98–107)
CHOLEST SERPL-MCNC: 204 MG/DL (ref 0–199)
CHOLESTEROL/HDL RATIO: 4.1
CO2 SERPL-SCNC: 27 MMOL/L (ref 20–31)
CREAT SERPL-MCNC: 0.7 MG/DL (ref 0.6–0.9)
ERYTHROCYTE [DISTWIDTH] IN BLOOD BY AUTOMATED COUNT: 13.1 % (ref 11.8–14.4)
EST. AVERAGE GLUCOSE BLD GHB EST-MCNC: 85 MG/DL
GFR, ESTIMATED: >90 ML/MIN/1.73M2
GLUCOSE SERPL-MCNC: 94 MG/DL (ref 74–99)
HBA1C MFR BLD: 4.6 % (ref 4–6)
HCT VFR BLD AUTO: 40 % (ref 36.3–47.1)
HDLC SERPL-MCNC: 50 MG/DL
HGB BLD-MCNC: 13.5 G/DL (ref 11.9–15.1)
IRON SERPL-MCNC: 62 UG/DL (ref 37–145)
LDLC SERPL CALC-MCNC: 111 MG/DL (ref 0–100)
MAGNESIUM SERPL-MCNC: 1.8 MG/DL (ref 1.6–2.6)
MCH RBC QN AUTO: 31.4 PG (ref 25.2–33.5)
MCHC RBC AUTO-ENTMCNC: 33.8 G/DL (ref 28.4–34.8)
MCV RBC AUTO: 93 FL (ref 82.6–102.9)
NRBC BLD-RTO: 0 PER 100 WBC
PLATELET # BLD AUTO: 273 K/UL (ref 138–453)
PMV BLD AUTO: 10.3 FL (ref 8.1–13.5)
POTASSIUM SERPL-SCNC: 3.1 MMOL/L (ref 3.7–5.3)
PROT SERPL-MCNC: 6.8 G/DL (ref 6.6–8.7)
RBC # BLD AUTO: 4.3 M/UL (ref 3.95–5.11)
SODIUM SERPL-SCNC: 142 MMOL/L (ref 136–145)
TRIGL SERPL-MCNC: 216 MG/DL
VIT B12 SERPL-MCNC: 413 PG/ML (ref 232–1245)
VLDLC SERPL CALC-MCNC: 43 MG/DL (ref 1–30)
WBC OTHER # BLD: 7.6 K/UL (ref 3.5–11.3)

## 2025-05-16 PROCEDURE — 82607 VITAMIN B-12: CPT

## 2025-05-16 PROCEDURE — 83540 ASSAY OF IRON: CPT

## 2025-05-16 PROCEDURE — 85027 COMPLETE CBC AUTOMATED: CPT

## 2025-05-16 PROCEDURE — 36415 COLL VENOUS BLD VENIPUNCTURE: CPT

## 2025-05-16 PROCEDURE — 80053 COMPREHEN METABOLIC PANEL: CPT

## 2025-05-16 PROCEDURE — 83036 HEMOGLOBIN GLYCOSYLATED A1C: CPT

## 2025-05-16 PROCEDURE — 82306 VITAMIN D 25 HYDROXY: CPT

## 2025-05-16 PROCEDURE — 80061 LIPID PANEL: CPT

## 2025-05-16 PROCEDURE — 83735 ASSAY OF MAGNESIUM: CPT

## 2025-05-17 ENCOUNTER — OFFICE VISIT (OUTPATIENT)
Age: 40
End: 2025-05-17

## 2025-05-17 VITALS
TEMPERATURE: 98 F | SYSTOLIC BLOOD PRESSURE: 132 MMHG | DIASTOLIC BLOOD PRESSURE: 86 MMHG | RESPIRATION RATE: 19 BRPM | HEART RATE: 71 BPM | OXYGEN SATURATION: 98 % | HEIGHT: 67 IN | WEIGHT: 197 LBS | BODY MASS INDEX: 30.92 KG/M2

## 2025-05-17 DIAGNOSIS — J02.9 PHARYNGITIS, UNSPECIFIED ETIOLOGY: Primary | ICD-10-CM

## 2025-05-17 LAB — S PYO AG THROAT QL: NORMAL

## 2025-05-17 RX ORDER — PREDNISONE 20 MG/1
40 TABLET ORAL DAILY
Qty: 10 TABLET | Refills: 0 | Status: SHIPPED | OUTPATIENT
Start: 2025-05-17 | End: 2025-05-22

## 2025-05-17 ASSESSMENT — ENCOUNTER SYMPTOMS
DIARRHEA: 0
RHINORRHEA: 0
BLOOD IN STOOL: 0
SORE THROAT: 1
NAUSEA: 0
WHEEZING: 0
CONSTIPATION: 0
COUGH: 0
ABDOMINAL PAIN: 0
VOMITING: 0
SHORTNESS OF BREATH: 0

## 2025-05-17 NOTE — PROGRESS NOTES
HEMICOLECTOMY      HYSTERECTOMY (CERVIX STATUS UNKNOWN) N/A 05/18/2021    XI ROBOTIC BILATERAL SALPINGECTOMY, LEFT OOPHORECTOMY, PERITONEAL WASHINGS FOR CYTOLOGY performed by Deng Bynum MD at [facility] OR    LAPAROSCOPY  05/18/2021     DIAGNOSTIC LAPAROSCOPY, ROBOTIC ASSISTED LYSIS OF ADHESIONS     LEEP  08/29/2006    SALPINGO-OOPHORECTOMY  05/18/2021     XI ROBOTIC BILATERAL SALPINGECTOMY, LEFT OOPHORECTOMY, PERITONEAL WASHINGS FOR CYTOLOGY     TONSILLECTOMY AND ADENOIDECTOMY      TOOTH EXTRACTION  12/2015    TUBOPLASTY / TUBOTUBAL ANASTOMOSIS      TYMPANOSTOMY TUBE PLACEMENT         CURRENT MEDICATIONS    Current Outpatient Rx   Medication Sig Dispense Refill    predniSONE (DELTASONE) 20 MG tablet Take 2 tablets by mouth daily for 5 days 10 tablet 0    omeprazole (PRILOSEC) 40 MG delayed release capsule       vitamin D (ERGOCALCIFEROL) 1.25 MG (52803 UT) CAPS capsule       tranexamic acid (LYSTEDA) 650 MG TABS tablet Take 2 tablets by mouth 3 times daily for 5 days 30 tablet 0    clindamycin 1 % gel APPLY THIN LAYER TOPICALLY TO AFFECTED AREA TWICE DAILY      cyanocobalamin 1000 MCG/ML injection Inject 1 mL into the muscle      potassium chloride (KLOR-CON) 10 MEQ extended release tablet Take 1 tablet by mouth daily      vedolizumab (ENTYVIO) 300 MG injection Infuse 300 mg intravenously See Admin Instructions Q 8 weeks for chrons      vitamin D (CHOLECALCIFEROL) 81194 UNIT CAPS cholecalciferol (vitamin D3) 1,250 mcg (50,000 unit) capsule   take 1 capsule by mouth TWICE WEEKLY      Multiple Vitamins-Minerals (HM MULTIVITAMIN ADULT GUMMY) CHEW Take by mouth Takes 2-3 times a week         ALLERGIES    Allergies   Allergen Reactions    Guaifenesin Anaphylaxis and Other (See Comments)     Other reaction(s): Not available    Mucinex [Guaifenesin Er] Anaphylaxis    Claritin [Loratadine]     Infliximab Nausea And Vomiting and Other (See Comments)     Tingling all over body    Tingling all over body      Other reaction(s):  cough

## 2025-06-19 ENCOUNTER — HOSPITAL ENCOUNTER (OUTPATIENT)
Age: 40
Discharge: HOME | End: 2025-06-19
Payer: COMMERCIAL

## 2025-06-19 LAB
ALBUMIN SERPL-MCNC: 4.2 G/DL (ref 3.5–5.2)
ALBUMIN/GLOB SERPL: 1.5 {RATIO} (ref 1–2.5)
ALP SERPL-CCNC: 59 U/L (ref 35–104)
ALT SERPL-CCNC: 23 U/L (ref 10–35)
ANION GAP SERPL CALCULATED.3IONS-SCNC: 10 MMOL/L (ref 9–16)
AST SERPL-CCNC: 21 U/L (ref 10–35)
BILIRUB SERPL-MCNC: 0.3 MG/DL (ref 0–1.2)
BUN SERPL-MCNC: 10 MG/DL (ref 6–20)
CALCIUM SERPL-MCNC: 9.2 MG/DL (ref 8.6–10.4)
CHLORIDE SERPL-SCNC: 108 MMOL/L (ref 98–107)
CO2 SERPL-SCNC: 25 MMOL/L (ref 20–31)
CREAT SERPL-MCNC: 0.7 MG/DL (ref 0.6–0.9)
ERYTHROCYTE [DISTWIDTH] IN BLOOD BY AUTOMATED COUNT: 13 % (ref 11.8–14.4)
GFR, ESTIMATED: >90 ML/MIN/1.73M2
GLUCOSE SERPL-MCNC: 78 MG/DL (ref 74–99)
HCT VFR BLD AUTO: 39.5 % (ref 36.3–47.1)
HGB BLD-MCNC: 13.3 G/DL (ref 11.9–15.1)
IRON SERPL-MCNC: 66 UG/DL (ref 37–145)
MAGNESIUM SERPL-MCNC: 1.8 MG/DL (ref 1.6–2.6)
MCH RBC QN AUTO: 31.8 PG (ref 25.2–33.5)
MCHC RBC AUTO-ENTMCNC: 33.7 G/DL (ref 28.4–34.8)
MCV RBC AUTO: 94.5 FL (ref 82.6–102.9)
NRBC BLD-RTO: 0 PER 100 WBC
PLATELET # BLD AUTO: 258 K/UL (ref 138–453)
PMV BLD AUTO: 10.5 FL (ref 8.1–13.5)
POTASSIUM SERPL-SCNC: 4.1 MMOL/L (ref 3.7–5.3)
PROT SERPL-MCNC: 7 G/DL (ref 6.6–8.7)
RBC # BLD AUTO: 4.18 M/UL (ref 3.95–5.11)
SODIUM SERPL-SCNC: 143 MMOL/L (ref 136–145)
VIT B12 SERPL-MCNC: 406 PG/ML (ref 232–1245)
WBC OTHER # BLD: 7.3 K/UL (ref 3.5–11.3)

## 2025-06-19 PROCEDURE — 83735 ASSAY OF MAGNESIUM: CPT

## 2025-06-19 PROCEDURE — 36415 COLL VENOUS BLD VENIPUNCTURE: CPT

## 2025-06-19 PROCEDURE — 82607 VITAMIN B-12: CPT

## 2025-06-19 PROCEDURE — 85027 COMPLETE CBC AUTOMATED: CPT

## 2025-06-19 PROCEDURE — 83540 ASSAY OF IRON: CPT

## 2025-06-19 PROCEDURE — 80053 COMPREHEN METABOLIC PANEL: CPT

## 2025-08-26 ENCOUNTER — HOSPITAL ENCOUNTER (OUTPATIENT)
Dept: LAB | Age: 40
Discharge: HOME | End: 2025-08-26
Payer: COMMERCIAL

## 2025-08-26 LAB
25(OH)D3 SERPL-MCNC: 39.7 NG/ML (ref 30–100)
ALBUMIN SERPL-MCNC: 4 G/DL (ref 3.5–5.2)
ALBUMIN/GLOB SERPL: 1.4 {RATIO} (ref 1–2.5)
ALP SERPL-CCNC: 56 U/L (ref 35–104)
ALT SERPL-CCNC: 20 U/L (ref 10–35)
ANION GAP SERPL CALCULATED.3IONS-SCNC: 10 MMOL/L (ref 9–16)
AST SERPL-CCNC: 21 U/L (ref 10–35)
BILIRUB SERPL-MCNC: 0.4 MG/DL (ref 0–1.2)
BUN SERPL-MCNC: 9 MG/DL (ref 6–20)
CALCIUM SERPL-MCNC: 9.1 MG/DL (ref 8.6–10.4)
CHLORIDE SERPL-SCNC: 106 MMOL/L (ref 98–107)
CHOLEST SERPL-MCNC: 217 MG/DL (ref 0–199)
CHOLESTEROL/HDL RATIO: 4.4
CO2 SERPL-SCNC: 24 MMOL/L (ref 20–31)
CREAT SERPL-MCNC: 0.7 MG/DL (ref 0.6–0.9)
ERYTHROCYTE [DISTWIDTH] IN BLOOD BY AUTOMATED COUNT: 12.7 % (ref 11.8–14.4)
EST. AVERAGE GLUCOSE BLD GHB EST-MCNC: 91 MG/DL
GFR, ESTIMATED: >90 ML/MIN/1.73M2
GLUCOSE SERPL-MCNC: 121 MG/DL (ref 74–99)
HBA1C MFR BLD: 4.8 % (ref 4–6)
HCT VFR BLD AUTO: 39.7 % (ref 36.3–47.1)
HDLC SERPL-MCNC: 49 MG/DL
HGB BLD-MCNC: 13.5 G/DL (ref 11.9–15.1)
IRON SERPL-MCNC: 73 UG/DL (ref 37–145)
LDLC SERPL CALC-MCNC: 119 MG/DL (ref 0–100)
MAGNESIUM SERPL-MCNC: 1.8 MG/DL (ref 1.6–2.6)
MCH RBC QN AUTO: 31.3 PG (ref 25.2–33.5)
MCHC RBC AUTO-ENTMCNC: 34 G/DL (ref 28.4–34.8)
MCV RBC AUTO: 92.1 FL (ref 82.6–102.9)
NRBC BLD-RTO: 0 PER 100 WBC
PLATELET # BLD AUTO: 259 K/UL (ref 138–453)
PMV BLD AUTO: 10.3 FL (ref 8.1–13.5)
POTASSIUM SERPL-SCNC: 4 MMOL/L (ref 3.7–5.3)
PROT SERPL-MCNC: 6.8 G/DL (ref 6.6–8.7)
RBC # BLD AUTO: 4.31 M/UL (ref 3.95–5.11)
SODIUM SERPL-SCNC: 140 MMOL/L (ref 136–145)
TRIGL SERPL-MCNC: 247 MG/DL
VIT B12 SERPL-MCNC: 430 PG/ML (ref 232–1245)
VLDLC SERPL CALC-MCNC: 49 MG/DL (ref 1–30)
WBC OTHER # BLD: 7.9 K/UL (ref 3.5–11.3)

## 2025-08-26 PROCEDURE — 83036 HEMOGLOBIN GLYCOSYLATED A1C: CPT

## 2025-08-26 PROCEDURE — 82607 VITAMIN B-12: CPT

## 2025-08-26 PROCEDURE — 36415 COLL VENOUS BLD VENIPUNCTURE: CPT

## 2025-08-26 PROCEDURE — 80053 COMPREHEN METABOLIC PANEL: CPT

## 2025-08-26 PROCEDURE — 82306 VITAMIN D 25 HYDROXY: CPT

## 2025-08-26 PROCEDURE — 83540 ASSAY OF IRON: CPT

## 2025-08-26 PROCEDURE — 85027 COMPLETE CBC AUTOMATED: CPT

## 2025-08-26 PROCEDURE — 80061 LIPID PANEL: CPT

## 2025-08-26 PROCEDURE — 83735 ASSAY OF MAGNESIUM: CPT

## (undated) DEVICE — INTENDED FOR TISSUE SEPARATION, AND OTHER PROCEDURES THAT REQUIRE A SHARP SURGICAL BLADE TO PUNCTURE OR CUT.: Brand: BARD-PARKER ® CARBON RIB-BACK BLADES

## (undated) DEVICE — SUTURE MCRYL + SZ 4 0 L18IN ABSRB UD PC 3 L16MM 3 8 CIR PRIM MCP845G

## (undated) DEVICE — NEEDLE SPNL 18GA L3.5IN W/ QNCKE SHARPER BVL DURA CLICK

## (undated) DEVICE — SOLUTION ANTIFOG VIS SYS CLEARIFY LAPSCP

## (undated) DEVICE — GARMENT,MEDLINE,DVT,INT,CALF,MED, GEN2: Brand: MEDLINE

## (undated) DEVICE — GLOVE SURG SZ 6 THK91MIL LTX FREE SYN POLYISOPRENE ANTI

## (undated) DEVICE — ARM DRAPE

## (undated) DEVICE — DRAPE,REIN 53X77,STERILE: Brand: MEDLINE

## (undated) DEVICE — CHLORAPREP 26ML ORANGE

## (undated) DEVICE — 3 ML SYRINGE LUER-LOCK TIP: Brand: MONOJECT

## (undated) DEVICE — SYRINGE, LUER LOCK, 10ML: Brand: MEDLINE

## (undated) DEVICE — STRIP SKIN CLSR W0.25XL4IN WHT SPUNBOUND FBR NYL HI ADH

## (undated) DEVICE — BLADELESS OBTURATOR, LONG: Brand: WECK VISTA

## (undated) DEVICE — INSUFFLATION NEEDLE TO ESTABLISH PNEUMOPERITONEUM.: Brand: INSUFFLATION NEEDLE

## (undated) DEVICE — SCISSOR SURG METZ CRV TIP

## (undated) DEVICE — GLOVE SURG SZ 8 L12IN FNGR THK79MIL GRN LTX FREE

## (undated) DEVICE — Device

## (undated) DEVICE — TOTAL TRAY, 16FR 10ML SIL FOLEY, URN: Brand: MEDLINE

## (undated) DEVICE — TROCAR: Brand: KII FIOS FIRST ENTRY

## (undated) DEVICE — ADHESIVE SKIN CLSR 0.7ML TOP DERMBND ADV

## (undated) DEVICE — GLOVE SURG SZ 75 L12IN FNGR THK87MIL WHT LTX FREE

## (undated) DEVICE — TRI-LUMEN FILTERED TUBE SET WITH ACTIVATED CHARCOAL FILTER: Brand: AIRSEAL

## (undated) DEVICE — LEGGINGS, PAIR, 31X48, STERILE: Brand: MEDLINE

## (undated) DEVICE — GOWN,SIRUS,NONRNF,SETINSLV,XL,20/CS: Brand: MEDLINE

## (undated) DEVICE — BAG SPEC REM 224ML W4XL6IN DIA10MM 1 HND GYN DISP ENDOPCH

## (undated) DEVICE — DEVICE TRCR 12X9X3IN WHT CLSR DISP OMNICLOSE

## (undated) DEVICE — 3M™ STERI-STRIP™ REINFORCED ADHESIVE SKIN CLOSURES, R1546, 1/4 IN X 4 IN (6 MM X 100 MM), 10 STRIPS/ENVELOPE: Brand: 3M™ STERI-STRIP™

## (undated) DEVICE — GLOVE ORANGE PI 8   MSG9080

## (undated) DEVICE — SYRINGE MED 5ML STD CLR PLAS LUERLOCK TIP N CTRL DISP

## (undated) DEVICE — STERILE POLYISOPRENE POWDER-FREE SURGICAL GLOVES WITH EMOLLIENT COATING: Brand: PROTEXIS

## (undated) DEVICE — GOWN,AURORA,NONREINFORCED,LARGE: Brand: MEDLINE

## (undated) DEVICE — PROTECTOR ULN NRV PUR FOAM HK LOOP STRP ANATOMICALLY

## (undated) DEVICE — NEEDLE INSUF L150MM DIA2MM DISP FOR PNEUMOPERI ENDOPATH

## (undated) DEVICE — BLADELESS OBTURATOR: Brand: WECK VISTA

## (undated) DEVICE — SUTURE MCRYL SZ 4-0 L18IN ABSRB UD L16MM PC-3 3/8 CIR PRIM Y845G

## (undated) DEVICE — SOLUTION SCRB 4OZ 4% CHG H2O AIDED FOR PREOPERATIVE SKIN

## (undated) DEVICE — SYRINGE,EAR/ULCER, 2 OZ, STERILE: Brand: MEDLINE

## (undated) DEVICE — COUNTER NDL 40 COUNT HLD 70 FOAM BLK ADH W/ MAG

## (undated) DEVICE — TRAP,MUCUS SPECIMEN, 80CC: Brand: MEDLINE

## (undated) DEVICE — CANNULA IV 18GA L15IN BLNT FILL LUERLOCK HUB MJCT

## (undated) DEVICE — ELECTRO LUBE IS A SINGLE PATIENT USE DEVICE THAT IS INTENDED TO BE USED ON ELECTROSURGICAL ELECTRODES TO REDUCE STICKING.: Brand: KEY SURGICAL ELECTRO LUBE

## (undated) DEVICE — SUTURE VCRL + SZ 1 L36IN ABSRB UD L36MM CT-1 1/2 CIR VCP947H

## (undated) DEVICE — COVER OR TBL W40XL90IN ABSRB STD AND GRIPPY BK SAHARA

## (undated) DEVICE — COUNTER NDL 10 COUNT HLD 20 FOAM BLK SGL MAG

## (undated) DEVICE — GLOVE ORANGE PI 7 1/2   MSG9075

## (undated) DEVICE — MARKER,SKIN,WI/RULER AND LABELS: Brand: MEDLINE

## (undated) DEVICE — AIRSEAL 12 MM ACCESS PORT AND PALM GRIP OBTURATOR WITH BLADELESS OPTICAL TIP, 120 MM LENGTH: Brand: AIRSEAL

## (undated) DEVICE — SOLUTION PREP POVIDONE IOD FOR SKIN MUCOUS MEM PRIOR TO

## (undated) DEVICE — INSUFFLATION TUBING SET WITH FILTER, FUNNEL CONNECTOR AND LUER LOCK: Brand: JOSNOE MEDICAL INC

## (undated) DEVICE — GLOVE EXAM M L95IN FNGR THK35MIL PALM THK24MIL OFF WHT

## (undated) DEVICE — PLUMEPORT SEO LAPAROSCOPIC SMOKE FILTRATION DEVICE: Brand: PLUMEPORT

## (undated) DEVICE — CANNULA SEAL

## (undated) DEVICE — 3M™ IOBAN™ 2 ANTIMICROBIAL INCISE DRAPE 6650EZ: Brand: IOBAN™ 2

## (undated) DEVICE — Z DISCONTINUED USE 2423037 APPLICATOR MEDICATED 3 CC CHLORHEXIDINE GLUC 2% CHLORAPREP

## (undated) DEVICE — STANDARD HYPODERMIC NEEDLE,POLYPROPYLENE HUB: Brand: MONOJECT

## (undated) DEVICE — AGENT HEMOSTATIC SURGIFLOW MATRIX KIT W/THROMBIN

## (undated) DEVICE — GLOVE SURG SZ 65 THK91MIL LTX FREE SYN POLYISOPRENE

## (undated) DEVICE — SUTURE MCRYL SZ 0 L36IN ABSRB VLT CT-1 L36MM 1/2 CIR SGL Y346H

## (undated) DEVICE — TOWEL,OR,DSP,ST,NATURAL,DLX,4/PK,20PK/CS: Brand: MEDLINE

## (undated) DEVICE — GEL US 20GM NONIRRITATING OVERWRAPPED FILE PCH TRNSMIT

## (undated) DEVICE — TIP COVER ACCESSORY

## (undated) DEVICE — GLOVE ORANGE PI 7   MSG9070

## (undated) DEVICE — DRAPE,UNDRBUT,WHT GRAD PCH,CAPPORT,20/CS: Brand: MEDLINE

## (undated) DEVICE — TISSUE RETRIEVAL SYSTEM: Brand: INZII RETRIEVAL SYSTEM

## (undated) DEVICE — SVMMC GYN ROBOTIC PK

## (undated) DEVICE — APPLICATOR MEDICATED 26 CC SOLUTION HI LT ORNG CHLORAPREP

## (undated) DEVICE — 40580 - THE PINK PAD - ADVANCED TRENDELENBURG POSITIONING KIT: Brand: 40580 - THE PINK PAD - ADVANCED TRENDELENBURG POSITIONING KIT

## (undated) DEVICE — 35 ML SYRINGE LUER-LOCK TIP: Brand: MONOJECT

## (undated) DEVICE — SUTURE VCRL + SZ 0 L27IN ABSRB VLT L26MM UR-6 5/8 CIR VCP603H

## (undated) DEVICE — TUBING, SUCTION, 9/32" X 20', STRAIGHT: Brand: MEDLINE INDUSTRIES, INC.

## (undated) DEVICE — Z DISCONTINUED BY MEDLINE USE 2711682 TRAY SKIN PREP DRY W/ PREM GLV

## (undated) DEVICE — CONTAINER,SPECIMEN,4OZ,OR STRL: Brand: MEDLINE

## (undated) DEVICE — KIT CHOLGM POLYUR W/ KARLAN BLLN CATH 4FR L60CM 5MM INTRO

## (undated) DEVICE — COVER LT HNDL BLU PLAS

## (undated) DEVICE — COVER,LIGHT HANDLE,FLX,2/PK: Brand: MEDLINE INDUSTRIES, INC.